# Patient Record
Sex: FEMALE | Race: WHITE | NOT HISPANIC OR LATINO | Employment: OTHER | URBAN - METROPOLITAN AREA
[De-identification: names, ages, dates, MRNs, and addresses within clinical notes are randomized per-mention and may not be internally consistent; named-entity substitution may affect disease eponyms.]

---

## 2017-03-24 RX ORDER — GABAPENTIN 300 MG/1
CAPSULE ORAL
Qty: 90 CAP | Refills: 0 | Status: SHIPPED | OUTPATIENT
Start: 2017-03-24 | End: 2017-07-07 | Stop reason: SDUPTHER

## 2017-03-24 NOTE — TELEPHONE ENCOUNTER
Last seen: 06/13/16 by Dr. Loya  Next appt: None     Was the patient seen in the last year in this department? Yes   Does patient have an active prescription for medications requested? No   Received Request Via: Pharmacy

## 2017-07-07 RX ORDER — AMLODIPINE BESYLATE 5 MG/1
TABLET ORAL
Qty: 30 TAB | Refills: 0 | Status: SHIPPED | OUTPATIENT
Start: 2017-07-07 | End: 2017-08-18 | Stop reason: SDUPTHER

## 2017-08-18 RX ORDER — GABAPENTIN 300 MG/1
CAPSULE ORAL
Qty: 30 CAP | Refills: 0 | Status: SHIPPED | OUTPATIENT
Start: 2017-08-18 | End: 2017-08-21

## 2017-08-18 RX ORDER — AMLODIPINE BESYLATE 5 MG/1
TABLET ORAL
Qty: 30 TAB | Refills: 0 | Status: SHIPPED | OUTPATIENT
Start: 2017-08-18 | End: 2017-08-21

## 2017-08-18 NOTE — TELEPHONE ENCOUNTER
Last seen: 06/13/16 by Dr. Loya  Next appt: 08/21/17 with Dr. Carmona    Was the patient seen in the last year in this department? Yes   Does patient have an active prescription for medications requested? No   Received Request Via: Pharmacy

## 2017-08-21 ENCOUNTER — OFFICE VISIT (OUTPATIENT)
Dept: INTERNAL MEDICINE | Facility: MEDICAL CENTER | Age: 82
End: 2017-08-21
Payer: MEDICARE

## 2017-08-21 VITALS
TEMPERATURE: 98 F | WEIGHT: 129.6 LBS | SYSTOLIC BLOOD PRESSURE: 152 MMHG | RESPIRATION RATE: 18 BRPM | DIASTOLIC BLOOD PRESSURE: 86 MMHG | HEART RATE: 84 BPM | HEIGHT: 59 IN | BODY MASS INDEX: 26.13 KG/M2 | OXYGEN SATURATION: 94 %

## 2017-08-21 DIAGNOSIS — I10 ESSENTIAL HYPERTENSION: ICD-10-CM

## 2017-08-21 DIAGNOSIS — M48.02 SPINAL STENOSIS OF CERVICAL REGION: ICD-10-CM

## 2017-08-21 DIAGNOSIS — Z00.00 EXAMINATION, MEDICAL, GENERAL: ICD-10-CM

## 2017-08-21 DIAGNOSIS — E11.9 TYPE 2 DIABETES MELLITUS WITHOUT COMPLICATION, WITHOUT LONG-TERM CURRENT USE OF INSULIN (HCC): ICD-10-CM

## 2017-08-21 PROCEDURE — 99213 OFFICE O/P EST LOW 20 MIN: CPT | Mod: GE | Performed by: INTERNAL MEDICINE

## 2017-08-21 RX ORDER — GABAPENTIN 100 MG/1
100 CAPSULE ORAL DAILY
Qty: 30 CAP | Refills: 2 | Status: SHIPPED | OUTPATIENT
Start: 2017-08-21 | End: 2017-11-17 | Stop reason: SDUPTHER

## 2017-08-21 RX ORDER — AMLODIPINE BESYLATE 5 MG/1
5 TABLET ORAL DAILY
Qty: 30 TAB | Refills: 2 | Status: SHIPPED | OUTPATIENT
Start: 2017-08-21 | End: 2017-11-17 | Stop reason: SDUPTHER

## 2017-08-21 ASSESSMENT — ENCOUNTER SYMPTOMS
FOCAL WEAKNESS: 0
FEVER: 0
VOMITING: 0
DIARRHEA: 0
NERVOUS/ANXIOUS: 0
NAUSEA: 0
SHORTNESS OF BREATH: 0
WEAKNESS: 0
BLURRED VISION: 0
DEPRESSION: 0
STRIDOR: 0
SENSORY CHANGE: 0
MYALGIAS: 0
EYE PAIN: 0
HEADACHES: 0
ABDOMINAL PAIN: 0
FALLS: 0
CHILLS: 0
TREMORS: 0
CONSTIPATION: 0
BRUISES/BLEEDS EASILY: 0

## 2017-08-21 ASSESSMENT — PAIN SCALES - GENERAL: PAINLEVEL: 4=SLIGHT-MODERATE PAIN

## 2017-08-21 ASSESSMENT — PATIENT HEALTH QUESTIONNAIRE - PHQ9: CLINICAL INTERPRETATION OF PHQ2 SCORE: 0

## 2017-08-21 NOTE — MR AVS SNAPSHOT
"Viki García   2017 10:30 AM   Office Visit   MRN: [id number]    Department:  [facility] - Internal Med   Dept Phone:  [phone]    Description:  Female : 1935   Provider:  Sixto Sheikh M.D.           Reason for Visit     Hypertension refills medication    Labs Only review labs from 2016      Allergies as of 2017     No Known Allergies      You were diagnosed with     Essential hypertension   [7142350]       Spinal stenosis of cervical region   [071245]         Vital Signs     Blood Pressure Pulse Temperature Respirations Height Weight    152/86 mmHg 84 36.7 °C (98 °F) 18 1.486 m (4' 10.5\") 58.786 kg (129 lb 9.6 oz)    Body Mass Index Oxygen Saturation Breastfeeding? Smoking Status          26.62 kg/m2 94% No Never Smoker         Basic Information     Date Of Birth Sex Race Ethnicity Preferred Language    1935 Female White Non- English      Your appointments     Oct 16, 2017  3:00 PM   New Patient with Emerita Obregon M.D.   [facility] - Internal Medicine (--)    [address] [place] [id number] [phone]           Please bring Photo ID, Insurance Cards, All Medication Bottles and copies of any legal documents (such as Living Will, Power of ) If speaking a language besides English please bring an adult . Please arrive 30 minutes prior for check in and registration. You will be receiving a confirmation call a few days before your appointment from our automated call confirmation system.              Problem List              ICD-10-CM Priority Class Noted - Resolved    Spinal stenosis M48.00   2016 - Present    Alcohol abuse F10.10   2016 - Present    Type 2 diabetes mellitus without complication, without long-term current use of insulin (CMS-HCC) E11.9   2016 - Present    Hyperlipidemia, unspecified E78.5   2016 - Present    Osteoarthritis M19.90   2016 - Present    Right shoulder pain " M25.511   6/9/2016 - Present    Essential hypertension I10   6/9/2016 - Present      Health Maintenance        Date Due Completion Dates    DIABETES MONOFILAMENT / LE EXAM 3/24/1936 ---    RETINAL SCREENING 9/24/1953 ---    IMM DTaP/Tdap/Td Vaccine (1 - Tdap) 9/24/1954 ---    PAP SMEAR 9/24/1956 ---    MAMMOGRAM 9/24/1975 ---    COLONOSCOPY 9/24/1985 ---    IMM ZOSTER VACCINE 9/24/1995 ---    BONE DENSITY 9/24/2000 ---    IMM PNEUMOCOCCAL 65+ (ADULT) LOW/MEDIUM RISK SERIES (1 of 2 - PCV13) 9/24/2000 ---    FASTING LIPID PROFILE 2/8/2009 2/8/2008    URINE ACR / MICROALBUMIN 2/8/2009 2/8/2008    A1C SCREENING 12/20/2016 6/20/2016, 2/8/2008    SERUM CREATININE 6/20/2017 6/20/2016, 2/25/2008, 2/24/2008, 2/23/2008, 2/22/2008, 2/22/2008, 2/21/2008, 2/20/2008, 2/19/2008, 2/8/2008, 9/19/2007    IMM INFLUENZA (1) 9/1/2017 ---            Current Immunizations     No immunizations on file.      Below and/or attached are the medications your provider expects you to take. Review all of your home medications and newly ordered medications with your provider and/or pharmacist. Follow medication instructions as directed by your provider and/or pharmacist. Please keep your medication list with you and share with your provider. Update the information when medications are discontinued, doses are changed, or new medications (including over-the-counter products) are added; and carry medication information at all times in the event of emergency situations     Allergies:  No Known Allergies          Medications  Valid as of: August 21, 2017 - 11:31 AM    Generic Name Brand Name Tablet Size Instructions for use    AmLODIPine Besylate (Tab) NORVASC 5 MG Take 1 Tab by mouth every day.        Gabapentin (Cap) NEURONTIN 100 MG Take 1 Cap by mouth every day.        .                 Medicines prescribed today were sent to:     [facility] #[id number] - [place], [place] - [address] [place] [address] AT [address] [address]    Phone:  [phone] Fax: [phone]    Open 24 Hours?: No      Medication refill instructions:       If your prescription bottle indicates you have medication refills left, it is not necessary to call your provider’s office. Please contact your pharmacy and they will refill your medication.    If your prescription bottle indicates you do not have any refills left, you may request refills at any time through one of the following ways: The online Umeng system (except Urgent Care), by calling your provider’s office, or by asking your pharmacy to contact your provider’s office with a refill request. Medication refills are processed only during regular business hours and may not be available until the next business day. Your provider may request additional information or to have a follow-up visit with you prior to refilling your medication.   *Please Note: Medication refills are assigned a new Rx number when refilled electronically. Your pharmacy may indicate that no refills were authorized even though a new prescription for the same medication is available at the pharmacy. Please request the medicine by name with the pharmacy before contacting your provider for a refill.           Umeng Access Code: [email or web address]  Expires: 9/20/2017 10:34 AM    Umeng  A secure, online tool to manage your health information     [employer]’s Umeng® is a secure, online tool that connects you to your personalized health information from the privacy of your home -- day or night - making it very easy for you to manage your healthcare. Once the activation process is completed, you can even access your medical information using the Umeng rashmi, which is available for free in the Apple Rashmi store or Google Play store.     Umeng provides the following levels of access (as shown below):   My Chart Features   [facility] Doctor [facility]  Specialists [facility]  Urgent  Care Non-[facility]  Primary Care  Doctor   Email your healthcare team  securely and privately 24/7 X X X    Manage appointments: schedule your next appointment; view details of past/upcoming appointments X      Request prescription refills. X      View recent personal medical records, including lab and immunizations X X X X   View health record, including health history, allergies, medications X X X X   Read reports about your outpatient visits, procedures, consult and ER notes X X X X   See your discharge summary, which is a recap of your hospital and/or ER visit that includes your diagnosis, lab results, and care plan. X X       How to register for Recommind:  1. Go to  [email or web address].  2. Click on the Sign Up Now box, which takes you to the New Member Sign Up page. You will need to provide the following information:  a. Enter your Recommind Access Code exactly as it appears at the top of this page. (You will not need to use this code after you’ve completed the sign-up process. If you do not sign up before the expiration date, you must request a new code.)   b. Enter your date of birth.   c. Enter your home email address.   d. Click Submit, and follow the next screen’s instructions.  3. Create a Recommind ID. This will be your Recommind login ID and cannot be changed, so think of one that is secure and easy to remember.  4. Create a Recommind password. You can change your password at any time.  5. Enter your Password Reset Question and Answer. This can be used at a later time if you forget your password.   6. Enter your e-mail address. This allows you to receive e-mail notifications when new information is available in Recommind.  7. Click Sign Up. You can now view your health information.    For assistance activating your Recommind account, call [phone]

## 2017-08-22 NOTE — PROGRESS NOTES
Established Patient    CC: medication refill    HPI: Viki García is a 81 y.o. Female, who presents today to refill her medications.  She Has a PMH including DM-2 (not on medications), DLD, improved spinal stenosis (s/p surgery), and HTN.  She states that she is her to refill her amlodipine, and gabapentin.  Those are the only 2 medications she is on (and brought them with her).  She does not know what the gabapentin is for (300 mg, daily), and denies any numbness or tingling, or pain; however, she does have a past surgery for spinal stenosis.  She claims that her home BP measurements are good (but no exact numbers), and that she gets white coat syndrome.  She is accompanied by a friend, who supports those statements (as being accurate).     A review of her living situation notes that she drinks a couple beers a day, and that she has no intention of changing that (at age 81).  A screening review of fall risk notes that she denies recent falls, and has no rugs at home.  There is good lighting and handrails.  Her friend confirms that she is able to get up and around the house without difficulty.  Her friend also notes that the patient dislikes having to go to the doctor's office, and only came, because the pharmacy would not refill her medications.        ROS: As per HPI. Additional pertinent symptoms as noted below.  Review of Systems   Constitutional: Negative for fever and chills.   HENT: Negative for ear discharge.    Eyes: Negative for blurred vision and pain.   Respiratory: Negative for shortness of breath and stridor.    Cardiovascular: Negative for chest pain and leg swelling.   Gastrointestinal: Negative for nausea, vomiting, abdominal pain, diarrhea and constipation.   Genitourinary: Negative for dysuria.   Musculoskeletal: Negative for myalgias and falls.        Notes mild stiffness in neck, where she has a plate placed during surgery for spinal stenosis.    Skin: Negative for rash.   Neurological:  "Negative for tremors, sensory change, focal weakness, weakness and headaches.   Endo/Heme/Allergies: Does not bruise/bleed easily.   Psychiatric/Behavioral: Negative for depression. The patient is not nervous/anxious.         Drinks 2 beers a day, and she considers this to be fine (and not planning to change).      Past Medical History   Diagnosis Date   • Headache 6/9/2016   • Spinal stenosis 6/9/2016   • Alcohol abuse 6/9/2016   • Hyperlipidemia 6/9/2016   • Osteoarthritis 6/9/2016   • Right shoulder pain 6/9/2016   • HTN (hypertension) 6/9/2016   • Diabetes mellitus, type II (CMS-HCC) 6/9/2016     not on medication     Past Surgical History   Procedure Laterality Date   • Other orthopedic surgery       NECK   • Hysterectomy, total abdominal         History reviewed. No pertinent family history.      Social History     Social History   • Marital Status:      Spouse Name: N/A   • Number of Children: N/A   • Years of Education: N/A     Occupational History   • Not on file.     Social History Main Topics   • Smoking status: Never Smoker    • Smokeless tobacco: Never Used   • Alcohol Use: 8.4 oz/week     0 Standard drinks or equivalent, 14 Cans of beer per week   • Drug Use: No   • Sexual Activity: Not on file     Other Topics Concern   • Not on file     Social History Narrative         Medication Sig   • gabapentin (NEURONTIN) 300 MG Cap Take 1 Cap by mouth every day.   • amlodipine (NORVASC) 5 MG Tab Take 1 Tab by mouth every day.     No Known Allergies      Physical Exam  /86 mmHg  Pulse 84  Temp(Src) 36.7 °C (98 °F)  Resp 18  Ht 1.486 m (4' 10.5\")  Wt 58.786 kg (129 lb 9.6 oz)  BMI 26.62 kg/m2  SpO2 94%  Breastfeeding? No    Physical Exam   Constitutional: She is oriented to person, place, and time. She appears well-developed and well-nourished.   HENT:   Head: Normocephalic and atraumatic.   Eyes: Conjunctivae and EOM are normal.   Neck: No tracheal deviation present.   Cardiovascular: Normal " "rate, regular rhythm and normal heart sounds.    Pulmonary/Chest: Effort normal and breath sounds normal. No stridor.   Abdominal: Soft. Bowel sounds are normal. She exhibits no distension. There is no tenderness.   Musculoskeletal: She exhibits no edema or tenderness.   Scar noted on back of neck, from prior surgery.     Neurological: She is alert and oriented to person, place, and time.   Skin: Skin is warm and dry. No rash noted.   Psychiatric: She has a normal mood and affect.       Assessment and Plan    Essential hypertension  Slightly elevated BP at 152/86, in office.    However, her friend verifies she often has \"white coat syndrome,\"  and her measurements at home are (reportedly) normal.   Given her age, and possible white-coat syndrome, will not change medications.   Refilled her amlodipine (5 mg, daily).     Spinal stenosis of cervical region / prior Rx for gabapentin.  Patient does not have any pain, numbness, or tingling.   She denies any current neuropathies.    She does not recall why she was taking gabapentin.   Decreasing gabapentin dose to 100 mg daily (from 300).     DM-2, without complications or long-term current use of insulin    & Examination, medical, general  Last A1C was 6.1, so was not on medication.  Patient should get new annual labs, as last ones are just over a year old.   Will request new cbc, cmp, and a1c.   Given the patient's age, she is to be scheduled with the geriatrician for her next visit (in 1 month).  The patient states she is agreeable to this, but her friend notes she is reluctant to go to a doctor.      Signed by: Sixto Sheikh M.D.      "

## 2017-11-17 DIAGNOSIS — I10 ESSENTIAL HYPERTENSION: ICD-10-CM

## 2017-11-17 RX ORDER — AMLODIPINE BESYLATE 5 MG/1
TABLET ORAL
Qty: 10 TAB | Refills: 0 | Status: SHIPPED | OUTPATIENT
Start: 2017-11-17 | End: 2018-02-26 | Stop reason: SDUPTHER

## 2017-11-17 NOTE — TELEPHONE ENCOUNTER
.Last seen: 08/21/17 by Dr. Loya  Next appt: none    Was the patient seen in the last year in this department? Yes   Does patient have an active prescription for medications requested? No   Received Request Via: Pharmacy

## 2017-12-18 ENCOUNTER — TELEPHONE (OUTPATIENT)
Dept: INTERNAL MEDICINE | Facility: MEDICAL CENTER | Age: 82
End: 2017-12-18

## 2017-12-18 DIAGNOSIS — I10 ESSENTIAL HYPERTENSION: ICD-10-CM

## 2017-12-18 DIAGNOSIS — E11.9 TYPE 2 DIABETES MELLITUS WITHOUT COMPLICATION, WITHOUT LONG-TERM CURRENT USE OF INSULIN (HCC): ICD-10-CM

## 2017-12-18 DIAGNOSIS — M48.02 SPINAL STENOSIS OF CERVICAL REGION: ICD-10-CM

## 2017-12-18 DIAGNOSIS — M19.041 PRIMARY OSTEOARTHRITIS OF BOTH HANDS: ICD-10-CM

## 2017-12-18 DIAGNOSIS — M19.042 PRIMARY OSTEOARTHRITIS OF BOTH HANDS: ICD-10-CM

## 2017-12-18 NOTE — TELEPHONE ENCOUNTER
Spoke with patient to answered all questions appropriately. She states her dilemma is that she has no transportation and can't afford bus services etc. So she is essentially homebound and cannot get herself to care.  We will try putting a home health referral in and see if they can get her some  support.

## 2017-12-18 NOTE — TELEPHONE ENCOUNTER
SOPHIA~ Pharmacy says pt has had labs drawn and doesn't understand why her Amlodipine was denied. We have no results so I contacted pt and she is very confused. Says she doesn't know where labs were drawn, will call the person that drove her and find out. She claims all her friends have , she has no one and she'll die soon too so it doesn't matter if she gets her meds. She doesn't drive and refuses to use [employer] bus or taxi to come to appts so says she won't schedule. I explained that we have to see her, especially now since the last 2 times I've talked to her I'm worried about her living on her own, she seems confused, agitated and down on the phone.  I repeated myself many times during our conversation today,and again encouraged her to see a geriatrician.

## 2017-12-19 ENCOUNTER — TELEPHONE (OUTPATIENT)
Dept: INTERNAL MEDICINE | Facility: MEDICAL CENTER | Age: 82
End: 2017-12-19

## 2017-12-19 NOTE — TELEPHONE ENCOUNTER
Home health since they cannot see this patient until she has a face-to-face in the office. Please ask her to come in for a visit so we can get home health going

## 2017-12-19 NOTE — TELEPHONE ENCOUNTER
Spoke to pt, she doesn't remember our phone conversation yesterday very well, says she can't contact anybody to see where she had labs drawn. I've asked Luz Elena at [facility] to draw fasting labs, she says they can do that next week.

## 2018-01-19 ENCOUNTER — TELEPHONE (OUTPATIENT)
Dept: INTERNAL MEDICINE | Facility: MEDICAL CENTER | Age: 83
End: 2018-01-19

## 2018-01-19 NOTE — TELEPHONE ENCOUNTER
Pt called and wanted to know why her medication was not filled. Notified pt that she has to get her labs done in order to get her medication refilled. Notified her that she hasn't had her labs done since 2016. Pt was not understanding what I meant and kept repeating herself as to why her medication is not refilled. I am concerned that pt should not being living alone as she is not able to care for herself. Let her know that home health has been trying to reach her per their notes and pt did not answer. Pt states that she will just die since she cannot get her medication. Notified Dr. Hoskins medical assistant of the conversation and she is going to contact pt and see what we are going to do moving forward in order to get pt the best care we can.

## 2018-01-23 NOTE — TELEPHONE ENCOUNTER
Please call patient and reinforce that it is important she get laboratory testing done. And then she should follow up here to be seen.    Also let her know that  untreated hypertension increases the risk of heart attack, stroke, kidney disease, as well as other health issues.    Patient also has prediabetes which needs follow-up.     Taking blood pressure medication without regular visits to monitor blood pressure, and without labs to see if we are causing more harm than good is dangerous as well.    If she continues to be unwilling to do lab work and to be seen, she should at a minimum work on a low salt diet, regular exercise, and avoiding alcohol to decrease the risk of hypertension related illness.

## 2018-01-24 NOTE — TELEPHONE ENCOUNTER
I copied this to a letter and mailed to pt with lab order. She has a hard time understanding what we are telling her on the phone. I will f/u with her next week.

## 2018-01-31 NOTE — TELEPHONE ENCOUNTER
"I offered Uber rides and Geriatricians that see pt's at their homes. Pt to think about, says she just wants to give up at this point. We've discussed suicide, she says she has no intentions, just very frustrated that she has nobody and no transportation. Pt tearful most times I talk with her. She'd like to think about my offers for a couple days. [facility] is the only one that I can find that does home visits, but per Miriam, they are \"on hold\" x 1 month. Phone [phone].  "

## 2018-02-14 ENCOUNTER — TELEPHONE (OUTPATIENT)
Dept: INTERNAL MEDICINE | Facility: MEDICAL CENTER | Age: 83
End: 2018-02-14

## 2018-02-14 NOTE — TELEPHONE ENCOUNTER
Pt has been c/o not getting her meds and wanting to just die x months now. I spent the last week convincing her to let an Uber  ([facility] [employer]) bring her here, to the lab and back home so that we can see her. Home health &  won't see her since it's been so long since she's had a face-to-face with us. She has no friends, no family, no $, refuses taxi or bus. I had her scheduled here this am and called her many times to reiterate that the Uber  would be at her house @ 10:15 this am, he came and she refused ride. She called here tearful, wondering what she's going to do now. Says again she just thinks she should kill herself.  I have her on a waiting list for Geriatric in home visits, but they can't see her x months. She is to call me in the next 2 days if she'll agree again to have Uber pick her up.

## 2018-02-14 NOTE — TELEPHONE ENCOUNTER
Left [facility] with [facility] [phone]- gubxij call back to [phone].

## 2018-02-14 NOTE — TELEPHONE ENCOUNTER
"Spoke with intake person at [facility]- told that I have concerns: pt expressed to MA \"would like to just die\"- (although no mention of being actively suicidal), that pt unable to get to office for visit- we sent UBER- and she sent it away today, not taking meds or getting labs, unable to get SW or HH to home since no recent office visit, pt with hx EtOH use disorder.   A report will be sent to supervisor.  "

## 2018-02-16 NOTE — TELEPHONE ENCOUNTER
Sanna with [facility] returned my call @ 2:44pm today, says she can't really give us too much information. She is at pt's home now and we have arranged for pt to once again be p/u by an Uber  on 02/26/18 to see  at 10:30am. Sanna is to be at pt's home to facilitate her ride. She reiterated to pt that she was unable to get her meds refilled until seen and I listened as she gave all the information to Viki re: appt, Uber, her coming back out.     Then pt called at 3:35pm and asked for her meds to be refilled, acted like she knew nothing about her appt, Uber and Sanna coming back out, didn't remember anything from their conversation 1 hour prior! I left Sanna a message in regards to this as this happens with pt often.

## 2018-02-26 ENCOUNTER — HOME HEALTH ADMISSION (OUTPATIENT)
Dept: HOME HEALTH SERVICES | Facility: HOME HEALTHCARE | Age: 83
End: 2018-02-26
Payer: MEDICARE

## 2018-02-26 ENCOUNTER — OFFICE VISIT (OUTPATIENT)
Dept: INTERNAL MEDICINE | Facility: MEDICAL CENTER | Age: 83
End: 2018-02-26
Payer: MEDICARE

## 2018-02-26 VITALS
OXYGEN SATURATION: 93 % | SYSTOLIC BLOOD PRESSURE: 191 MMHG | DIASTOLIC BLOOD PRESSURE: 95 MMHG | BODY MASS INDEX: 26.21 KG/M2 | HEIGHT: 59 IN | TEMPERATURE: 98.5 F | HEART RATE: 95 BPM | WEIGHT: 130 LBS

## 2018-02-26 DIAGNOSIS — I10 ESSENTIAL HYPERTENSION: ICD-10-CM

## 2018-02-26 DIAGNOSIS — Z78.9 ALCOHOL USE: ICD-10-CM

## 2018-02-26 DIAGNOSIS — R73.03 PREDIABETES: ICD-10-CM

## 2018-02-26 DIAGNOSIS — Z00.00 HEALTH CARE MAINTENANCE: ICD-10-CM

## 2018-02-26 DIAGNOSIS — M48.02 SPINAL STENOSIS OF CERVICAL REGION: ICD-10-CM

## 2018-02-26 LAB
HBA1C MFR BLD: 5.6 % (ref ?–5.8)
INT CON NEG: NEGATIVE
INT CON POS: POSITIVE

## 2018-02-26 PROCEDURE — 99214 OFFICE O/P EST MOD 30 MIN: CPT | Mod: GC | Performed by: INTERNAL MEDICINE

## 2018-02-26 PROCEDURE — 83036 HEMOGLOBIN GLYCOSYLATED A1C: CPT | Performed by: INTERNAL MEDICINE

## 2018-02-26 PROCEDURE — 92250 FUNDUS PHOTOGRAPHY W/I&R: CPT | Mod: TC,GC | Performed by: INTERNAL MEDICINE

## 2018-02-26 RX ORDER — GABAPENTIN 100 MG/1
100 CAPSULE ORAL DAILY
Qty: 30 CAP | Refills: 0 | Status: SHIPPED | OUTPATIENT
Start: 2018-02-26 | End: 2018-03-28 | Stop reason: SDUPTHER

## 2018-02-26 RX ORDER — AMLODIPINE BESYLATE 5 MG/1
TABLET ORAL
Qty: 30 TAB | Refills: 0 | Status: SHIPPED | OUTPATIENT
Start: 2018-02-26 | End: 2018-02-26

## 2018-02-26 RX ORDER — AMLODIPINE BESYLATE 10 MG/1
10 TABLET ORAL DAILY
Qty: 30 TAB | Refills: 0 | Status: SHIPPED | OUTPATIENT
Start: 2018-02-26 | End: 2018-04-06

## 2018-02-26 ASSESSMENT — PATIENT HEALTH QUESTIONNAIRE - PHQ9
5. POOR APPETITE OR OVEREATING: 0 - NOT AT ALL
SUM OF ALL RESPONSES TO PHQ QUESTIONS 1-9: 3
CLINICAL INTERPRETATION OF PHQ2 SCORE: 3

## 2018-02-26 ASSESSMENT — ACTIVITIES OF DAILY LIVING (ADL): BATHING_REQUIRES_ASSISTANCE: 0

## 2018-02-26 NOTE — PROGRESS NOTES
Established Patient    Viki presents today with the following:    CC: Medication refill and follow-up of chronic conditions    HPI: 82-year-old female patient with a past medical history of hypertension, osteoarthritis, alcohol abuse, spinal stenosis comes in for medication refill and follow-up of her chronic conditions.    #1 hypertension: Patient states chronic history and currently on amlodipine 5 mg PO once daily. Denies any related symptoms headache, blurry vision, chest pain, palpitations, problems with urination. Patient states she is compliant with medications.Measures BP at home infrequently , does not remember the readings.    #2 Spinal stenosis: Chronic history .Undergone surgeries in the past. Currently stable with gabapentin 100 mg by mouth once daily. The dose was reduced last year from 300 mg to 100 mg. Denies any related symptoms.    #3. Diabetes: Long history in last HbA1c was 6.1 in June 2016. Currently denies any related symptoms of polyuria, polydipsia, tingling, numbness, problems with urination, chest pain, palpitations. Does not recall if she had any eye examination done in the past. Currently not on any medications.    #4 alcohol use: Patient states she has been drinking since the age of 8. 2-3 drinks of beer daily which hasn't changed in the past many years. Not motivated to cut down on alcohol intake yet.    Independent regarding ADLs and IADLs  Patient  lives alone and lives in a [address] house.     Patient Active Problem List    Diagnosis Date Noted   • Spinal stenosis 06/09/2016   • Alcohol abuse 06/09/2016   • Type 2 diabetes mellitus without complication, without long-term current use of insulin (CMS-[facility]) 06/09/2016   • Hyperlipidemia, unspecified 06/09/2016   • Osteoarthritis 06/09/2016   • Right shoulder pain 06/09/2016   • Essential hypertension 06/09/2016       Current Outpatient Prescriptions   Medication Sig Dispense Refill   • gabapentin (NEURONTIN) 100 MG Cap Take 1 Cap by  "mouth every day. 30 Cap 0   • amLODIPine (NORVASC) 10 MG Tab Take 1 Tab by mouth every day. 30 Tab 0     No current facility-administered medications for this visit.        Social History     Social History   • Marital status:      Spouse name: N/A   • Number of children: N/A   • Years of education: N/A     Occupational History   • Not on file.     Social History Main Topics   • Smoking status: Never Smoker   • Smokeless tobacco: Never Used   • Alcohol use 8.4 oz/week     14 Cans of beer per week   • Drug use: No   • Sexual activity: Not on file     Other Topics Concern   • Not on file     Social History Narrative   • No narrative on file       History reviewed. No pertinent family history.    ROS: As per HPI. Additional pertinent symptoms as noted below.    Constitutional: Denies fever/chills/weight changes.   Eyes: Denies changes/pain in vision  ENT: Denies sore throat/congestion/ear ache.   Cardiovascular: Denies chest pain /palpitations/edema.   Respiratory: Denies SOB/cough/PND/orthopnea  Abdomen: Denies difficulty swallowing/ diarrhea/constipation/abdominal pain/nausea/vomiting  Genitourinary: Normal urinary habits.   Musculo-skeletal: normal ambulation.Denies muscle or joint pains.  Skin: Denies rash/lesions.  Neurological: Denies weakness/tingling/numbness/headache  Psychological: good mood and cooperative. Positive for anxiety       BP (!) 191/95   Pulse 95   Temp 36.9 °C (98.5 °F)   Ht 1.492 m (4' 10.74\")   Wt 59 kg (130 lb)   SpO2 93%   BMI 26.49 kg/m²     Physical Exam  General:  Alert and oriented, No apparent distress.    Eyes: Pupils equal and reactive. No scleral icterus.    Throat: Clear no erythema or exudates noted.    Neck: Supple. No lymphadenopathy noted. Thyroid not enlarged.    Lungs: Clear to auscultation and percussion bilaterally.    Cardiovascular: Regular rate and rhythm. No murmurs, rubs or gallops.    Abdomen:  Benign. No rebound or guarding noted.    Extremities: No " clubbing, cyanosis, edema.    Skin: Clear. No rash or suspicious skin lesions noted.  Neurological: Oriented to time, place, and person .Cranial nerves intact. No motor/sensory deficits.Reflexes were normal and symmetrical in both upper and lower extremities     Musculoskeletal : NROM of all extremities. No tenderness or deformity noted.     Note: I have reviewed all pertinent labs and diagnostic tests associated with this visit with specific comments listed under the assessment and plan below    Assessment and Plan    1. Essential hypertension  - today blood pressure is 191/95  -Currently the patient is on amlodipine 5 mg by mouth once daily  -Denies any related symptoms  -Repeat blood pressure is also high 190s/90s  -Increased amlodipine to 10 mg by mouth once daily and ordered CMP, microalbumin creatinine ratio for the next visit  -Will follow up in 4 weeks    2. Prediabetes  -Patient currently is not on any medication  -Last HbA1c 6.1 in June 2016  -Denies any related symptoms  -Ordered POCT HbA1c in the clinic and result is 5.6 which is a nondiabetic range    3. Alcohol use  -Patient currently drinks 3 cans of beer every day  -Denies any related symptoms  -Ordered CBC, CMP, lipid panel  -Counseled the patient regarding hazards of alcohol use and related complications and patient did noted to be aware of.  -Will follow-up in next visit in 4 weeks    4. Spinal stenosis of cervical region  -Chronic history and currently stable  -Patient is currently on gabapentin 100 mg by mouth once daily and refill the medication    5. Health care maintenance  -Patient states she doesn't want to take any vaccinations  -Does not remember when she had mammogram, Pap, colonoscopy, DEXA  -Patient is independent regarding ADLs and IADLs  -Noted to have baseline memory issues  -Does not smoke but drinks alcohol around 3 cans of beer daily and denies illicit drug use  -Patient lives by herself and feels lonely -may have baseline mild  depression with anxiety component. Denies suicidal ideation/attempt.  -Labs were ordered in the past which are still pending patient states this is because she cannot drive-ordered referral to home health to draw the labs, to monitor blood pressure, to help to set up with .  -Might consider referral to geriatrics at [facility] for geriatric population-future    Follow-up: Return in 4 weeks or earlier if any acute issues    Signed by: Chang Soares M.D.

## 2018-02-26 NOTE — LETTER
February 26, 2018         Viki García  [address] [place] [address]        Dear Viki:      Below are the results from your recent visit:    Resulted Orders   POCT A1C   Result Value Ref Range    Glycohemoglobin 5.6 %      Comment:      lot# [id number] exp 09/2019    Internal Control Negative Negative     Internal Control Positive Positive      The test results show normal .    If you have any questions or concerns, please don't hesitate to call.        Sincerely,      Chang Soares M.D.    Electronically Signed

## 2018-02-26 NOTE — PATIENT INSTRUCTIONS
Diabetes and Standards of Medical Care  Diabetes is complicated. You may find that your diabetes team includes a dietitian, nurse, diabetes educator, eye doctor, and more. To help everyone know what is going on and to help you get the care you deserve, the following schedule of care was developed to help keep you on track. Below are the tests, exams, vaccines, medicines, education, and plans you will need.  HbA1c test  This test shows how well you have controlled your glucose over the past 2-3 months. It is used to see if your diabetes management plan needs to be adjusted.   · It is performed at least 2 times a year if you are meeting treatment goals.  · It is performed 4 times a year if therapy has changed or if you are not meeting treatment goals.  Blood pressure test  · This test is performed at every routine medical visit. The goal is less than 140/90 mm Hg for most people, but 130/80 mm Hg in some cases. Ask your health care provider about your goal.  Dental exam  · Follow up with the dentist regularly.  Eye exam  · If you are diagnosed with type 1 diabetes as a child, get an exam upon reaching the age of 10 years or older and having had diabetes for 3-5 years. Yearly eye exams are recommended after that initial eye exam.  · If you are diagnosed with type 1 diabetes as an adult, get an exam within 5 years of diagnosis and then yearly.  · If you are diagnosed with type 2 diabetes, get an exam as soon as possible after the diagnosis and then yearly.  Foot care exam  · Visual foot exams are performed at every routine medical visit. The exams check for cuts, injuries, or other problems with the feet.  · You should have a complete foot exam performed every year. This exam includes an inspection of the structure and skin of your feet, a check of the pulses in your feet, and a check of the sensation in your feet.  ¨ Type 1 diabetes: The first exam is performed 5 years after diagnosis.  ¨ Type 2 diabetes: The first  exam is performed at the time of diagnosis.  · Check your feet nightly for cuts, injuries, or other problems with your feet. Tell your health care provider if anything is not healing.  Kidney function test (urine microalbumin)  · This test is performed once a year.  ¨ Type 1 diabetes: The first test is performed 5 years after diagnosis.  ¨ Type 2 diabetes: The first test is performed at the time of diagnosis.  · A serum creatinine and estimated glomerular filtration rate (eGFR) test is done once a year to assess the level of chronic kidney disease (CKD), if present.  Lipid profile (cholesterol, HDL, LDL, triglycerides)  · Performed every 5 years for most people.  ¨ The goal for LDL is less than 100 mg/dL. If you are at high risk, the goal is less than 70 mg/dL.  ¨ The goal for HDL is 40 mg/dL-50 mg/dL for men and 50 mg/dL-60 mg/dL for women. An HDL cholesterol of 60 mg/dL or higher gives some protection against heart disease.  ¨ The goal for triglycerides is less than 150 mg/dL.  Immunizations  · The flu (influenza) vaccine is recommended yearly for every person 6 months of age or older who has diabetes.  · The pneumonia (pneumococcal) vaccine is recommended for every person 2 years of age or older who has diabetes. Adults 65 years of age or older may receive the pneumonia vaccine as a series of two separate shots.  · The hepatitis B vaccine is recommended for adults shortly after they have been diagnosed with diabetes.  · The Tdap (tetanus, diphtheria, and pertussis) vaccine should be given:  ¨ According to normal childhood vaccination schedules, for children.  ¨ Every 10 years, for adults who have diabetes.  Diabetes self-management education  · Education is recommended at diagnosis and ongoing as needed.  Treatment plan  · Your treatment plan is reviewed at every medical visit.     This information is not intended to replace advice given to you by your health care provider. Make sure you discuss any questions you  have with your health care provider.     Document Released: 10/15/2010 Document Revised: 01/08/2016 Document Reviewed: 05/20/2014  [employer] Interactive Patient Education ©2016 [employer]  Hypertension  Hypertension, commonly called high blood pressure, is when the force of blood pumping through your arteries is too strong. Your arteries are the blood vessels that carry blood from your heart throughout your body. A blood pressure reading consists of a higher number over a lower number, such as 110/72. The higher number (systolic) is the pressure inside your arteries when your heart pumps. The lower number (diastolic) is the pressure inside your arteries when your heart relaxes. Ideally you want your blood pressure below 120/80.  Hypertension forces your heart to work harder to pump blood. Your arteries may become narrow or stiff. Having untreated or uncontrolled hypertension can cause heart attack, stroke, kidney disease, and other problems.  RISK FACTORS  Some risk factors for high blood pressure are controllable. Others are not.   Risk factors you cannot control include:   · Race. You may be at higher risk if you are .  · Age. Risk increases with age.  · Gender. Men are at higher risk than women before age 45 years. After age 65, women are at higher risk than men.  Risk factors you can control include:  · Not getting enough exercise or physical activity.  · Being overweight.  · Getting too much fat, sugar, calories, or salt in your diet.  · Drinking too much alcohol.  SIGNS AND SYMPTOMS  Hypertension does not usually cause signs or symptoms. Extremely high blood pressure (hypertensive crisis) may cause headache, anxiety, shortness of breath, and nosebleed.  DIAGNOSIS  To check if you have hypertension, your health care provider will measure your blood pressure while you are seated, with your arm held at the level of your heart. It should be measured at least twice using the same arm. Certain  conditions can cause a difference in blood pressure between your right and left arms. A blood pressure reading that is higher than normal on one occasion does not mean that you need treatment. If it is not clear whether you have high blood pressure, you may be asked to return on a different day to have your blood pressure checked again. Or, you may be asked to monitor your blood pressure at home for 1 or more weeks.  TREATMENT  Treating high blood pressure includes making lifestyle changes and possibly taking medicine. Living a healthy lifestyle can help lower high blood pressure. You may need to change some of your habits.  Lifestyle changes may include:  · Following the DASH diet. This diet is high in fruits, vegetables, and whole grains. It is low in salt, red meat, and added sugars.  · Keep your sodium intake below 2,300 mg per day.  · Getting at least 30-45 minutes of aerobic exercise at least 4 times per week.  · Losing weight if necessary.  · Not smoking.  · Limiting alcoholic beverages.  · Learning ways to reduce stress.  Your health care provider may prescribe medicine if lifestyle changes are not enough to get your blood pressure under control, and if one of the following is true:  · You are 18-59 years of age and your systolic blood pressure is above 140.  · You are 60 years of age or older, and your systolic blood pressure is above 150.  · Your diastolic blood pressure is above 90.  · You have diabetes, and your systolic blood pressure is over 140 or your diastolic blood pressure is over 90.  · You have kidney disease and your blood pressure is above 140/90.  · You have heart disease and your blood pressure is above 140/90.  Your personal target blood pressure may vary depending on your medical conditions, your age, and other factors.  HOME CARE INSTRUCTIONS  · Have your blood pressure rechecked as directed by your health care provider.    · Take medicines only as directed by your health care provider.  Follow the directions carefully. Blood pressure medicines must be taken as prescribed. The medicine does not work as well when you skip doses. Skipping doses also puts you at risk for problems.  · Do not smoke.    · Monitor your blood pressure at home as directed by your health care provider.   SEEK MEDICAL CARE IF:   · You think you are having a reaction to medicines taken.  · You have recurrent headaches or feel dizzy.  · You have swelling in your ankles.  · You have trouble with your vision.  SEEK IMMEDIATE MEDICAL CARE IF:  · You develop a severe headache or confusion.  · You have unusual weakness, numbness, or feel faint.  · You have severe chest or abdominal pain.  · You vomit repeatedly.  · You have trouble breathing.  MAKE SURE YOU:   · Understand these instructions.  · Will watch your condition.  · Will get help right away if you are not doing well or get worse.     This information is not intended to replace advice given to you by your health care provider. Make sure you discuss any questions you have with your health care provider.     Document Released: 12/18/2006 Document Revised: 05/03/2016 Document Reviewed: 10/10/2014  [employer] Interactive Patient Education ©2016 [employer]

## 2018-02-28 LAB — RETINAL SCREEN: NEGATIVE

## 2018-03-03 ENCOUNTER — HOME CARE VISIT (OUTPATIENT)
Dept: HOME HEALTH SERVICES | Facility: HOME HEALTHCARE | Age: 83
End: 2018-03-03

## 2018-03-05 ENCOUNTER — TELEPHONE (OUTPATIENT)
Dept: INTERNAL MEDICINE | Facility: MEDICAL CENTER | Age: 83
End: 2018-03-05

## 2018-03-05 ENCOUNTER — HOME CARE VISIT (OUTPATIENT)
Dept: HOME HEALTH SERVICES | Facility: HOME HEALTHCARE | Age: 83
End: 2018-03-05

## 2018-03-05 NOTE — TELEPHONE ENCOUNTER
----- Message from Haley Kennedy R.N. sent at 3/3/2018  1:23 PM PST -----  Went to patient's home to do scheduled SOC and she states she is going to get her taxes done she forgot. would like to schedule for next week .

## 2018-03-05 NOTE — TELEPHONE ENCOUNTER
Pt has been very resistant to intervention, after many missed appointments she was recently seen (we provided transportation). EPS has been involved as well. Pt lives in her own home, but says she needs to make a change (? To assisted living)- but does not know how to proceed (no transportation, can't find a place to move to, or how to arrange any of that). She has no family.     It seems  is what she needs most, (as well as checking BPs and if she is taking her meds, etc.)  So please do try again.     Thank you

## 2018-03-19 ENCOUNTER — TELEPHONE (OUTPATIENT)
Dept: INTERNAL MEDICINE | Facility: MEDICAL CENTER | Age: 83
End: 2018-03-19

## 2018-03-19 NOTE — TELEPHONE ENCOUNTER
Reviewed Home care notes and [facility]

## 2018-06-06 DIAGNOSIS — M48.02 SPINAL STENOSIS OF CERVICAL REGION: ICD-10-CM

## 2018-06-06 RX ORDER — GABAPENTIN 100 MG/1
CAPSULE ORAL
Qty: 30 CAP | Refills: 0 | Status: SHIPPED | OUTPATIENT
Start: 2018-06-06 | End: 2018-07-04 | Stop reason: SDUPTHER

## 2018-06-06 NOTE — TELEPHONE ENCOUNTER
Last seen: 02/26/18 by Dr. Soares  Next appt: None    Was the patient seen in the last year in this department? Yes   Does patient have an active prescription for medications requested? No   Received Request Via: Pharmacy

## 2019-04-21 DIAGNOSIS — I10 ESSENTIAL HYPERTENSION: ICD-10-CM

## 2019-04-22 RX ORDER — AMLODIPINE BESYLATE 10 MG/1
TABLET ORAL
Refills: 1 | OUTPATIENT
Start: 2019-04-22

## 2019-04-26 DIAGNOSIS — M48.02 SPINAL STENOSIS OF CERVICAL REGION: ICD-10-CM

## 2019-04-26 DIAGNOSIS — I10 ESSENTIAL HYPERTENSION: ICD-10-CM

## 2019-04-26 RX ORDER — AMLODIPINE BESYLATE 10 MG/1
TABLET ORAL
Refills: 1 | OUTPATIENT
Start: 2019-04-26

## 2019-04-26 RX ORDER — GABAPENTIN 100 MG/1
CAPSULE ORAL
Refills: 1 | OUTPATIENT
Start: 2019-04-26

## 2019-05-03 DIAGNOSIS — M48.02 SPINAL STENOSIS OF CERVICAL REGION: ICD-10-CM

## 2019-05-03 DIAGNOSIS — I10 ESSENTIAL HYPERTENSION: ICD-10-CM

## 2019-05-03 RX ORDER — AMLODIPINE BESYLATE 10 MG/1
TABLET ORAL
Refills: 1 | OUTPATIENT
Start: 2019-05-03

## 2019-05-03 RX ORDER — GABAPENTIN 100 MG/1
CAPSULE ORAL
Refills: 1 | OUTPATIENT
Start: 2019-05-03

## 2021-01-14 DIAGNOSIS — Z23 NEED FOR VACCINATION: ICD-10-CM

## 2021-11-12 ENCOUNTER — APPOINTMENT (OUTPATIENT)
Dept: RADIOLOGY | Facility: MEDICAL CENTER | Age: 86
DRG: 057 | End: 2021-11-12
Attending: EMERGENCY MEDICINE
Payer: MEDICARE

## 2021-11-12 ENCOUNTER — HOSPITAL ENCOUNTER (INPATIENT)
Facility: MEDICAL CENTER | Age: 86
LOS: 17 days | DRG: 057 | End: 2021-11-30
Attending: EMERGENCY MEDICINE | Admitting: HOSPITALIST
Payer: MEDICARE

## 2021-11-12 DIAGNOSIS — E86.0 DEHYDRATION: ICD-10-CM

## 2021-11-12 DIAGNOSIS — R33.9 URINARY RETENTION: ICD-10-CM

## 2021-11-12 DIAGNOSIS — E87.6 HYPOKALEMIA: ICD-10-CM

## 2021-11-12 DIAGNOSIS — F10.931 ALCOHOL WITHDRAWAL SYNDROME, WITH DELIRIUM (HCC): ICD-10-CM

## 2021-11-12 DIAGNOSIS — F03.91 DEMENTIA WITH BEHAVIORAL DISTURBANCE, UNSPECIFIED DEMENTIA TYPE: ICD-10-CM

## 2021-11-12 DIAGNOSIS — R53.1 WEAKNESS: ICD-10-CM

## 2021-11-12 DIAGNOSIS — I48.91 ATRIAL FIBRILLATION WITH RVR (HCC): ICD-10-CM

## 2021-11-12 DIAGNOSIS — E87.0 HYPERNATREMIA: ICD-10-CM

## 2021-11-12 DIAGNOSIS — F01.50 VASCULAR DEMENTIA WITHOUT BEHAVIORAL DISTURBANCE (HCC): ICD-10-CM

## 2021-11-12 LAB
ALBUMIN SERPL BCP-MCNC: 4.2 G/DL (ref 3.2–4.9)
ALBUMIN/GLOB SERPL: 1.2 G/DL
ALP SERPL-CCNC: 116 U/L (ref 30–99)
ALT SERPL-CCNC: 47 U/L (ref 2–50)
ANION GAP SERPL CALC-SCNC: 12 MMOL/L (ref 7–16)
APPEARANCE UR: CLEAR
AST SERPL-CCNC: 54 U/L (ref 12–45)
BASOPHILS # BLD AUTO: 0.1 % (ref 0–1.8)
BASOPHILS # BLD: 0.01 K/UL (ref 0–0.12)
BILIRUB SERPL-MCNC: 0.9 MG/DL (ref 0.1–1.5)
BILIRUB UR QL STRIP.AUTO: NEGATIVE
BLOOD CULTURE HOLD CXBCH: NORMAL
BUN SERPL-MCNC: 71 MG/DL (ref 8–22)
CALCIUM SERPL-MCNC: 9.8 MG/DL (ref 8.5–10.5)
CHLORIDE SERPL-SCNC: 107 MMOL/L (ref 96–112)
CO2 SERPL-SCNC: 23 MMOL/L (ref 20–33)
COLOR UR: YELLOW
CREAT SERPL-MCNC: 0.94 MG/DL (ref 0.5–1.4)
EOSINOPHIL # BLD AUTO: 0 K/UL (ref 0–0.51)
EOSINOPHIL NFR BLD: 0 % (ref 0–6.9)
ERYTHROCYTE [DISTWIDTH] IN BLOOD BY AUTOMATED COUNT: 47.7 FL (ref 35.9–50)
GLOBULIN SER CALC-MCNC: 3.5 G/DL (ref 1.9–3.5)
GLUCOSE SERPL-MCNC: 124 MG/DL (ref 65–99)
GLUCOSE UR STRIP.AUTO-MCNC: NEGATIVE MG/DL
HCT VFR BLD AUTO: 46.5 % (ref 37–47)
HGB BLD-MCNC: 15.6 G/DL (ref 12–16)
IMM GRANULOCYTES # BLD AUTO: 0.1 K/UL (ref 0–0.11)
IMM GRANULOCYTES NFR BLD AUTO: 0.6 % (ref 0–0.9)
KETONES UR STRIP.AUTO-MCNC: ABNORMAL MG/DL
LEUKOCYTE ESTERASE UR QL STRIP.AUTO: NEGATIVE
LYMPHOCYTES # BLD AUTO: 1.6 K/UL (ref 1–4.8)
LYMPHOCYTES NFR BLD: 9.6 % (ref 22–41)
MCH RBC QN AUTO: 32.6 PG (ref 27–33)
MCHC RBC AUTO-ENTMCNC: 33.5 G/DL (ref 33.6–35)
MCV RBC AUTO: 97.1 FL (ref 81.4–97.8)
MICRO URNS: ABNORMAL
MONOCYTES # BLD AUTO: 0.9 K/UL (ref 0–0.85)
MONOCYTES NFR BLD AUTO: 5.4 % (ref 0–13.4)
NEUTROPHILS # BLD AUTO: 14.06 K/UL (ref 2–7.15)
NEUTROPHILS NFR BLD: 84.3 % (ref 44–72)
NITRITE UR QL STRIP.AUTO: NEGATIVE
NRBC # BLD AUTO: 0 K/UL
NRBC BLD-RTO: 0 /100 WBC
PH UR STRIP.AUTO: 5 [PH] (ref 5–8)
PLATELET # BLD AUTO: 317 K/UL (ref 164–446)
PMV BLD AUTO: 9.9 FL (ref 9–12.9)
POTASSIUM SERPL-SCNC: 4 MMOL/L (ref 3.6–5.5)
PROT SERPL-MCNC: 7.7 G/DL (ref 6–8.2)
PROT UR QL STRIP: NEGATIVE MG/DL
RBC # BLD AUTO: 4.79 M/UL (ref 4.2–5.4)
RBC UR QL AUTO: NEGATIVE
SODIUM SERPL-SCNC: 142 MMOL/L (ref 135–145)
SP GR UR STRIP.AUTO: 1.02
UROBILINOGEN UR STRIP.AUTO-MCNC: 0.2 MG/DL
WBC # BLD AUTO: 16.7 K/UL (ref 4.8–10.8)

## 2021-11-12 PROCEDURE — 99285 EMERGENCY DEPT VISIT HI MDM: CPT

## 2021-11-12 PROCEDURE — 85025 COMPLETE CBC W/AUTO DIFF WBC: CPT

## 2021-11-12 PROCEDURE — 81003 URINALYSIS AUTO W/O SCOPE: CPT

## 2021-11-12 PROCEDURE — 70450 CT HEAD/BRAIN W/O DYE: CPT | Mod: ME

## 2021-11-12 PROCEDURE — 700105 HCHG RX REV CODE 258: Performed by: EMERGENCY MEDICINE

## 2021-11-12 PROCEDURE — 80053 COMPREHEN METABOLIC PANEL: CPT

## 2021-11-12 RX ORDER — SODIUM CHLORIDE 9 MG/ML
1000 INJECTION, SOLUTION INTRAVENOUS ONCE
Status: COMPLETED | OUTPATIENT
Start: 2021-11-12 | End: 2021-11-12

## 2021-11-12 RX ADMIN — SODIUM CHLORIDE 1000 ML: 9 INJECTION, SOLUTION INTRAVENOUS at 15:00

## 2021-11-12 NOTE — ED TRIAGE NOTES
"Chief Complaint   Patient presents with   • Altered Mental Status     pts last seen by her neighbors at her baseline at 1100 yesterday. neighbors went to check on pt and she was on the ground unable to get up. per neighbors pt is more confused, per neighbors pt has undiagnosed dementia       Pt BIB EMS from home. Pts neighbors have DPOA over pt. Pt has bruising noted to L knee. Pt denies any pain. Pt aox1 to self.       BP (!) 171/98   Pulse 90   Temp 36 °C (96.8 °F) (Temporal)   Resp 16   Ht 1.6 m (5' 3\")   Wt 54.4 kg (120 lb)   SpO2 95%   BMI 21.26 kg/m²     "

## 2021-11-12 NOTE — ED NOTES
Med rec completed per pt's neighbor   Allergies reviewed  No PO antibiotics in the last 30 days     Neighbor states that pt does not take any daily medications    ADRIA/MASON/Bianca

## 2021-11-12 NOTE — ED PROVIDER NOTES
ED Provider Note    Scribed for Artur Dc M.D. by Sabrina Rosenthal. 11/12/2021  1:18 PM    Primary care provider: Chang Soares M.D.  Means of arrival: EMS  History obtained from: EMS, Patient, neighbor (POA)  History limited by: Altered mental status     CHIEF COMPLAINT  Chief Complaint   Patient presents with   • Altered Mental Status     pts last seen by her neighbors at her baseline at 1100 yesterday. neighbors went to check on pt and she was on the ground unable to get up. per neighbors pt is more confused, per neighbors pt has undiagnosed dementia       HPI  Viki García is a 86 y.o. female who presents to the Emergency Department via EMS for altered mental status with an unknown onset. She was last seen by her neighbors at baseline at 11:00 AM yesterday. Her neighbors went to check on the patient when they found her screen door closed, and she was found on the ground unable to get up. Neighbors reported that she is more confused compared to baseline. She has a history of undiagnosed dementia. Her neighbors are her POA.     Neighbors state the patient has been eating frozen microwave food at home. She is unable to go grocery shopping without assistance. She has a walker, but refuses to use this regularly.     Patient is DNR/DNI.     HPI is limited secondary to patient's altered mental status.     REVIEW OF SYSTEMS  See HPI for further details.     ROS is unobtainable secondary to patient's altered mental status.     PAST MEDICAL HISTORY   has a past medical history of Alcohol abuse (6/9/2016), Diabetes mellitus, type II (HCC) (6/9/2016), Headache (6/9/2016), HTN (hypertension) (6/9/2016), Hyperlipidemia (6/9/2016), Osteoarthritis (6/9/2016), Right shoulder pain (6/9/2016), and Spinal stenosis (6/9/2016).    SURGICAL HISTORY   has a past surgical history that includes other orthopedic surgery and hysterectomy, total abdominal.    SOCIAL HISTORY  Social History     Tobacco Use   • Smoking status: Never  "Smoker   • Smokeless tobacco: Never Used   Substance Use Topics   • Alcohol use: Yes     Alcohol/week: 8.4 oz     Types: 14 Cans of beer per week   • Drug use: No      Social History     Substance and Sexual Activity   Drug Use No       FAMILY HISTORY  Could not be obtained.     CURRENT MEDICATIONS  Home Medications     Reviewed by Andrew Banegas (Pharmacy Tech) on 11/12/21 at 1412  Med List Status: Complete   Medication Last Dose Status        Patient Surinder Taking any Medications                     Current medications can be seen on the nurse's note.      ALLERGIES  No Known Allergies    PHYSICAL EXAM  VITAL SIGNS: BP (!) 171/98   Pulse 90   Temp 36 °C (96.8 °F) (Temporal)   Resp 16   Ht 1.6 m (5' 3\")   Wt 54.4 kg (120 lb)   SpO2 95%   BMI 21.26 kg/m²   Constitutional:  Very thin body habitus, disheveled. No acute distress.   HENT: Normocephalic, Atraumatic, Bilateral external ears normal, Oropharynx is clear mucous membranes are dry. No oral exudates or nasal discharge.   Eyes: Pupils are equal round and reactive, EOMI, Conjunctiva normal, No discharge.   Neck: Normal range of motion, No tenderness, Supple, No stridor. No meningismus.  Lymphatic: No lymphadenopathy noted.   Cardiovascular: Regular rate and rhythm without murmur rub or gallop.  Thorax & Lungs: Clear breath sounds bilaterally without wheezes, rhonchi or rales. There is no chest wall tenderness.   Abdomen: Soft non-tender non-distended. There is no rebound or guarding. No organomegaly is appreciated. Bowel sounds are normal.  Skin: Normal without rash.   Back: No CVA or spinal tenderness.   Extremities: Intact distal pulses, No edema, No tenderness, No cyanosis, No clubbing. Capillary refill is less than 2 seconds.  Musculoskeletal: Good range of motion in all major joints. No tenderness to palpation or major deformities noted.   Neurologic: Alert, Normal motor function, Normal sensory function, No focal deficits noted. Reflexes are " normal. Shivering   Psychiatric: Affect normal, Judgment normal, Mood normal.     DIAGNOSTIC STUDIES / PROCEDURES    LABS  Labs Reviewed   CBC WITH DIFFERENTIAL - Abnormal; Notable for the following components:       Result Value    WBC 16.7 (*)     MCHC 33.5 (*)     Neutrophils-Polys 84.30 (*)     Lymphocytes 9.60 (*)     Neutrophils (Absolute) 14.06 (*)     Monos (Absolute) 0.90 (*)     All other components within normal limits   COMP METABOLIC PANEL - Abnormal; Notable for the following components:    Glucose 124 (*)     Bun 71 (*)     AST(SGOT) 54 (*)     Alkaline Phosphatase 116 (*)     All other components within normal limits   URINALYSIS - Abnormal; Notable for the following components:    Ketones Trace (*)     All other components within normal limits   ESTIMATED GFR - Abnormal; Notable for the following components:    GFR If Non  56 (*)     All other components within normal limits      All labs reviewed by me.    RADIOLOGY  CT-HEAD W/O   Final Result      1.  Extensive atrophy and white matter changes.   2.  No acute intracranial hemorrhage or territorial infarct.   3.  Small chronic LEFT frontal infarct.         The radiologist's interpretations of all radiological studies have been reviewed by me.        COURSE & MEDICAL DECISION MAKING  Nursing notes, VS, PMSFHx reviewed in chart.    1:18 PM Patient seen and examined at bedside. Discussed plan of care with the patient. She will be treated with NS Bolus infusion 1000 ml for her elderly altered level of consciousness. Ordered labs to evaluate.      3:05 PM - Patient was treated with NS infusion bolus 1000 ml for dehydration.  This is secondary to elevated BUN of 71 and dry mucous membranes with chronic debility    3:25 PM - Patient was reevaluated at bedside. Her neighbor (POA) is at her bedside. Discussed lab results with the patient and her neighbor and informed them that she does not meet criteria for hospitalization. POA states the  patient is a DNR/DNI status. Her POA was given information regarding options for outpatient monitoring including group homes, skilled facilities, and in home care giving services. More history of given by neighbor see HPI.     3:35 PM - Ordered CT Head without to rule out subdural hematoma.   4:48 PM - Review of CT head results reveal evidence for vascular dementia.  There is no evidence of subdural hematoma or mass    4:51 PM - Patient was reevaluated at bedside. Discussed CT head results with the patient and her neighbor and informed them that they were reassuring.The patient will return for new or worsening symptoms and is stable at the time of discharge. Patient and neighbor verbalizes understanding and agreement to this plan of care.      HYDRATION: Based on the patient's presentation of Dehydration and Eldery ALOC the patient was given IV fluids. IV Hydration was used because oral hydration was not adequate alone. Upon recheck following hydration, the patient was mildly improved.     6:15 PM it became clear that the patient was not going to be discharged on my watch and I have placed her into ED observation status    DISPOSITION:  Patient will be observed by Dr. Jane Verdin and there is potential that the patient could go home tonight if her neighbors can cobble together a plan of care with or without home health and case management and  is working on this currently    FINAL IMPRESSION  1. Vascular dementia without behavioral disturbance (HCC)    2. Dehydration    3. Weakness    4.  ED observation care     Sabrina FARR (Rupinderibe), am scribing for, and in the presence of, Artur Dc M.D..    Electronically signed by: Sabrina Rosenthal (Clark), 11/12/2021    Artur FARR M.D. personally performed the services described in this documentation, as scribed by Sabrina Rosenthal in my presence, and it is both accurate and complete.    C    The note accurately reflects work and decisions made by me.   Artur Dc M.D.  11/12/2021  6:20 PM

## 2021-11-13 ENCOUNTER — APPOINTMENT (OUTPATIENT)
Dept: RADIOLOGY | Facility: MEDICAL CENTER | Age: 86
DRG: 057 | End: 2021-11-13
Attending: EMERGENCY MEDICINE
Payer: MEDICARE

## 2021-11-13 PROBLEM — E87.0 HYPERNATREMIA: Status: ACTIVE | Noted: 2021-11-13

## 2021-11-13 PROBLEM — R41.82 ALTERED MENTAL STATUS: Status: ACTIVE | Noted: 2021-11-13

## 2021-11-13 PROBLEM — R74.8 ELEVATED CPK: Status: ACTIVE | Noted: 2021-11-13

## 2021-11-13 PROBLEM — R55 SYNCOPE: Status: ACTIVE | Noted: 2021-11-13
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
## 2021-11-13 PROBLEM — F10.939 ALCOHOL WITHDRAWAL (HCC): Status: ACTIVE | Noted: 2021-11-13

## 2021-11-13 LAB
ANION GAP SERPL CALC-SCNC: 13 MMOL/L (ref 7–16)
BASOPHILS # BLD AUTO: 0.1 % (ref 0–1.8)
BASOPHILS # BLD: 0.01 K/UL (ref 0–0.12)
BUN SERPL-MCNC: 58 MG/DL (ref 8–22)
CALCIUM SERPL-MCNC: 9.4 MG/DL (ref 8.5–10.5)
CHLORIDE SERPL-SCNC: 114 MMOL/L (ref 96–112)
CK SERPL-CCNC: 516 U/L (ref 0–154)
CK SERPL-CCNC: 589 U/L (ref 0–154)
CO2 SERPL-SCNC: 22 MMOL/L (ref 20–33)
CREAT SERPL-MCNC: 0.89 MG/DL (ref 0.5–1.4)
EKG IMPRESSION: NORMAL
EOSINOPHIL # BLD AUTO: 0.03 K/UL (ref 0–0.51)
EOSINOPHIL NFR BLD: 0.2 % (ref 0–6.9)
ERYTHROCYTE [DISTWIDTH] IN BLOOD BY AUTOMATED COUNT: 48.3 FL (ref 35.9–50)
GLUCOSE BLD-MCNC: 102 MG/DL (ref 65–99)
GLUCOSE SERPL-MCNC: 112 MG/DL (ref 65–99)
HCT VFR BLD AUTO: 39.2 % (ref 37–47)
HGB BLD-MCNC: 13.3 G/DL (ref 12–16)
IMM GRANULOCYTES # BLD AUTO: 0.07 K/UL (ref 0–0.11)
IMM GRANULOCYTES NFR BLD AUTO: 0.5 % (ref 0–0.9)
LYMPHOCYTES # BLD AUTO: 2.08 K/UL (ref 1–4.8)
LYMPHOCYTES NFR BLD: 16 % (ref 22–41)
MCH RBC QN AUTO: 33 PG (ref 27–33)
MCHC RBC AUTO-ENTMCNC: 33.9 G/DL (ref 33.6–35)
MCV RBC AUTO: 97.3 FL (ref 81.4–97.8)
MONOCYTES # BLD AUTO: 0.91 K/UL (ref 0–0.85)
MONOCYTES NFR BLD AUTO: 7 % (ref 0–13.4)
NEUTROPHILS # BLD AUTO: 9.89 K/UL (ref 2–7.15)
NEUTROPHILS NFR BLD: 76.2 % (ref 44–72)
NRBC # BLD AUTO: 0 K/UL
NRBC BLD-RTO: 0 /100 WBC
PLATELET # BLD AUTO: 253 K/UL (ref 164–446)
PMV BLD AUTO: 9.5 FL (ref 9–12.9)
POTASSIUM SERPL-SCNC: 3.2 MMOL/L (ref 3.6–5.5)
RBC # BLD AUTO: 4.03 M/UL (ref 4.2–5.4)
SODIUM SERPL-SCNC: 149 MMOL/L (ref 135–145)
T3FREE SERPL-MCNC: 1.31 PG/ML (ref 2–4.4)
T4 FREE SERPL-MCNC: 1.73 NG/DL (ref 0.93–1.7)
THYROPEROXIDASE AB SERPL-ACNC: 9 IU/ML (ref 0–9)
TROPONIN T SERPL-MCNC: 37 NG/L (ref 6–19)
TSH SERPL DL<=0.005 MIU/L-ACNC: 0.48 UIU/ML (ref 0.38–5.33)
VIT B12 SERPL-MCNC: 1670 PG/ML (ref 211–911)
WBC # BLD AUTO: 13 K/UL (ref 4.8–10.8)

## 2021-11-13 PROCEDURE — 770020 HCHG ROOM/CARE - TELE (206)

## 2021-11-13 PROCEDURE — 99223 1ST HOSP IP/OBS HIGH 75: CPT | Mod: AI,GC | Performed by: HOSPITALIST

## 2021-11-13 PROCEDURE — 82550 ASSAY OF CK (CPK): CPT | Mod: 91

## 2021-11-13 PROCEDURE — 700111 HCHG RX REV CODE 636 W/ 250 OVERRIDE (IP): Performed by: STUDENT IN AN ORGANIZED HEALTH CARE EDUCATION/TRAINING PROGRAM

## 2021-11-13 PROCEDURE — 82962 GLUCOSE BLOOD TEST: CPT

## 2021-11-13 PROCEDURE — 700111 HCHG RX REV CODE 636 W/ 250 OVERRIDE (IP)

## 2021-11-13 PROCEDURE — 80048 BASIC METABOLIC PNL TOTAL CA: CPT

## 2021-11-13 PROCEDURE — 71045 X-RAY EXAM CHEST 1 VIEW: CPT

## 2021-11-13 PROCEDURE — 36415 COLL VENOUS BLD VENIPUNCTURE: CPT

## 2021-11-13 PROCEDURE — 84481 FREE ASSAY (FT-3): CPT

## 2021-11-13 PROCEDURE — A9270 NON-COVERED ITEM OR SERVICE: HCPCS | Performed by: STUDENT IN AN ORGANIZED HEALTH CARE EDUCATION/TRAINING PROGRAM

## 2021-11-13 PROCEDURE — 86376 MICROSOMAL ANTIBODY EACH: CPT

## 2021-11-13 PROCEDURE — 82607 VITAMIN B-12: CPT

## 2021-11-13 PROCEDURE — 96374 THER/PROPH/DIAG INJ IV PUSH: CPT

## 2021-11-13 PROCEDURE — 84484 ASSAY OF TROPONIN QUANT: CPT

## 2021-11-13 PROCEDURE — 700105 HCHG RX REV CODE 258: Performed by: HOSPITALIST

## 2021-11-13 PROCEDURE — HZ2ZZZZ DETOXIFICATION SERVICES FOR SUBSTANCE ABUSE TREATMENT: ICD-10-PCS | Performed by: HOSPITALIST

## 2021-11-13 PROCEDURE — 84443 ASSAY THYROID STIM HORMONE: CPT

## 2021-11-13 PROCEDURE — 84439 ASSAY OF FREE THYROXINE: CPT

## 2021-11-13 PROCEDURE — 93005 ELECTROCARDIOGRAM TRACING: CPT | Performed by: STUDENT IN AN ORGANIZED HEALTH CARE EDUCATION/TRAINING PROGRAM

## 2021-11-13 PROCEDURE — 85025 COMPLETE CBC W/AUTO DIFF WBC: CPT

## 2021-11-13 PROCEDURE — 700102 HCHG RX REV CODE 250 W/ 637 OVERRIDE(OP): Performed by: STUDENT IN AN ORGANIZED HEALTH CARE EDUCATION/TRAINING PROGRAM

## 2021-11-13 RX ORDER — LORAZEPAM 2 MG/1
2 TABLET ORAL
Status: DISCONTINUED | OUTPATIENT
Start: 2021-11-13 | End: 2021-11-14

## 2021-11-13 RX ORDER — LORAZEPAM 2 MG/ML
1 INJECTION INTRAMUSCULAR
Status: DISCONTINUED | OUTPATIENT
Start: 2021-11-13 | End: 2021-11-14

## 2021-11-13 RX ORDER — LORAZEPAM 0.5 MG/1
0.5 TABLET ORAL EVERY 4 HOURS PRN
Status: DISCONTINUED | OUTPATIENT
Start: 2021-11-13 | End: 2021-11-14

## 2021-11-13 RX ORDER — LORAZEPAM 2 MG/ML
0.5 INJECTION INTRAMUSCULAR EVERY 4 HOURS PRN
Status: DISCONTINUED | OUTPATIENT
Start: 2021-11-13 | End: 2021-11-14

## 2021-11-13 RX ORDER — LORAZEPAM 1 MG/1
1 TABLET ORAL EVERY 4 HOURS PRN
Status: DISCONTINUED | OUTPATIENT
Start: 2021-11-13 | End: 2021-11-14

## 2021-11-13 RX ORDER — POTASSIUM CHLORIDE 20 MEQ/1
20 TABLET, EXTENDED RELEASE ORAL 2 TIMES DAILY
Status: DISPENSED | OUTPATIENT
Start: 2021-11-13 | End: 2021-11-14

## 2021-11-13 RX ORDER — POLYETHYLENE GLYCOL 3350 17 G/17G
1 POWDER, FOR SOLUTION ORAL
Status: DISCONTINUED | OUTPATIENT
Start: 2021-11-13 | End: 2021-11-30 | Stop reason: HOSPADM

## 2021-11-13 RX ORDER — LORAZEPAM 2 MG/ML
2 INJECTION INTRAMUSCULAR
Status: DISCONTINUED | OUTPATIENT
Start: 2021-11-13 | End: 2021-11-14

## 2021-11-13 RX ORDER — SODIUM CHLORIDE 9 MG/ML
INJECTION, SOLUTION INTRAVENOUS CONTINUOUS
Status: DISCONTINUED | OUTPATIENT
Start: 2021-11-13 | End: 2021-11-14

## 2021-11-13 RX ORDER — HALOPERIDOL 5 MG/ML
1 INJECTION INTRAMUSCULAR ONCE
Status: COMPLETED | OUTPATIENT
Start: 2021-11-13 | End: 2021-11-13

## 2021-11-13 RX ORDER — LORAZEPAM 2 MG/ML
1.5 INJECTION INTRAMUSCULAR
Status: DISCONTINUED | OUTPATIENT
Start: 2021-11-13 | End: 2021-11-14

## 2021-11-13 RX ORDER — AMOXICILLIN 250 MG
2 CAPSULE ORAL 2 TIMES DAILY
Status: DISCONTINUED | OUTPATIENT
Start: 2021-11-13 | End: 2021-11-30 | Stop reason: HOSPADM

## 2021-11-13 RX ORDER — GAUZE BANDAGE 2" X 2"
100 BANDAGE TOPICAL DAILY
Status: DISCONTINUED | OUTPATIENT
Start: 2021-11-13 | End: 2021-11-14 | Stop reason: ALTCHOICE

## 2021-11-13 RX ORDER — BISACODYL 10 MG
10 SUPPOSITORY, RECTAL RECTAL
Status: DISCONTINUED | OUTPATIENT
Start: 2021-11-13 | End: 2021-11-30 | Stop reason: HOSPADM

## 2021-11-13 RX ORDER — M-VIT,TX,IRON,MINS/CALC/FOLIC 27MG-0.4MG
1 TABLET ORAL DAILY
Status: DISCONTINUED | OUTPATIENT
Start: 2021-11-13 | End: 2021-11-30 | Stop reason: HOSPADM

## 2021-11-13 RX ORDER — LORAZEPAM 2 MG/1
4 TABLET ORAL
Status: DISCONTINUED | OUTPATIENT
Start: 2021-11-13 | End: 2021-11-14

## 2021-11-13 RX ADMIN — MULTIPLE VITAMINS W/ MINERALS TAB 1 TABLET: TAB at 17:27

## 2021-11-13 RX ADMIN — POTASSIUM CHLORIDE 20 MEQ: 1500 TABLET, EXTENDED RELEASE ORAL at 17:27

## 2021-11-13 RX ADMIN — SODIUM CHLORIDE: 9 INJECTION, SOLUTION INTRAVENOUS at 12:30

## 2021-11-13 RX ADMIN — LORAZEPAM 1.5 MG: 2 INJECTION INTRAMUSCULAR; INTRAVENOUS at 20:49

## 2021-11-13 RX ADMIN — HALOPERIDOL LACTATE 1 MG: 5 INJECTION, SOLUTION INTRAMUSCULAR at 15:03

## 2021-11-13 RX ADMIN — Medication 100 MG: at 17:27

## 2021-11-13 ASSESSMENT — LIFESTYLE VARIABLES
NAUSEA AND VOMITING: NO NAUSEA AND NO VOMITING
ORIENTATION AND CLOUDING OF SENSORIUM: DISORIENTED FOR PLACE AND / OR PERSON
TREMOR: NO TREMOR
HEADACHE, FULLNESS IN HEAD: NOT PRESENT
PAROXYSMAL SWEATS: NO SWEAT VISIBLE
TOTAL SCORE: 4
TOTAL SCORE: 16
AGITATION: NORMAL ACTIVITY
AUDITORY DISTURBANCES: NOT PRESENT
NAUSEA AND VOMITING: NO NAUSEA AND NO VOMITING
AGITATION: MODERATELY FIDGETY AND RESTLESS
VISUAL DISTURBANCES: NOT PRESENT
HEADACHE, FULLNESS IN HEAD: NOT PRESENT
VISUAL DISTURBANCES: NOT PRESENT
PAROXYSMAL SWEATS: NO SWEAT VISIBLE
ANXIETY: *
ORIENTATION AND CLOUDING OF SENSORIUM: DISORIENTED FOR PLACE AND / OR PERSON
AUDITORY DISTURBANCES: NOT PRESENT
TREMOR: *
ANXIETY: NO ANXIETY (AT EASE)

## 2021-11-13 ASSESSMENT — FIBROSIS 4 INDEX: FIB4 SCORE: 2.68

## 2021-11-13 ASSESSMENT — PAIN DESCRIPTION - PAIN TYPE: TYPE: ACUTE PAIN

## 2021-11-13 NOTE — PROGRESS NOTES
"ED Provider Progress Note  ED Observation Progress Note    Date of Service: 11/13/21    Interval History  Patient's care was transferred to me at change of shift.  She was seen yesterday by Dr. Hammonds.  She reportedly was brought to ER after being found on the ground unable to get up.  Her neighbors look after her.  They are her power of 's.  They manage her finances, her medical decisions, and do her grocery shopping for her.  She reportedly lives independently.  Neighbors felt that she was more confused than normal.  Is reported that she has undiagnosed dementia and seems to be more confused compared to baseline.  She has a walker but often does not use it/refuses to use it.  Yesterday patient's labs were fairly unremarkable with exception of a BUN of 71.  She was given fluid hydration under the presumption that she was probably dehydrated.  There is no medical reason for admission so social workers have been involved in patient's case and have been trying to find placement.  Neighbors are willing to have the patient go to some sort of memory care center.    After I reviewed the patient's lab and took over her care, I felt it would be reasonable to do a rectal exam given her BUN of 71 is that is quite high for dehydration.  Rectal exam reveals brown stool which is guaiac negative.  Thankfully she does not have GI bleed.  I repeated some lab work just to see what her BUN was doing over the course last 24 hours.  BUN is coming down, but her sodium is now at 149.  This may be certainly accounting for her confusion.  We have attempted to walk her several times during her ED stay but she has been unable.  With a sodium of 149, I think she now qualifies for admission to the hospital.  I spoke with the [facility] internal medicine residents on call and they will kindly evaluate the patient hospitalization.    Physical Exam  /59   Pulse (!) 58   Temp 36.1 °C (97 °F) (Temporal)   Resp 15   Ht 1.6 m (5' 3\")   Wt " 54.4 kg (120 lb)   SpO2 95%   BMI 21.26 kg/m² .    Constitutional: Awake and alert. Nontoxic.  Appears frail  HENT: Dry lips.  Very dry mucous membranes.  Eyes: Grossly normal  Neck: Normal range of motion  Cardiovascular: Normal heart rate   Thorax & Lungs: No respiratory distress  Abdomen: Nontender  Skin:  No pathologic rash.   Extremities: Well perfused.  No shortening or rotation of extremities.  Psychiatric: Affect normal    Labs  Results for orders placed or performed during the hospital encounter of 11/12/21   CBC WITH DIFFERENTIAL   Result Value Ref Range    WBC 16.7 (H) 4.8 - 10.8 K/uL    RBC 4.79 4.20 - 5.40 M/uL    Hemoglobin 15.6 12.0 - 16.0 g/dL    Hematocrit 46.5 37.0 - 47.0 %    MCV 97.1 81.4 - 97.8 fL    MCH 32.6 27.0 - 33.0 pg    MCHC 33.5 (L) 33.6 - 35.0 g/dL    RDW 47.7 35.9 - 50.0 fL    Platelet Count 317 164 - 446 K/uL    MPV 9.9 9.0 - 12.9 fL    Neutrophils-Polys 84.30 (H) 44.00 - 72.00 %    Lymphocytes 9.60 (L) 22.00 - 41.00 %    Monocytes 5.40 0.00 - 13.40 %    Eosinophils 0.00 0.00 - 6.90 %    Basophils 0.10 0.00 - 1.80 %    Immature Granulocytes 0.60 0.00 - 0.90 %    Nucleated RBC 0.00 /100 WBC    Neutrophils (Absolute) 14.06 (H) 2.00 - 7.15 K/uL    Lymphs (Absolute) 1.60 1.00 - 4.80 K/uL    Monos (Absolute) 0.90 (H) 0.00 - 0.85 K/uL    Eos (Absolute) 0.00 0.00 - 0.51 K/uL    Baso (Absolute) 0.01 0.00 - 0.12 K/uL    Immature Granulocytes (abs) 0.10 0.00 - 0.11 K/uL    NRBC (Absolute) 0.00 K/uL   COMP METABOLIC PANEL   Result Value Ref Range    Sodium 142 135 - 145 mmol/L    Potassium 4.0 3.6 - 5.5 mmol/L    Chloride 107 96 - 112 mmol/L    Co2 23 20 - 33 mmol/L    Anion Gap 12.0 7.0 - 16.0    Glucose 124 (H) 65 - 99 mg/dL    Bun 71 (HH) 8 - 22 mg/dL    Creatinine 0.94 0.50 - 1.40 mg/dL    Calcium 9.8 8.5 - 10.5 mg/dL    AST(SGOT) 54 (H) 12 - 45 U/L    ALT(SGPT) 47 2 - 50 U/L    Alkaline Phosphatase 116 (H) 30 - 99 U/L    Total Bilirubin 0.9 0.1 - 1.5 mg/dL    Albumin 4.2 3.2 - 4.9 g/dL     Total Protein 7.7 6.0 - 8.2 g/dL    Globulin 3.5 1.9 - 3.5 g/dL    A-G Ratio 1.2 g/dL   URINALYSIS (UA)    Specimen: Urine   Result Value Ref Range    Color Yellow     Character Clear     Specific Gravity 1.022 <1.035    Ph 5.0 5.0 - 8.0    Glucose Negative Negative mg/dL    Ketones Trace (A) Negative mg/dL    Protein Negative Negative mg/dL    Bilirubin Negative Negative    Urobilinogen, Urine 0.2 Negative    Nitrite Negative Negative    Leukocyte Esterase Negative Negative    Occult Blood Negative Negative    Micro Urine Req see below    ESTIMATED GFR   Result Value Ref Range    GFR If African American >60 >60 mL/min/1.73 m 2    GFR If Non  56 (A) >60 mL/min/1.73 m 2   Blood Culture,Hold   Result Value Ref Range    Blood Culture Hold Collected    CBC WITH DIFFERENTIAL   Result Value Ref Range    WBC 13.0 (H) 4.8 - 10.8 K/uL    RBC 4.03 (L) 4.20 - 5.40 M/uL    Hemoglobin 13.3 12.0 - 16.0 g/dL    Hematocrit 39.2 37.0 - 47.0 %    MCV 97.3 81.4 - 97.8 fL    MCH 33.0 27.0 - 33.0 pg    MCHC 33.9 33.6 - 35.0 g/dL    RDW 48.3 35.9 - 50.0 fL    Platelet Count 253 164 - 446 K/uL    MPV 9.5 9.0 - 12.9 fL    Neutrophils-Polys 76.20 (H) 44.00 - 72.00 %    Lymphocytes 16.00 (L) 22.00 - 41.00 %    Monocytes 7.00 0.00 - 13.40 %    Eosinophils 0.20 0.00 - 6.90 %    Basophils 0.10 0.00 - 1.80 %    Immature Granulocytes 0.50 0.00 - 0.90 %    Nucleated RBC 0.00 /100 WBC    Neutrophils (Absolute) 9.89 (H) 2.00 - 7.15 K/uL    Lymphs (Absolute) 2.08 1.00 - 4.80 K/uL    Monos (Absolute) 0.91 (H) 0.00 - 0.85 K/uL    Eos (Absolute) 0.03 0.00 - 0.51 K/uL    Baso (Absolute) 0.01 0.00 - 0.12 K/uL    Immature Granulocytes (abs) 0.07 0.00 - 0.11 K/uL    NRBC (Absolute) 0.00 K/uL   BASIC METABOLIC PANEL   Result Value Ref Range    Sodium 149 (H) 135 - 145 mmol/L    Potassium 3.2 (L) 3.6 - 5.5 mmol/L    Chloride 114 (H) 96 - 112 mmol/L    Co2 22 20 - 33 mmol/L    Glucose 112 (H) 65 - 99 mg/dL    Bun 58 (H) 8 - 22 mg/dL     Creatinine 0.89 0.50 - 1.40 mg/dL    Calcium 9.4 8.5 - 10.5 mg/dL    Anion Gap 13.0 7.0 - 16.0   TSH   Result Value Ref Range    TSH 0.480 0.380 - 5.330 uIU/mL   FREE THYROXINE   Result Value Ref Range    Free T-4 1.73 (H) 0.93 - 1.70 ng/dL   ESTIMATED GFR   Result Value Ref Range    GFR If African American >60 >60 mL/min/1.73 m 2    GFR If Non African American >60 >60 mL/min/1.73 m 2   CREATINE KINASE   Result Value Ref Range    CPK Total 589 (H) 0 - 154 U/L       Radiology  DX-CHEST-PORTABLE (1 VIEW)   Final Result      Limited exam showing no acute cardiopulmonary disease.      CT-HEAD W/O   Final Result      1.  Extensive atrophy and white matter changes.   2.  No acute intracranial hemorrhage or territorial infarct.   3.  Small chronic LEFT frontal infarct.            Problem List  1.   1. Vascular dementia without behavioral disturbance (HCC)     2. Dehydration     3. Weakness     4. Hypernatremia     5. Hypokalemia       Patient will be hospitalized under the care of the [facility] internal medicine team.  Please see my ED discharge summary as patient was in ED observation patient.    This dictation has been created using voice recognition software. The accuracy of the dictation is limited by the abilities of the software. I expect there may be some errors of grammar and possibly content. I made every attempt to manually correct the errors within my dictation. However, errors related to voice recognition software may still exist and should be interpreted within the appropriate context.    Electronically signed by: Anni Reyes M.D., 11/13/2021 8:05 AM

## 2021-11-13 NOTE — ED NOTES
Pt with frequent yelling at those passing by room, requiring frequent reorientation. Admitting made ernie re and orders received.

## 2021-11-13 NOTE — ED NOTES
Pt intermittently yelling out. Pt with purewick in place, says she has to pee but unable to stand to transfer or go to bathroom. Appears to not understand functionality of pure wick despite repeat instructions. Straight cath performed with EDT, 600cc of urine noted as output. Pt cleaned ab provided with fresh linen. Pt continues to intermittently yell out.

## 2021-11-13 NOTE — H&P
"History & Physical Note    Date of Admission: 11/13/2021  Admission Status: Inpatient  [facility] Team: SUSAN  Attending: Sean Boucher M.D.   Senior Resident: Dr. Martin  Contact Number: [phone]    Chief Complaint: Altered Mental Status    History of Present Illness (HPI): Viki is a 86 y.o. female w/ phmx of DMII, HTN, HLD, OA, and alcohol use found down by her neighbors, BIBA on 11/11/21 for evaluation. On presentation, workup was unremarkable with exeception of BUN of 71, she was given 1L N/S and pending placement. Repeat labs this morning with improved BUN of 59 although Na now 149. Our team initially contacted for admission for hypernatremia.   Patient evaluated, was severely dehydrated, AO x 1 and unable to answer questions. She does not follow commands, keeps repeating \"please help me.\"   History is obtained from Maegan, her neighbor and POA. Per Maegan, patient with progressive cognitive decline, more rapidly in the last year. Per Maegan, she is AOx1 at baseline, does not recognize time or where she is (recently went to get COVID booster, thought she was at home despite her friend's re-iteration). Maegan reports that she has been more confused in the last 3 months, has had more frequent falls in the last month, has had poor oral intake. Maegan and her  checks on her daily, brings her groceries and take her out to appointment. Maegan reports that her  found the patient on the floor of her bedroom on Friday morning, unconscious and thus called EMS. Maegan reports that the patient drinks at least 6 cans of Bud light daily, last drink was Thursday night.   We discussed goal of care,  Advance directives reviewed and Maegan confirmed DNR/DNI status, also express that the patient would not want ICU transfer. We also discussed evaluation of her syncope and she would like to proceed with the workup. Maegan reports that she and the patient has been discussing memory care communities over the last year and " "would like to ultimately transfer her there after discharge. Maegan is concerned with the patient's frequent fall and would like her to receive physical therapy.     Review of Systems: Unable to complete due to mental status    Past Medical History:   Past Medical History was reviewed with patient.   has a past medical history of Alcohol abuse (6/9/2016), Diabetes mellitus, type II (HCC) (6/9/2016), Headache (6/9/2016), HTN (hypertension) (6/9/2016), Hyperlipidemia (6/9/2016), Osteoarthritis (6/9/2016), Right shoulder pain (6/9/2016), and Spinal stenosis (6/9/2016).    Past Surgical History: Past Surgical History was reviewed with patient.   has a past surgical history that includes other orthopedic surgery and hysterectomy, total abdominal.    Medications: Medications have been reviewed with patient.  None        Allergies: Allergies have been reviewed with patient.  No Known Allergies    Family History:  family history is not on file.     Social History: Per POA   Tobacco: denies  Alcohol: daily   Recreational drugs (illegal and prescription):  Unknown   Employment: retired  Activity Level: independent with ADLs, requires help with IADL  Living situation:  Home, alone with neighbor care   Recent travel:  denies  Primary Care Provider: reviewed Chang Soares M.D.  Other (stressors, spirituality, exposures):  None      Physical Exam:  Weight/BMI: Body mass index is 21.26 kg/m².  /59   Pulse (!) 58   Temp 36.1 °C (97 °F) (Temporal)   Resp 15   Ht 1.6 m (5' 3\")   Wt 54.4 kg (120 lb)   SpO2 95%   Vitals:    11/13/21 0600 11/13/21 0615 11/13/21 0701 11/13/21 0702   BP: 111/52  129/59    Pulse: (!) 53 74  (!) 58   Resp: 19 16  15   Temp:       TempSrc:       SpO2: 97% 93%  95%   Weight:       Height:         Oxygen Therapy:  Pulse Oximetry: 95 %, O2 (LPM): 0, O2 Delivery Device: None - Room Air    Physical exam:  Constitutional: Thin, disheveled, in mild distress. A&O x3  HENMT:  Normocephalic, Atraumatic, " Dry mucosa  Eyes:  PERRLA, EOMI, Conjunctiva normal  Neck:  Supple, Full range of motion, No stridor  Cardiovascular:  Regular rate and rhythm, No murmurs, No rubs, No gallops.   Lungs: Respiratory effort is normal, no crackles, no wheezing.  Extremities: Good pedal pulses b/l, no edema, 5/5 strength.  Abdomen: Bowel sounds x4, Soft, Non-tender, Non-distended, No guarding, No rebound, No masses.  Neurologic: Good sensations, Cranial nerves II through XII grossly intact. No focal deficits noted.      Labs:   Recent Labs     11/12/21  1312 11/13/21  0858   WBC 16.7* 13.0*   RBC 4.79 4.03*   HEMOGLOBIN 15.6 13.3   HEMATOCRIT 46.5 39.2   MCV 97.1 97.3   MCH 32.6 33.0   RDW 47.7 48.3   PLATELETCT 317 253   MPV 9.9 9.5   NEUTSPOLYS 84.30* 76.20*   LYMPHOCYTES 9.60* 16.00*   MONOCYTES 5.40 7.00   EOSINOPHILS 0.00 0.20   BASOPHILS 0.10 0.10       Recent Labs     11/12/21  1312 11/13/21  0858   SODIUM 142 149*   POTASSIUM 4.0 3.2*   CHLORIDE 107 114*   CO2 23 22   GLUCOSE 124* 112*   BUN 71* 58*   CPKTOTAL  --  589*       EKG: Per my read, NSR without ST changes concerning for ischemia     Imaging:   DX-CHEST-PORTABLE (1 VIEW)   Final Result      Limited exam showing no acute cardiopulmonary disease.      CT-HEAD W/O   Final Result      1.  Extensive atrophy and white matter changes.   2.  No acute intracranial hemorrhage or territorial infarct.   3.  Small chronic LEFT frontal infarct.          Previous Data Review: reviewed    Problem Representation: Viki is a 86 y.o. female w/ phmx of DMII, HTN, HLD, OA, and alcohol use found down by her neighbors, admitted for syncope, with concerns for alcohol withdrawal     * Syncope  Assessment & Plan  - found down by friend   - EKG without ST changes concerning for ischemia   - TTE pending   - FT4 mildly elevated, ?Hyperthyroidism, will get TRAb assay and T3 for further evaluation      Alcohol withdrawal (HCC)  Assessment & Plan  - per nursing, patient progressively more  agitated, delirium vs early alcohol withdrawal   - drinks 6x 12oz Bud light daily, last drink Thursday night  - will place on CIWA for monitoring, holding benzo for now due concerns for worsening of delirium   - night team to reassess for ativan need  - IVF with N/S at 200mL/hr   - start thiamine and multivitamins       Altered mental status  Assessment & Plan  - leukocytosis without left shift, improved with IVF hydration, likely reactive than infectious etiology   - progressive decline in cognition over the last year, likely secondary to alcohol use. Now concerning for delirium vs alcohol withdrawal   - see A&P for Alcohol Withdrawal     Fall  Assessment & Plan  - syncope vs intoxication vs hyperthyroidisim  - see syncope A&P   - Pt/OT eval     Elevated CPK  Assessment & Plan  - likely secondary to dehydration. Aggressive fluid resuscitation   - repeat CPK at 6pm, can consider lowering fluid rate if improved    Hypernatremia  Assessment & Plan  - likely secondary to dehydration from poor oral intake  - 1L N/S given, aggressive fluid resuscitation w/ NS @200ml/hr

## 2021-11-13 NOTE — DISCHARGE PLANNING
MSW received call from [facility] ([phone]). [facility] stated that they would like to consider pt but as she has stopped walking they would like her to go to SNF.     MSW spoke to pt's neighbor/POA Ayla. MSW updated them that pt is now getting admitted. Ayla stated they are wanting pt to go to [facility]. Ayla stated she spoke with [facility] and agree that pt's needs SNF before admission there. MSW explained SNF process. Pt needs 3 midnight inpatient with her Medicare and will need to be accepted by the facility. Ayla stated she wants pt to go to either [facility] or [facility] then go to [facility]. MSW submitted faxed the DPA the choice form. PT/OT essence requested as well.    Plan is for SNF after medically cleared then to [facility] care.     OVIDIO Carroll started [facility]. Currently in Manual Review.

## 2021-11-13 NOTE — DISCHARGE SUMMARY
"  ED Observation Discharge Summary    Patient:Viki García  Patient : 1935  Patient MRN: [id number]  Patient PCP: Chang Soares M.D.    Admit Date: 2021  Discharge Date and Time: 21 11:22 AM  Discharge Diagnosis: Altered mental status, hypernatremia, hypokalemia regional  Discharge Attending: Anni Reyes M.D.  Discharge Service: ED Observation    ED Course  Viki is a 86 y.o. female who was evaluated at [facility] [place] for worsening confusion from baseline/altered mental status.  Neighbors found the patient on the ground yesterday at her home, unable to get herself up and unable to walk.  She lives independently and normally performs all of her ADLs.  However, she often refuses to use her walker.  She was seen yesterday by the emergency physician on duty.  She was evaluated.  It was determined that she was not a candidate for hospitalization as she did not have any medical reason to be admitted.   has been communicating with neighbors, who are her POA, to try to find her some placement.  It was thought that patient might be able to go to memory care.  Over the night nursing staff try to get the patient up on a walker.  She was unable due to being too weak.  I evaluated the patient.  She has very dry mucous membranes.  Labs were reviewed.  She had a BUN yesterday of 71.  She was given some IV fluids.  I elected to do a rectal exam and repeat the labs.  Rectal exam is negative for melena.  She is guaiac negative.  Repeat labs reveal a downward trending BUN, but her sodium is now 149.  This could be accounting for her confusion and weakness.  Plan was to hospitalize her now that she has electrolyte disturbance and I spoke with the [facility] internal medicine residents and they will kindly evaluate the patient hospitalization.    Discharge Exam:  /59   Pulse (!) 58   Temp 36.1 °C (97 °F) (Temporal)   Resp 15   Ht 1.6 m (5' 3\")   Wt 54.4 kg (120 lb)  "  SpO2 95%   BMI 21.26 kg/m² .    Constitutional: Awake and alert. Nontoxic.  Appears weak and frail  HENT: Dry lips.  Dry mucous membranes.  Eyes: Grossly normal  Neck: Normal range of motion  Cardiovascular: Normal heart rate   Thorax & Lungs: No respiratory distress  Abdomen: Nontender  Skin:  No pathologic rash.   Extremities: Well perfused.  No shortening or rotation of extremities.  Psychiatric: Affect normal    Labs  Results for orders placed or performed during the hospital encounter of 11/12/21   CBC WITH DIFFERENTIAL   Result Value Ref Range    WBC 16.7 (H) 4.8 - 10.8 K/uL    RBC 4.79 4.20 - 5.40 M/uL    Hemoglobin 15.6 12.0 - 16.0 g/dL    Hematocrit 46.5 37.0 - 47.0 %    MCV 97.1 81.4 - 97.8 fL    MCH 32.6 27.0 - 33.0 pg    MCHC 33.5 (L) 33.6 - 35.0 g/dL    RDW 47.7 35.9 - 50.0 fL    Platelet Count 317 164 - 446 K/uL    MPV 9.9 9.0 - 12.9 fL    Neutrophils-Polys 84.30 (H) 44.00 - 72.00 %    Lymphocytes 9.60 (L) 22.00 - 41.00 %    Monocytes 5.40 0.00 - 13.40 %    Eosinophils 0.00 0.00 - 6.90 %    Basophils 0.10 0.00 - 1.80 %    Immature Granulocytes 0.60 0.00 - 0.90 %    Nucleated RBC 0.00 /100 WBC    Neutrophils (Absolute) 14.06 (H) 2.00 - 7.15 K/uL    Lymphs (Absolute) 1.60 1.00 - 4.80 K/uL    Monos (Absolute) 0.90 (H) 0.00 - 0.85 K/uL    Eos (Absolute) 0.00 0.00 - 0.51 K/uL    Baso (Absolute) 0.01 0.00 - 0.12 K/uL    Immature Granulocytes (abs) 0.10 0.00 - 0.11 K/uL    NRBC (Absolute) 0.00 K/uL   COMP METABOLIC PANEL   Result Value Ref Range    Sodium 142 135 - 145 mmol/L    Potassium 4.0 3.6 - 5.5 mmol/L    Chloride 107 96 - 112 mmol/L    Co2 23 20 - 33 mmol/L    Anion Gap 12.0 7.0 - 16.0    Glucose 124 (H) 65 - 99 mg/dL    Bun 71 (HH) 8 - 22 mg/dL    Creatinine 0.94 0.50 - 1.40 mg/dL    Calcium 9.8 8.5 - 10.5 mg/dL    AST(SGOT) 54 (H) 12 - 45 U/L    ALT(SGPT) 47 2 - 50 U/L    Alkaline Phosphatase 116 (H) 30 - 99 U/L    Total Bilirubin 0.9 0.1 - 1.5 mg/dL    Albumin 4.2 3.2 - 4.9 g/dL    Total Protein  7.7 6.0 - 8.2 g/dL    Globulin 3.5 1.9 - 3.5 g/dL    A-G Ratio 1.2 g/dL   URINALYSIS (UA)    Specimen: Urine   Result Value Ref Range    Color Yellow     Character Clear     Specific Gravity 1.022 <1.035    Ph 5.0 5.0 - 8.0    Glucose Negative Negative mg/dL    Ketones Trace (A) Negative mg/dL    Protein Negative Negative mg/dL    Bilirubin Negative Negative    Urobilinogen, Urine 0.2 Negative    Nitrite Negative Negative    Leukocyte Esterase Negative Negative    Occult Blood Negative Negative    Micro Urine Req see below    ESTIMATED GFR   Result Value Ref Range    GFR If African American >60 >60 mL/min/1.73 m 2    GFR If Non  56 (A) >60 mL/min/1.73 m 2   Blood Culture,Hold   Result Value Ref Range    Blood Culture Hold Collected    CBC WITH DIFFERENTIAL   Result Value Ref Range    WBC 13.0 (H) 4.8 - 10.8 K/uL    RBC 4.03 (L) 4.20 - 5.40 M/uL    Hemoglobin 13.3 12.0 - 16.0 g/dL    Hematocrit 39.2 37.0 - 47.0 %    MCV 97.3 81.4 - 97.8 fL    MCH 33.0 27.0 - 33.0 pg    MCHC 33.9 33.6 - 35.0 g/dL    RDW 48.3 35.9 - 50.0 fL    Platelet Count 253 164 - 446 K/uL    MPV 9.5 9.0 - 12.9 fL    Neutrophils-Polys 76.20 (H) 44.00 - 72.00 %    Lymphocytes 16.00 (L) 22.00 - 41.00 %    Monocytes 7.00 0.00 - 13.40 %    Eosinophils 0.20 0.00 - 6.90 %    Basophils 0.10 0.00 - 1.80 %    Immature Granulocytes 0.50 0.00 - 0.90 %    Nucleated RBC 0.00 /100 WBC    Neutrophils (Absolute) 9.89 (H) 2.00 - 7.15 K/uL    Lymphs (Absolute) 2.08 1.00 - 4.80 K/uL    Monos (Absolute) 0.91 (H) 0.00 - 0.85 K/uL    Eos (Absolute) 0.03 0.00 - 0.51 K/uL    Baso (Absolute) 0.01 0.00 - 0.12 K/uL    Immature Granulocytes (abs) 0.07 0.00 - 0.11 K/uL    NRBC (Absolute) 0.00 K/uL   BASIC METABOLIC PANEL   Result Value Ref Range    Sodium 149 (H) 135 - 145 mmol/L    Potassium 3.2 (L) 3.6 - 5.5 mmol/L    Chloride 114 (H) 96 - 112 mmol/L    Co2 22 20 - 33 mmol/L    Glucose 112 (H) 65 - 99 mg/dL    Bun 58 (H) 8 - 22 mg/dL    Creatinine 0.89 0.50 -  1.40 mg/dL    Calcium 9.4 8.5 - 10.5 mg/dL    Anion Gap 13.0 7.0 - 16.0   TSH   Result Value Ref Range    TSH 0.480 0.380 - 5.330 uIU/mL   FREE THYROXINE   Result Value Ref Range    Free T-4 1.73 (H) 0.93 - 1.70 ng/dL   ESTIMATED GFR   Result Value Ref Range    GFR If African American >60 >60 mL/min/1.73 m 2    GFR If Non African American >60 >60 mL/min/1.73 m 2   CREATINE KINASE   Result Value Ref Range    CPK Total 589 (H) 0 - 154 U/L       Radiology  DX-CHEST-PORTABLE (1 VIEW)   Final Result      Limited exam showing no acute cardiopulmonary disease.      CT-HEAD W/O   Final Result      1.  Extensive atrophy and white matter changes.   2.  No acute intracranial hemorrhage or territorial infarct.   3.  Small chronic LEFT frontal infarct.          Disposition: Patient will be hospitalized under the care of the [facility] internal medicine team.      Discharge Condition: Guarded    This dictation has been created using voice recognition software. The accuracy of the dictation is limited by the abilities of the software. I expect there may be some errors of grammar and possibly content. I made every attempt to manually correct the errors within my dictation. However, errors related to voice recognition software may still exist and should be interpreted within the appropriate context.    Electronically signed by: Anni Reyes M.D., 11/13/2021 11:22 AM

## 2021-11-13 NOTE — PROGRESS NOTES
Pt with h/o of dementia? p/w dehydration s/p 1 L NS developed worsening hypernatremia to 149.    We need to establish baseline status and goals of care, currently AO x 1 to better dictate change in mentation. She purported history of dementia but unclear what this means.    Her TSH normal, Ct scan NAICP.  -needs Vit b12 to complete dementia workup and MOCA once fluid resucicated  - she p/w  only received 1 L NS on 11/12-- insensuate losses ~ 2L so she lost volume and being confused does not eat actively so had elevation of NA to 149 without kidney dyfunction. She is about 1 L down.  - fluid bolus 1 L @ 250cc/hr x 4 hours recheck sodium levels and mentation  - 1:1 feed to ensure drinks 2-3L/day  - trend CPK q12 as first 500 until downtrends     - We have to speak to HCPOA to get baseline and establish goals, I.e. comfort, etc which dictate subsequent w/u

## 2021-11-13 NOTE — DISCHARGE PLANNING
MSW received call from bedside RN. Pt has been living independently and her neighbors are her POA. Pt has hx of dementia. Pt has been falling recently and the neighbors are worried they are unable to care for pt any longer.     MSW met with pt's neighbor Nena ([phone]). Nena stated she is pt's DPOA for medical and financial. Pt's   10 years ago and she has no children. Pt lives alone in her own home at this time. Before admission to the ER pt ambulates independently. She does have a walker but does not want to use it. Pt's neighbors check on her frequently. They grocery shop for her but she performs all other ADLs. Pt also cleans her own home.Per Nena, pt makes around $2,500 to $3,000 a month.     MSW explained different options for care. Caregivers, Group homes and memory care. Pt's POAs were going to go home and talk to some people about staying with pt while they figured out options. Nena stated they are hesitant about taking pt home. MSW explained that they will have to come up with a plan in the meantime until they can get pt into a facility/group home or get caregivers.     MSW called pt's POA Nena and informed them that pt is medically clear and has no medically need for admission into the hospital. Pt's POA wanted to know what pt's status is now and if she can walk. MSW stated that bedside RN is working on walking pt right now. Will call POA back after they are able to get pt up and walking.

## 2021-11-13 NOTE — DISCHARGE PLANNING
MSW spoke to pt's neighbor/POA Ayla. As pt needs more assistance they cannot take her back home at this time. They are currently calling memory care facilities and are trying to work on getting pt in one of them. Ayla stated she would call MSW back as soon as they have a facility they want. MSW updated ERP and bedside RN. MSW requested ERP get a COVID test and quantiferon-TB gold test completed.

## 2021-11-14 ENCOUNTER — APPOINTMENT (OUTPATIENT)
Dept: CARDIOLOGY | Facility: MEDICAL CENTER | Age: 86
DRG: 057 | End: 2021-11-14
Attending: STUDENT IN AN ORGANIZED HEALTH CARE EDUCATION/TRAINING PROGRAM
Payer: MEDICARE

## 2021-11-14 PROBLEM — Z79.899 DVT PROPHYLAXIS: Status: ACTIVE | Noted: 2021-11-14

## 2021-11-14 PROBLEM — E86.1 HYPOVOLEMIA DEHYDRATION: Status: ACTIVE | Noted: 2021-11-14

## 2021-11-14 PROBLEM — R53.81 DEBILITY: Status: ACTIVE | Noted: 2021-11-14

## 2021-11-14 PROBLEM — G93.40 ENCEPHALOPATHY: Status: ACTIVE | Noted: 2021-11-14

## 2021-11-14 LAB
ALBUMIN SERPL BCP-MCNC: 2.7 G/DL (ref 3.2–4.9)
ALBUMIN/GLOB SERPL: 0.9 G/DL
ALP SERPL-CCNC: 88 U/L (ref 30–99)
ALT SERPL-CCNC: 32 U/L (ref 2–50)
ANION GAP SERPL CALC-SCNC: 13 MMOL/L (ref 7–16)
ANION GAP SERPL CALC-SCNC: 7 MMOL/L (ref 7–16)
ANION GAP SERPL CALC-SCNC: 9 MMOL/L (ref 7–16)
AST SERPL-CCNC: 29 U/L (ref 12–45)
BASOPHILS # BLD AUTO: 0.1 % (ref 0–1.8)
BASOPHILS # BLD: 0.01 K/UL (ref 0–0.12)
BILIRUB SERPL-MCNC: 1 MG/DL (ref 0.1–1.5)
BUN SERPL-MCNC: 23 MG/DL (ref 8–22)
BUN SERPL-MCNC: 31 MG/DL (ref 8–22)
BUN SERPL-MCNC: 33 MG/DL (ref 8–22)
CALCIUM SERPL-MCNC: 8.9 MG/DL (ref 8.5–10.5)
CALCIUM SERPL-MCNC: 9 MG/DL (ref 8.5–10.5)
CALCIUM SERPL-MCNC: 9.2 MG/DL (ref 8.5–10.5)
CHLORIDE SERPL-SCNC: 109 MMOL/L (ref 96–112)
CHLORIDE SERPL-SCNC: 119 MMOL/L (ref 96–112)
CHLORIDE SERPL-SCNC: 122 MMOL/L (ref 96–112)
CO2 SERPL-SCNC: 19 MMOL/L (ref 20–33)
CO2 SERPL-SCNC: 20 MMOL/L (ref 20–33)
CO2 SERPL-SCNC: 22 MMOL/L (ref 20–33)
CREAT SERPL-MCNC: 0.55 MG/DL (ref 0.5–1.4)
CREAT SERPL-MCNC: 0.57 MG/DL (ref 0.5–1.4)
CREAT SERPL-MCNC: 0.61 MG/DL (ref 0.5–1.4)
EKG IMPRESSION: NORMAL
EOSINOPHIL # BLD AUTO: 0.12 K/UL (ref 0–0.51)
EOSINOPHIL NFR BLD: 1.2 % (ref 0–6.9)
ERYTHROCYTE [DISTWIDTH] IN BLOOD BY AUTOMATED COUNT: 52.2 FL (ref 35.9–50)
ETHANOL BLD-MCNC: <10.1 MG/DL (ref 0–10)
FOLATE SERPL-MCNC: 8.2 NG/ML
GLOBULIN SER CALC-MCNC: 3.1 G/DL (ref 1.9–3.5)
GLUCOSE BLD-MCNC: 126 MG/DL (ref 65–99)
GLUCOSE SERPL-MCNC: 93 MG/DL (ref 65–99)
GLUCOSE SERPL-MCNC: 96 MG/DL (ref 65–99)
GLUCOSE SERPL-MCNC: 97 MG/DL (ref 65–99)
HCT VFR BLD AUTO: 40 % (ref 37–47)
HGB BLD-MCNC: 12.8 G/DL (ref 12–16)
IMM GRANULOCYTES # BLD AUTO: 0.03 K/UL (ref 0–0.11)
IMM GRANULOCYTES NFR BLD AUTO: 0.3 % (ref 0–0.9)
LACTATE BLD-SCNC: 1.1 MMOL/L (ref 0.5–2)
LACTATE BLD-SCNC: 1.7 MMOL/L (ref 0.5–2)
LACTATE BLD-SCNC: 1.8 MMOL/L (ref 0.5–2)
LACTATE BLD-SCNC: 1.8 MMOL/L (ref 0.5–2)
LV EJECT FRACT  99904: 60
LV EJECT FRACT MOD 4C 99902: 53.23
LYMPHOCYTES # BLD AUTO: 2.22 K/UL (ref 1–4.8)
LYMPHOCYTES NFR BLD: 21.7 % (ref 22–41)
MAGNESIUM SERPL-MCNC: 2.1 MG/DL (ref 1.5–2.5)
MCH RBC QN AUTO: 32.6 PG (ref 27–33)
MCHC RBC AUTO-ENTMCNC: 32 G/DL (ref 33.6–35)
MCV RBC AUTO: 101.8 FL (ref 81.4–97.8)
MONOCYTES # BLD AUTO: 0.84 K/UL (ref 0–0.85)
MONOCYTES NFR BLD AUTO: 8.2 % (ref 0–13.4)
NEUTROPHILS # BLD AUTO: 6.99 K/UL (ref 2–7.15)
NEUTROPHILS NFR BLD: 68.5 % (ref 44–72)
NRBC # BLD AUTO: 0 K/UL
NRBC BLD-RTO: 0 /100 WBC
PLATELET # BLD AUTO: 203 K/UL (ref 164–446)
PMV BLD AUTO: 9.9 FL (ref 9–12.9)
POTASSIUM SERPL-SCNC: 3.2 MMOL/L (ref 3.6–5.5)
POTASSIUM SERPL-SCNC: 3.4 MMOL/L (ref 3.6–5.5)
POTASSIUM SERPL-SCNC: 3.7 MMOL/L (ref 3.6–5.5)
PROT SERPL-MCNC: 5.8 G/DL (ref 6–8.2)
RBC # BLD AUTO: 3.93 M/UL (ref 4.2–5.4)
SODIUM SERPL-SCNC: 142 MMOL/L (ref 135–145)
SODIUM SERPL-SCNC: 148 MMOL/L (ref 135–145)
SODIUM SERPL-SCNC: 150 MMOL/L (ref 135–145)
TROPONIN T SERPL-MCNC: 34 NG/L (ref 6–19)
WBC # BLD AUTO: 10.2 K/UL (ref 4.8–10.8)

## 2021-11-14 PROCEDURE — 700111 HCHG RX REV CODE 636 W/ 250 OVERRIDE (IP)

## 2021-11-14 PROCEDURE — 93005 ELECTROCARDIOGRAM TRACING: CPT | Performed by: STUDENT IN AN ORGANIZED HEALTH CARE EDUCATION/TRAINING PROGRAM

## 2021-11-14 PROCEDURE — 700111 HCHG RX REV CODE 636 W/ 250 OVERRIDE (IP): Performed by: STUDENT IN AN ORGANIZED HEALTH CARE EDUCATION/TRAINING PROGRAM

## 2021-11-14 PROCEDURE — 83735 ASSAY OF MAGNESIUM: CPT

## 2021-11-14 PROCEDURE — 770020 HCHG ROOM/CARE - TELE (206)

## 2021-11-14 PROCEDURE — 93306 TTE W/DOPPLER COMPLETE: CPT | Mod: 26 | Performed by: INTERNAL MEDICINE

## 2021-11-14 PROCEDURE — 85025 COMPLETE CBC W/AUTO DIFF WBC: CPT

## 2021-11-14 PROCEDURE — 93010 ELECTROCARDIOGRAM REPORT: CPT | Performed by: INTERNAL MEDICINE

## 2021-11-14 PROCEDURE — 82077 ASSAY SPEC XCP UR&BREATH IA: CPT

## 2021-11-14 PROCEDURE — 80053 COMPREHEN METABOLIC PANEL: CPT

## 2021-11-14 PROCEDURE — 80048 BASIC METABOLIC PNL TOTAL CA: CPT

## 2021-11-14 PROCEDURE — 93306 TTE W/DOPPLER COMPLETE: CPT

## 2021-11-14 PROCEDURE — 700105 HCHG RX REV CODE 258

## 2021-11-14 PROCEDURE — 84484 ASSAY OF TROPONIN QUANT: CPT

## 2021-11-14 PROCEDURE — 82962 GLUCOSE BLOOD TEST: CPT

## 2021-11-14 PROCEDURE — 36415 COLL VENOUS BLD VENIPUNCTURE: CPT

## 2021-11-14 PROCEDURE — 51798 US URINE CAPACITY MEASURE: CPT

## 2021-11-14 PROCEDURE — 83605 ASSAY OF LACTIC ACID: CPT | Mod: 91

## 2021-11-14 PROCEDURE — 99233 SBSQ HOSP IP/OBS HIGH 50: CPT | Mod: GC | Performed by: HOSPITALIST

## 2021-11-14 PROCEDURE — 82746 ASSAY OF FOLIC ACID SERUM: CPT

## 2021-11-14 PROCEDURE — 700101 HCHG RX REV CODE 250: Performed by: STUDENT IN AN ORGANIZED HEALTH CARE EDUCATION/TRAINING PROGRAM

## 2021-11-14 PROCEDURE — 700102 HCHG RX REV CODE 250 W/ 637 OVERRIDE(OP): Performed by: STUDENT IN AN ORGANIZED HEALTH CARE EDUCATION/TRAINING PROGRAM

## 2021-11-14 RX ORDER — SODIUM CHLORIDE 450 MG/100ML
INJECTION, SOLUTION INTRAVENOUS CONTINUOUS
Status: DISCONTINUED | OUTPATIENT
Start: 2021-11-14 | End: 2021-11-14 | Stop reason: ALTCHOICE

## 2021-11-14 RX ORDER — LABETALOL HYDROCHLORIDE 5 MG/ML
10 INJECTION, SOLUTION INTRAVENOUS EVERY 4 HOURS PRN
Status: DISCONTINUED | OUTPATIENT
Start: 2021-11-14 | End: 2021-11-16

## 2021-11-14 RX ORDER — DEXTROSE MONOHYDRATE 25 G/50ML
50 INJECTION, SOLUTION INTRAVENOUS
Status: DISCONTINUED | OUTPATIENT
Start: 2021-11-14 | End: 2021-11-30 | Stop reason: HOSPADM

## 2021-11-14 RX ORDER — DEXTROSE MONOHYDRATE 50 MG/ML
INJECTION, SOLUTION INTRAVENOUS CONTINUOUS
Status: DISCONTINUED | OUTPATIENT
Start: 2021-11-14 | End: 2021-11-15

## 2021-11-14 RX ORDER — LORAZEPAM 2 MG/ML
0.25 INJECTION INTRAMUSCULAR ONCE
Status: COMPLETED | OUTPATIENT
Start: 2021-11-14 | End: 2021-11-14

## 2021-11-14 RX ORDER — QUETIAPINE FUMARATE 25 MG/1
25 TABLET, FILM COATED ORAL NIGHTLY PRN
Status: DISCONTINUED | OUTPATIENT
Start: 2021-11-14 | End: 2021-11-20

## 2021-11-14 RX ORDER — INSULIN LISPRO 100 [IU]/ML
1-6 INJECTION, SOLUTION INTRAVENOUS; SUBCUTANEOUS EVERY 6 HOURS
Status: DISCONTINUED | OUTPATIENT
Start: 2021-11-14 | End: 2021-11-20

## 2021-11-14 RX ADMIN — LABETALOL HYDROCHLORIDE 10 MG: 5 INJECTION, SOLUTION INTRAVENOUS at 20:27

## 2021-11-14 RX ADMIN — SODIUM CHLORIDE: 4.5 INJECTION, SOLUTION INTRAVENOUS at 04:53

## 2021-11-14 RX ADMIN — LORAZEPAM 0.5 MG: 2 INJECTION INTRAMUSCULAR; INTRAVENOUS at 05:53

## 2021-11-14 RX ADMIN — THIAMINE HYDROCHLORIDE 100 MG: 100 INJECTION, SOLUTION INTRAMUSCULAR; INTRAVENOUS at 17:34

## 2021-11-14 RX ADMIN — THIAMINE HYDROCHLORIDE 100 MG: 100 INJECTION, SOLUTION INTRAMUSCULAR; INTRAVENOUS at 22:57

## 2021-11-14 RX ADMIN — DEXTROSE MONOHYDRATE: 50 INJECTION, SOLUTION INTRAVENOUS at 14:51

## 2021-11-14 RX ADMIN — LORAZEPAM 0.26 MG: 2 INJECTION INTRAMUSCULAR; INTRAVENOUS at 19:49

## 2021-11-14 ASSESSMENT — LIFESTYLE VARIABLES
VISUAL DISTURBANCES: NOT PRESENT
PAROXYSMAL SWEATS: NO SWEAT VISIBLE
ORIENTATION AND CLOUDING OF SENSORIUM: DISORIENTED FOR PLACE AND / OR PERSON
AGITATION: NORMAL ACTIVITY
HEADACHE, FULLNESS IN HEAD: NOT PRESENT
PAROXYSMAL SWEATS: NO SWEAT VISIBLE
AGITATION: NORMAL ACTIVITY
NAUSEA AND VOMITING: NO NAUSEA AND NO VOMITING
TREMOR: TREMOR NOT VISIBLE BUT CAN BE FELT, FINGERTIP TO FINGERTIP
ORIENTATION AND CLOUDING OF SENSORIUM: ORIENTED AND CAN DO SERIAL ADDITIONS
AUDITORY DISTURBANCES: NOT PRESENT
TOTAL SCORE: 5
HEADACHE, FULLNESS IN HEAD: NOT PRESENT
AUDITORY DISTURBANCES: NOT PRESENT
AUDITORY DISTURBANCES: NOT PRESENT
TOTAL SCORE: 0
HEADACHE, FULLNESS IN HEAD: NOT PRESENT
ORIENTATION AND CLOUDING OF SENSORIUM: DISORIENTED FOR PLACE AND / OR PERSON
NAUSEA AND VOMITING: NO NAUSEA AND NO VOMITING
VISUAL DISTURBANCES: NOT PRESENT
NAUSEA AND VOMITING: NO NAUSEA AND NO VOMITING
TOTAL SCORE: 9
NAUSEA AND VOMITING: NO NAUSEA AND NO VOMITING
PAROXYSMAL SWEATS: NO SWEAT VISIBLE
AUDITORY DISTURBANCES: NOT PRESENT
AGITATION: NORMAL ACTIVITY
HEADACHE, FULLNESS IN HEAD: NOT PRESENT
ANXIETY: NO ANXIETY (AT EASE)
TREMOR: NO TREMOR
TREMOR: NO TREMOR
AGITATION: *
ORIENTATION AND CLOUDING OF SENSORIUM: ORIENTED AND CAN DO SERIAL ADDITIONS
VISUAL DISTURBANCES: NOT PRESENT
ANXIETY: NO ANXIETY (AT EASE)
TOTAL SCORE: 0
TREMOR: *
ANXIETY: NO ANXIETY (AT EASE)
VISUAL DISTURBANCES: NOT PRESENT
ANXIETY: MILDLY ANXIOUS
PAROXYSMAL SWEATS: NO SWEAT VISIBLE

## 2021-11-14 ASSESSMENT — PAIN DESCRIPTION - PAIN TYPE: TYPE: ACUTE PAIN

## 2021-11-14 NOTE — CARE PLAN
The patient is Watcher - Medium risk of patient condition declining or worsening    Shift Goals  Clinical Goals: Safety  Patient Goals: Safety    Progress made toward(s) clinical / shift goals:    Problem: Optimal Care for Alcohol Withdrawal  Goal: Optimal Care for the alcohol withdrawal patient  Outcome: Progressing  Agitation has decreased     Problem: Fall Risk  Goal: Patient will remain free from falls  Outcome: Progressing   Pt not attempting to jump out of bed. Alarm on    Patient is not progressing towards the following goals:      Problem: Knowledge Deficit - Standard  Goal: Patient and family/care givers will demonstrate understanding of plan of care, disease process/condition, diagnostic tests and medications  Outcome: Not Progressing

## 2021-11-14 NOTE — ASSESSMENT & PLAN NOTE
Held due to bleeding risk, CT negative for ICH, will further workup w/ MRI head.   - If negative will consider adding Lovenox as normal renal function

## 2021-11-14 NOTE — PROGRESS NOTES
Daily Progress Note:     Date of Service: 11/14/2021  Primary Team: [facility] IM Blue Team   Attending: M.D.;Kaila Darby  Senior Resident: Dr. Ventura  Intern: Dr. Arreaga  Contact:  [phone]    Chief Complaint:   AMS, Hypernatremia     Interval Hx:  Pt on CIWA protocol 16 on admission, 9 today, was given .5mg Ativan overnight was unresponsive to sternal rub, mild groaning. Hour later saw patient was awake, making inaudible voices. Pt was able to passively move all joints, not noting any pain illicited.   Pt post void scan 404, straight cathed. Pt getting agitated, pulled out IV, started Seroquel 25 PRN.       Consultants/Specialty:    Review of Systems:    Review of Systems   Unable to perform ROS: Mental acuity       Objective Data:   Physical Exam:   Vitals:   Temp:  [36.7 °C (98.1 °F)-37.2 °C (99 °F)] 37.2 °C (99 °F)  Pulse:  [] 75  Resp:  [16-20] 20  BP: (103-172)/(41-91) 153/75  SpO2:  [94 %-100 %] 96 %     Physical Exam  Constitutional:       General: She is sleeping.      Interventions: She is sedated.      Comments: No response to sternal rub on first evaluation.   Following up, pt was awake, unable to make coherent sentence. Unable to follow commands.    HENT:      Mouth/Throat:      Mouth: Mucous membranes are dry.   Eyes:      Extraocular Movements: Extraocular movements intact.   Cardiovascular:      Rate and Rhythm: Normal rate and regular rhythm.      Pulses: Normal pulses.      Heart sounds: Normal heart sounds. No murmur heard.  No friction rub.   Pulmonary:      Effort: Pulmonary effort is normal. No respiratory distress.      Breath sounds: Normal breath sounds. No stridor.   Abdominal:      General: Abdomen is flat. There is no distension.      Palpations: Abdomen is soft.      Tenderness: There is no abdominal tenderness.   Musculoskeletal:         General: No swelling, tenderness, deformity or signs of injury. Normal range of motion.           Labs:   Lab Results   Component Value Date/Time     SODIUM 150 (H) 11/14/2021 02:30 AM    POTASSIUM 3.7 11/14/2021 02:30 AM    CHLORIDE 122 (H) 11/14/2021 02:30 AM    CO2 19 (L) 11/14/2021 02:30 AM    GLUCOSE 93 11/14/2021 02:30 AM    BUN 33 (H) 11/14/2021 02:30 AM    CREATININE 0.61 11/14/2021 02:30 AM    CREATININE 0.7 02/25/2008 06:05 AM    BUNCREATRAT 19 06/20/2016 10:28 AM      Lab Results   Component Value Date/Time    PROTHROMBTM 11.2 02/19/2008 05:30 AM    INR 0.97 02/19/2008 05:30 AM      Lab Results   Component Value Date/Time    WBC 10.2 11/14/2021 02:30 AM    RBC 3.93 (L) 11/14/2021 02:30 AM    HEMOGLOBIN 12.8 11/14/2021 02:30 AM    HEMATOCRIT 40.0 11/14/2021 02:30 AM    .8 (H) 11/14/2021 02:30 AM    MCH 32.6 11/14/2021 02:30 AM    MCHC 32.0 (L) 11/14/2021 02:30 AM    MPV 9.9 11/14/2021 02:30 AM    NEUTSPOLYS 68.50 11/14/2021 02:30 AM    LYMPHOCYTES 21.70 (L) 11/14/2021 02:30 AM    MONOCYTES 8.20 11/14/2021 02:30 AM    EOSINOPHILS 1.20 11/14/2021 02:30 AM    BASOPHILS 0.10 11/14/2021 02:30 AM        Imaging:   DX-CHEST-PORTABLE (1 VIEW)   Final Result      Limited exam showing no acute cardiopulmonary disease.      CT-HEAD W/O   Final Result      1.  Extensive atrophy and white matter changes.   2.  No acute intracranial hemorrhage or territorial infarct.   3.  Small chronic LEFT frontal infarct.      EC-ECHOCARDIOGRAM COMPLETE W/ CONT    (Results Pending)       Problem Representation:     * Encephalopathy- (present on admission)  Assessment & Plan  Hx of Dementia and per PCP note, longterm alcohol use starting at age 8, possible wernicke worsening dementia. Pt found on ground, No signs of fall/trauma. Na, BUN, CPK elevated, normal lactic acid. Dry on PE, possible worsened by Dehydrated state.   CT head- no hematoma, signs of vascular dementia. Will test further w/ MRI head. Will need full body Xray to see if pt has metals.   Will discontinue CIWA due to encephalopathic state.   B12, TSH normal not likely metabolic, ordered Folate.   - CXR and  U/A unremarkable, will obtain Urine Culture.   - Continue high dose Thiamine, Folate, Multivitamin Supplementation.   - 1:1 feed to ensure drinks 2-3L/day  - trend CPK q12 as first 500 until downtrends   - Daily standing weight   I/O daily   - Will speak to POA to gain more information on Baseline.        DVT prophylaxis  Assessment & Plan  Held due to bleeding risk, CT negative for ICH, will further workup w/ MRI head.   - If negative will consider adding Lovenox as normal renal function     Hypovolemia dehydration- (present on admission)  Assessment & Plan  In ED, pt AMS, elevated BUN, Hypernatremc(149-150), Dry  given 1L ns IVF resuscitation in ED , not eating, volume down.   No renal dysfunction,  Pt on continuous 1/2 NS IVF overnight, repeat Na levels decreased 150 to 148, will change to D5W continuous fluids.    - On whick catheter, minimal output, Bladder scan shows 403ml fluid, will repeat scan.     Alcohol withdrawal (HCC)  Assessment & Plan    Longstanding hx of alcohol use. Per pcp note pt states drinking since age of 8.   Pt was placed on CIWA protocol, score of 16 on admission. This morning given    Ativan, became unresponsive.   Discontinued Ativan, will monitor sxs.   Ordered alcohol level to be added to admission blood draw.   - Continue multivitamin, folate, and high dose IV thiamine supplementation.       Debility- (present on admission)  Assessment & Plan  Hx of dementia, long term alcohol use.     Per neighbor worsened mental function in last year.   Will assess after care placement. Currently unlikely pt able to care for self.

## 2021-11-14 NOTE — PROGRESS NOTES
4 Eyes Skin Assessment Completed by GABRIELA Sorto and GABRIELA Hernandez.    Head WDL  Ears WDL  Nose WDL  Mouth WDL, dry  Neck WDL  Breast/Chest WDL  Shoulder Blades Redness  Spine Redness  (R) Arm/Elbow/Hand Redness, Blanching and Bruising  (L) Arm/Elbow/Hand Redness, Blanching and Bruising  Abdomen WDL  Groin Redness  Scrotum/Coccyx/Buttocks Redness and Blanching  (R) Leg Redness, Blanching and Bruising  (L) Leg Redness, Blanching and Bruising  (R) Heel/Foot/Toe Redness and Boggy  (L) Heel/Foot/Toe Redness, Blanching and Boggy, old dti on heel          Devices In Places Tele Box      Interventions In Place Heel Mepilex, Sacral Mepilex, Waffle Overlay, Pillows, Q2 Turns, Barrier Cream and Dri-Carlos Pads, orange top cream on sacrum    Possible Skin Injury Yes    Pictures Uploaded Into Epic Yes  Wound Consult Placed Yes  RN Wound Prevention Protocol Ordered Yes

## 2021-11-14 NOTE — PROGRESS NOTES
MD Quintanilla updated on lethargy, uptrending Na level, and bladder scan results. MD came to bedside to evaluate patient. New orders received

## 2021-11-14 NOTE — ASSESSMENT & PLAN NOTE
Hx of dementia, long term alcohol use.     Per neighbor worsened mental function in last year.   Pending memory care placement

## 2021-11-14 NOTE — ASSESSMENT & PLAN NOTE
Dementia vs vascular dementia, vs delirium (d/t UTI) less likely as not present initially. Hx of baseline dementia, progressively worsening for the past 6 months. Hx of alcohol use disorder. Hyponatremia on admission has been resolved. Elevated BUN and CPK possible d/t dehydration has resolved.   - B12/Folate, TSH WNL  - CT head- no hematoma, signs of vascular dementia. Repeat CT head ordered for stroke work-up was unchanged from prior.   - CXR unremarkable, BC NGTD.  - UTI treated w/ Ceftriaxone - stopped after 3 days.  - Monitor labs  - PT, OT, SLP evaluation  - Patient has been somnolent possible to Seroquel, decreased dose 6.25 mg. Less somnolent after the change to perform mini mental exam.  - Patient more at her baseline. Mini-mental exam result was 6/30, severe dementia.  - Patient became agitated and screaming per nurse and observed in afternoon during mini mental exam.  - Seroquel increased to 12.5 BID.  - Will monitor patient.  - Plan to go to [facility] on 11/30/21

## 2021-11-14 NOTE — THERAPY
Missed Therapy     Patient Name: Viki García  Age:  86 y.o., Sex:  female  Medical Record #: [id number]  Today's Date: 11/14/2021    Attempted PT essence both in AM and PM. Pt too lethargic to participate. Will follow up tomorrow for PT essence Henderson PT, DPT

## 2021-11-14 NOTE — ASSESSMENT & PLAN NOTE
- likely secondary to dehydration. Aggressive fluid resuscitation   - repeat CPK at 6pm, can consider lowering fluid rate if improved

## 2021-11-14 NOTE — ASSESSMENT & PLAN NOTE
- found down by friend   - EKG without ST changes concerning for ischemia   - TTE pending   - FT4 mildly elevated, ?Hyperthyroidism, will get TRAb assay and T3 for further evaluation

## 2021-11-14 NOTE — DISCHARGE PLANNING
Received Choice form at [phone]  Agency/Facility Name: 1 [facility]. [facility]  Referral sent per Choice form @ [phone]

## 2021-11-14 NOTE — ASSESSMENT & PLAN NOTE
Hx of Dementia and per PCP note, longterm alcohol use starting at age 8, possible wernicke worsening dementia. Pt found on ground, No signs of fall/trauma.  Dehydrated on arrival  CT head- no hematoma, signs of vascular dementia  CIWA discontinued  B12, TSH normal   Negative for infection  Daily standing weight   I/O daily     Resolved, at baseline of oriented to self

## 2021-11-14 NOTE — PROGRESS NOTES
Pt transported to new room in 821 via pete with RN Noreen COOK. Pt in room, monitor room notified and can visualize pt. All fall precaution in place, call light with in reach.

## 2021-11-14 NOTE — ASSESSMENT & PLAN NOTE
Longstanding hx of alcohol use. Per pcp note pt states drinking since age of 8.   CIWA discontinued  alcohol level negative.  - Continue multivitamin, folate, and high dose IV thiamine supplementation.

## 2021-11-14 NOTE — ASSESSMENT & PLAN NOTE
- per nursing, patient progressively more agitated, delirium vs early alcohol withdrawal   - drinks 6x 12oz Bud light daily, last drink Thursday night  - will place on CIWA for monitoring, holding benzo for now due concerns for worsening of delirium   - night team to reassess for ativan need  - IVF with N/S at 200mL/hr   - start thiamine and multivitamins

## 2021-11-14 NOTE — PROGRESS NOTES
Monitor Summary  Rhythm: SR/SB  Rate: 57-88  Ectopy: R PAC, R&O PVC  0.16 / 0.08 / 0.4    12 hour cc  COVID-19 surge in effect.

## 2021-11-14 NOTE — ASSESSMENT & PLAN NOTE
In ED, pt AMS, elevated BUN, Hypernatremc(149-150), Dry  given 1L ns IVF resuscitation in ED , not eating, volume down.   No renal dysfunction,  Status post IV fluids  Resolved

## 2021-11-15 LAB
ALBUMIN SERPL BCP-MCNC: 3.4 G/DL (ref 3.2–4.9)
ALBUMIN/GLOB SERPL: 1 G/DL
ALP SERPL-CCNC: 109 U/L (ref 30–99)
ALT SERPL-CCNC: 39 U/L (ref 2–50)
ANION GAP SERPL CALC-SCNC: 11 MMOL/L (ref 7–16)
ANION GAP SERPL CALC-SCNC: 12 MMOL/L (ref 7–16)
AST SERPL-CCNC: 34 U/L (ref 12–45)
BASOPHILS # BLD AUTO: 0.2 % (ref 0–1.8)
BASOPHILS # BLD: 0.02 K/UL (ref 0–0.12)
BILIRUB SERPL-MCNC: 1.2 MG/DL (ref 0.1–1.5)
BUN SERPL-MCNC: 12 MG/DL (ref 8–22)
BUN SERPL-MCNC: 14 MG/DL (ref 8–22)
CALCIUM SERPL-MCNC: 8.9 MG/DL (ref 8.5–10.5)
CALCIUM SERPL-MCNC: 9.2 MG/DL (ref 8.5–10.5)
CHLORIDE SERPL-SCNC: 100 MMOL/L (ref 96–112)
CHLORIDE SERPL-SCNC: 104 MMOL/L (ref 96–112)
CO2 SERPL-SCNC: 22 MMOL/L (ref 20–33)
CO2 SERPL-SCNC: 23 MMOL/L (ref 20–33)
CREAT SERPL-MCNC: 0.42 MG/DL (ref 0.5–1.4)
CREAT SERPL-MCNC: 0.56 MG/DL (ref 0.5–1.4)
EOSINOPHIL # BLD AUTO: 0.32 K/UL (ref 0–0.51)
EOSINOPHIL NFR BLD: 2.7 % (ref 0–6.9)
ERYTHROCYTE [DISTWIDTH] IN BLOOD BY AUTOMATED COUNT: 48 FL (ref 35.9–50)
EST. AVERAGE GLUCOSE BLD GHB EST-MCNC: 128 MG/DL
GLOBULIN SER CALC-MCNC: 3.3 G/DL (ref 1.9–3.5)
GLUCOSE BLD-MCNC: 105 MG/DL (ref 65–99)
GLUCOSE BLD-MCNC: 187 MG/DL (ref 65–99)
GLUCOSE BLD-MCNC: 85 MG/DL (ref 65–99)
GLUCOSE SERPL-MCNC: 122 MG/DL (ref 65–99)
GLUCOSE SERPL-MCNC: 175 MG/DL (ref 65–99)
HBA1C MFR BLD: 6.1 % (ref 4–5.6)
HCT VFR BLD AUTO: 43.6 % (ref 37–47)
HGB BLD-MCNC: 14.3 G/DL (ref 12–16)
IMM GRANULOCYTES # BLD AUTO: 0.05 K/UL (ref 0–0.11)
IMM GRANULOCYTES NFR BLD AUTO: 0.4 % (ref 0–0.9)
LACTATE BLD-SCNC: 2.2 MMOL/L (ref 0.5–2)
LACTATE BLD-SCNC: 2.4 MMOL/L (ref 0.5–2)
LACTATE BLD-SCNC: 2.6 MMOL/L (ref 0.5–2)
LACTATE BLD-SCNC: 2.8 MMOL/L (ref 0.5–2)
LACTATE BLD-SCNC: 3.8 MMOL/L (ref 0.5–2)
LYMPHOCYTES # BLD AUTO: 2.69 K/UL (ref 1–4.8)
LYMPHOCYTES NFR BLD: 22.7 % (ref 22–41)
MAGNESIUM SERPL-MCNC: 1.7 MG/DL (ref 1.5–2.5)
MAGNESIUM SERPL-MCNC: 1.9 MG/DL (ref 1.5–2.5)
MAGNESIUM SERPL-MCNC: 2.7 MG/DL (ref 1.5–2.5)
MCH RBC QN AUTO: 31.8 PG (ref 27–33)
MCHC RBC AUTO-ENTMCNC: 32.8 G/DL (ref 33.6–35)
MCV RBC AUTO: 97.1 FL (ref 81.4–97.8)
MONOCYTES # BLD AUTO: 0.71 K/UL (ref 0–0.85)
MONOCYTES NFR BLD AUTO: 6 % (ref 0–13.4)
NEUTROPHILS # BLD AUTO: 8.06 K/UL (ref 2–7.15)
NEUTROPHILS NFR BLD: 68 % (ref 44–72)
NRBC # BLD AUTO: 0 K/UL
NRBC BLD-RTO: 0 /100 WBC
PLATELET # BLD AUTO: 238 K/UL (ref 164–446)
PMV BLD AUTO: 9.8 FL (ref 9–12.9)
POTASSIUM SERPL-SCNC: 2.6 MMOL/L (ref 3.6–5.5)
POTASSIUM SERPL-SCNC: 3.7 MMOL/L (ref 3.6–5.5)
PROT SERPL-MCNC: 6.7 G/DL (ref 6–8.2)
RBC # BLD AUTO: 4.49 M/UL (ref 4.2–5.4)
SODIUM SERPL-SCNC: 135 MMOL/L (ref 135–145)
SODIUM SERPL-SCNC: 137 MMOL/L (ref 135–145)
WBC # BLD AUTO: 11.9 K/UL (ref 4.8–10.8)

## 2021-11-15 PROCEDURE — 36415 COLL VENOUS BLD VENIPUNCTURE: CPT

## 2021-11-15 PROCEDURE — 85025 COMPLETE CBC W/AUTO DIFF WBC: CPT

## 2021-11-15 PROCEDURE — 87040 BLOOD CULTURE FOR BACTERIA: CPT | Mod: 91

## 2021-11-15 PROCEDURE — 80053 COMPREHEN METABOLIC PANEL: CPT

## 2021-11-15 PROCEDURE — 770020 HCHG ROOM/CARE - TELE (206)

## 2021-11-15 PROCEDURE — 700105 HCHG RX REV CODE 258: Performed by: STUDENT IN AN ORGANIZED HEALTH CARE EDUCATION/TRAINING PROGRAM

## 2021-11-15 PROCEDURE — A9270 NON-COVERED ITEM OR SERVICE: HCPCS

## 2021-11-15 PROCEDURE — 700105 HCHG RX REV CODE 258

## 2021-11-15 PROCEDURE — 82962 GLUCOSE BLOOD TEST: CPT | Mod: 91

## 2021-11-15 PROCEDURE — 700111 HCHG RX REV CODE 636 W/ 250 OVERRIDE (IP)

## 2021-11-15 PROCEDURE — 99232 SBSQ HOSP IP/OBS MODERATE 35: CPT | Mod: GC | Performed by: INTERNAL MEDICINE

## 2021-11-15 PROCEDURE — 97166 OT EVAL MOD COMPLEX 45 MIN: CPT

## 2021-11-15 PROCEDURE — 700111 HCHG RX REV CODE 636 W/ 250 OVERRIDE (IP): Performed by: STUDENT IN AN ORGANIZED HEALTH CARE EDUCATION/TRAINING PROGRAM

## 2021-11-15 PROCEDURE — 92610 EVALUATE SWALLOWING FUNCTION: CPT

## 2021-11-15 PROCEDURE — 80048 BASIC METABOLIC PNL TOTAL CA: CPT

## 2021-11-15 PROCEDURE — 83605 ASSAY OF LACTIC ACID: CPT | Mod: 91

## 2021-11-15 PROCEDURE — 83036 HEMOGLOBIN GLYCOSYLATED A1C: CPT

## 2021-11-15 PROCEDURE — 700102 HCHG RX REV CODE 250 W/ 637 OVERRIDE(OP)

## 2021-11-15 PROCEDURE — 97162 PT EVAL MOD COMPLEX 30 MIN: CPT

## 2021-11-15 PROCEDURE — 83735 ASSAY OF MAGNESIUM: CPT

## 2021-11-15 RX ORDER — SODIUM CHLORIDE, SODIUM LACTATE, POTASSIUM CHLORIDE, CALCIUM CHLORIDE 600; 310; 30; 20 MG/100ML; MG/100ML; MG/100ML; MG/100ML
INJECTION, SOLUTION INTRAVENOUS CONTINUOUS
Status: DISCONTINUED | OUTPATIENT
Start: 2021-11-15 | End: 2021-11-18

## 2021-11-15 RX ORDER — SODIUM CHLORIDE 9 MG/ML
INJECTION, SOLUTION INTRAVENOUS CONTINUOUS
Status: DISCONTINUED | OUTPATIENT
Start: 2021-11-15 | End: 2021-11-15

## 2021-11-15 RX ORDER — POTASSIUM CHLORIDE 7.45 MG/ML
10 INJECTION INTRAVENOUS
Status: COMPLETED | OUTPATIENT
Start: 2021-11-15 | End: 2021-11-15

## 2021-11-15 RX ORDER — SODIUM CHLORIDE 9 MG/ML
INJECTION, SOLUTION INTRAVENOUS CONTINUOUS
Status: DISCONTINUED | OUTPATIENT
Start: 2021-11-15 | End: 2021-11-15 | Stop reason: CLARIF

## 2021-11-15 RX ORDER — SODIUM CHLORIDE AND POTASSIUM CHLORIDE 150; 900 MG/100ML; MG/100ML
INJECTION, SOLUTION INTRAVENOUS CONTINUOUS
Status: DISCONTINUED | OUTPATIENT
Start: 2021-11-15 | End: 2021-11-15

## 2021-11-15 RX ORDER — POTASSIUM CHLORIDE 20 MEQ/1
40 TABLET, EXTENDED RELEASE ORAL ONCE
Status: DISCONTINUED | OUTPATIENT
Start: 2021-11-15 | End: 2021-11-15 | Stop reason: DRUGHIGH

## 2021-11-15 RX ADMIN — INSULIN LISPRO 1 UNITS: 100 INJECTION, SOLUTION INTRAVENOUS; SUBCUTANEOUS at 00:33

## 2021-11-15 RX ADMIN — THIAMINE HYDROCHLORIDE 100 MG: 100 INJECTION, SOLUTION INTRAMUSCULAR; INTRAVENOUS at 11:19

## 2021-11-15 RX ADMIN — SODIUM CHLORIDE, POTASSIUM CHLORIDE, SODIUM LACTATE AND CALCIUM CHLORIDE: 600; 310; 30; 20 INJECTION, SOLUTION INTRAVENOUS at 14:29

## 2021-11-15 RX ADMIN — POTASSIUM CHLORIDE 10 MEQ: 10 INJECTION, SOLUTION INTRAVENOUS at 04:52

## 2021-11-15 RX ADMIN — SODIUM CHLORIDE: 9 INJECTION, SOLUTION INTRAVENOUS at 02:52

## 2021-11-15 RX ADMIN — THIAMINE HYDROCHLORIDE 100 MG: 100 INJECTION, SOLUTION INTRAMUSCULAR; INTRAVENOUS at 19:33

## 2021-11-15 RX ADMIN — POTASSIUM CHLORIDE 10 MEQ: 10 INJECTION, SOLUTION INTRAVENOUS at 05:55

## 2021-11-15 RX ADMIN — POTASSIUM CHLORIDE 10 MEQ: 10 INJECTION, SOLUTION INTRAVENOUS at 03:49

## 2021-11-15 RX ADMIN — MAGNESIUM SULFATE HEPTAHYDRATE 3 G: 500 INJECTION, SOLUTION INTRAMUSCULAR; INTRAVENOUS at 04:06

## 2021-11-15 RX ADMIN — QUETIAPINE FUMARATE 25 MG: 25 TABLET ORAL at 16:56

## 2021-11-15 RX ADMIN — SODIUM CHLORIDE: 9 INJECTION, SOLUTION INTRAVENOUS at 11:19

## 2021-11-15 RX ADMIN — THIAMINE HYDROCHLORIDE 100 MG: 100 INJECTION, SOLUTION INTRAMUSCULAR; INTRAVENOUS at 10:16

## 2021-11-15 RX ADMIN — POTASSIUM CHLORIDE 10 MEQ: 10 INJECTION, SOLUTION INTRAVENOUS at 02:42

## 2021-11-15 RX ADMIN — THIAMINE HYDROCHLORIDE 100 MG: 100 INJECTION, SOLUTION INTRAMUSCULAR; INTRAVENOUS at 14:29

## 2021-11-15 ASSESSMENT — COGNITIVE AND FUNCTIONAL STATUS - GENERAL
HELP NEEDED FOR BATHING: A LOT
MOVING TO AND FROM BED TO CHAIR: UNABLE
SUGGESTED CMS G CODE MODIFIER DAILY ACTIVITY: CL
STANDING UP FROM CHAIR USING ARMS: TOTAL
DRESSING REGULAR UPPER BODY CLOTHING: TOTAL
EATING MEALS: A LOT
MOVING FROM LYING ON BACK TO SITTING ON SIDE OF FLAT BED: UNABLE
TURNING FROM BACK TO SIDE WHILE IN FLAT BAD: UNABLE
DRESSING REGULAR LOWER BODY CLOTHING: TOTAL
CLIMB 3 TO 5 STEPS WITH RAILING: TOTAL
MOBILITY SCORE: 6
TOILETING: TOTAL
DAILY ACTIVITIY SCORE: 9
SUGGESTED CMS G CODE MODIFIER MOBILITY: CN
WALKING IN HOSPITAL ROOM: TOTAL
PERSONAL GROOMING: A LOT

## 2021-11-15 ASSESSMENT — ACTIVITIES OF DAILY LIVING (ADL): TOILETING: UNABLE TO DETERMINE AT THIS TIME

## 2021-11-15 ASSESSMENT — PAIN DESCRIPTION - PAIN TYPE: TYPE: ACUTE PAIN

## 2021-11-15 ASSESSMENT — FIBROSIS 4 INDEX: FIB4 SCORE: 1.97

## 2021-11-15 ASSESSMENT — GAIT ASSESSMENTS: GAIT LEVEL OF ASSIST: UNABLE TO PARTICIPATE

## 2021-11-15 NOTE — DISCHARGE PLANNING
Received Choice form at [id number]  Agency/Facility Name: [facility]   Referral sent per Choice form @ [phone]

## 2021-11-15 NOTE — DIETARY
"Nutrition services: Day 2 of admit.  Viki García is a 86 y.o. female with admitting DX of Dehydration.  Consult received for nutrition risk via RD judgment during screening.      Assessment:  Height: 160 cm (5' 3\")  Weight: 49.1 kg (108 lb 3.9 oz) via bed scale  Body mass index is 19.17 kg/m²., BMI classification: Normal Weight  Diet/Intake: NPO Restrict to: Sips with Medications    Evaluation:   1. Per MD Power of  (neighbor) reported that she has had poor PO intake. Tried contacting POA to inquire about PO intake and weight history but unable to reach.  2. Pt diagnosed with encephalopathy, per MD note POA has reported she has become more confused over the past 3 months. Pt lives alone, POA and her  bring her groceries. Per MD note Pt has undiagnosed dementia. Pt currently in alcohol withdrawal and has dehydration.  3. Pt temples showed severe muscle loss with scooping. Unable to do full nutrition focused physical exam due to Pt sleeping and attempts to waken unsuccessful.   4. Per SLP note Pt \"is not safe for oral diet\", \"recommend NPO with consideration for non-oral nutrition if within Pt/family wishes\". Unable to access TF wishes via advanced directive.  5. Meds: Pericolace, Lactated Ringers, NS infusion, therapeutic multivitamin-minerals, thiamine, SSI.  6. Wounds: Wound noted on 11/13 Wound team not following.  7. BM: Last BM PTA, Pericolace on MAR    Malnutrition Risk: Unable to access but Pt is at risk due to reported Poor PO intake and signs of severe muscle wasting.    Recommendations/Plan:  1. Advance diet when medically feasible or recommend TF if within Pt/family wishes.  2. Monitor weight.    RD following POC/GOC.        "

## 2021-11-15 NOTE — PROGRESS NOTES
Began care at 1845, Report received from Alonzo OCHOA. Patient in SR on the monitor. Pt agitated, restless, attempting to exit the bed. Unable to follow commands and speech incomprehensible. Initial assessment completed, orders reviewed, call light within reach, and fall precautions are in place.

## 2021-11-15 NOTE — PROGRESS NOTES
Paged [facility] team concerning patients presentation, agitation, confusion, slurred speech and inability to form words, face is symmetrical, patient is unable to follow commands and participate in NIH screening, pt able to move all extremities but without purposeful movements. Discussed plan of care to get MRI; unable to complete screening for as patient is nonverbal, will require diagnostic X-ray. Per discussion, patients baseline mental acuity is unknown, will proceed with planned X-ray and MRI tomorrow. Pt was somnolent to obtunded went given .50mg of Ativan, .26mg Ativan ordered to help patient with agitation and fearfulness.

## 2021-11-15 NOTE — CARE PLAN
Problem: Nutritional:  Goal: Achieve adequate nutritional intake  Description: Advance diet when medically feasible. Patient will consume >50% of meals  Outcome: Not Met    See RD note.

## 2021-11-15 NOTE — THERAPY
Speech Language Pathology   Clinical Swallow Evaluation     Patient Name: Viki García  AGE:  86 y.o., SEX:  female  Medical Record #: [id number]  Today's Date: 11/15/2021     Precautions  Precautions: (P) Fall Risk,Swallow Precautions ( See Comments)    Assessment    Patient is 86 y.o. female with a diagnosis of ETOH withdrawal on CIWA protocol.     Patient participated in clinical swallow evaluation this date.     Pertinent Information    Respiratory status: Normal- room air   PO trials: ice chips, thin liquids, mildly thick liquids and liquidized  Behavioral observations: Disinterested and Confused   Drooling/excess secretions: Coated in mucous, easily cleared with oral care    Oral Mechanism Exam:    Patient with symmetrical facial features. Patient unable to follow directives to complete a formal oral mechanism exam, however informal observations appreciated adequate strength and ROM of articulators. Vocal quality slurred and of decreased intensity. Oral cavity and dentition noted to be coated in mucous. Student nurse completed thorough oral care.     Oral/Pharyngeal phase:    Patient with adequate oral acceptance and containment. Presumed timely AP transit and timely swallow trigger. Patient with persistent throat clearing following 100% of trials presented to the patient. The patient admitted to [facility] and only consumed 1 oz mildly thick liquids and 3 bites of liquidized textures before declining additional textures.     Clinical Impressions:    Clinical signs of pharyngeal dysphagia, likely acute related to ETOH withdrawal. Swallow prognosis is excellent. Instrumental swallow study is not indicated. Given high risk for aspiration PNA r/t fluctuating mentation and s/sx of aspiration at bedside patient is not safe for oral diet.    Recommendations:    Recommend NPO with consideration for non-oral nutrition if within patient/family wishes. 10 single ice chips per hour OK to reduce xerostomia and to prevent disuse  "atrophy.     Plan    Recommend Speech Therapy 3 times per week until therapy goals are met for the following treatments:  Dysphagia Training.    Discharge Recommendations: Recommend post-acute placement for additional speech therapy services prior to discharge home    Objective       11/15/21 1106   Oral Motor Eval    Is Patient Able to Complete Oral Motor Eval Yes, Within Normal Limits   Laryngeal Function   Voice Quality Minimal   Excursion Upon Swallow Complete   Oral Food Presentation   Ice Chips Within Functional Limits   Single Swallow Mildly Thick (2) - (Nectar Thick)  Moderate   Single Swallow Thin (0) Moderate   Liquidised (3) Moderate   Tracheostomy   Tracheostomy  No   Dysphagia Strategies / Recommendations   Strategies / Interventions Recommended (Yes / No) Yes   Compensatory Strategies To Be Assessed   Diet / Liquid Recommendation NPO;Pre-Feeding Trials with SLP Only   Medication Administration  Crush all Medications in Puree;Float Whole with Puree   Therapy Interventions Dysphagia Therapy By Speech Language Pathologist   Dysphagia Rating   Nutritional Liquid Intake Rating Scale Nothing by mouth   Nutritional Food Intake Rating Scale Nothing by mouth   Patient / Family Goals   Patient / Family Goal #1 \"Thats all for now thank you\"   Short Term Goals   Short Term Goal # 1 The patient will consume prefeeding trials with no overt s/sx of aspiration          "

## 2021-11-15 NOTE — THERAPY
Physical Therapy   Initial Evaluation     Patient Name: Viki García  Age:  86 y.o., Sex:  female  Medical Record #: [id number]  Today's Date: 11/15/2021     Precautions  Precautions: Fall Risk;Swallow Precautions ( See Comments)  Comments: confused    Assessment  Patient is 86 y.o. female admitted after being found down with AMS and hypernatremia. Pt on CIWA protocol. Attempted evaluation yesterday but pt was obtunded. Increased level of arousal today but only oriented to self, limited initiation, poor motor control, and poor command following. Pt required max assist for bed mobility and STS. Unclear prior level of function but assume independent as she was living alone per chart. PT will cont while in acute care to address strength, balance, sequencing, safety, and activity tolerance.     Plan    Recommend Physical Therapy 3 times per week until therapy goals are met for the following treatments:  Bed Mobility, Gait Training, Neuro Re-Education / Balance, Self Care/Home Evaluation, Therapeutic Activities and Therapeutic Exercises    DC Equipment Recommendations: Unable to determine at this time  Discharge Recommendations: Recommend post-acute placement for additional physical therapy services prior to discharge home        11/15/21 0906   Prior Living Situation   Prior Services Home-Independent   Housing / Facility [facility] [place]   Lives with - Patient's Self Care Capacity Alone and Unable to Care For Self   Comments information from chart, neighbor found pt down in home   Prior Level of Functional Mobility   Bed Mobility Unable To Determine At This Time   Comments suspect independent as she lived alone   History of Falls   History of Falls Yes   Cognition    Cognition / Consciousness X   Speech/ Communication Delayed Responses   Orientation Level Not Oriented to Reason;Not Oriented to Place;Not Oriented to Year   Level of Consciousness Responds to voice   Ability To Follow Commands 1 Step   Safety Awareness  Impaired;Impulsive   New Learning Impaired   Attention Impaired   Sequencing Impaired   Initiation Impaired   Comments in restraints, mumbling throughout   Active ROM Lower Body    Active ROM Lower Body  X   Comments limited by weakness   Strength Lower Body   Lower Body Strength  X   Gross Strength Generalized Weakness, Equal Bilaterally   Sensation Lower Body   Lower Extremity Sensation   Not Tested   Comments unable to assess due to cog   Neurological Concerns   Neurological Concerns Yes   Sitting Posture During ADL's Posterior Lean   Standing Posture During ADL's Posterior Lean   Coordination Lower Body    Coordination Lower Body  Not Tested   Balance Assessment   Sitting Balance (Static) Poor -   Sitting Balance (Dynamic) Trace +   Standing Balance (Static) Trace   Standing Balance (Dynamic) Trace   Weight Shift Sitting Poor   Weight Shift Standing Absent   Comments 2 assist with standing   Gait Analysis   Gait Level Of Assist Unable to Participate   Bed Mobility    Supine to Sit Maximal Assist   Sit to Supine Maximal Assist   Scooting Maximal Assist   Functional Mobility   Sit to Stand Maximal Assist   Bed, Chair, Wheelchair Transfer Unable to Participate   Toilet Transfers Unable to Participate   Mobility EOB, STS   Short Term Goals    Short Term Goal # 1 pt will be able to complete supine<>sitting with SPV in 6tx in order to return home   Short Term Goal # 2 pt will be able to complete functional transfers with min assist in 6tx in order to progress back to prior level   Short Term Goal # 3 pt will be able to ambulate 25ft with FWW and min assist in 6tx in order to progress home   Anticipated Discharge Equipment and Recommendations   DC Equipment Recommendations Unable to determine at this time   Discharge Recommendations Recommend post-acute placement for additional physical therapy services prior to discharge home

## 2021-11-15 NOTE — PROGRESS NOTES
Daily Progress Note:     Date of Service: 11/15/2021  Primary Team: [facility] Team   Attending: Dr. Dooley.   Senior Resident: Dr. Nolen   Intern: Dr. Arreaga   Contact:  [phone]    Chief Complaint:   AMS, Hypernatremia     ID: Patient is an 86-year-old female with a significant history of baseline dementia(A&O x1), who was admitted on 11/12 for altered mental status.    Overnight: Blood pressures were elevated at 200/90 and received a dose of IV labetalol.  No other acute events    Subjective:  -Poor historian.  Mostly somnolent.  Oriented to self.  Not following commands.  Denies any pain  -Per nursing staff, she was alert and oriented times 1 in the morning and was able to take crushed medications with applesauce with no issue    Interval history:  11/16: Midline placed, attempted to call DPOA x2, went to . Will try again later today    Around 4:30 PM, nurse paged about HR in 140s. Patient ia alert, verbal but unable to understand. Ordered STAT EKG, showed Afib with RVR at . Gave her IV metoprolol 5 mg x1, HR went down to 115, SBP at 120s.     Called and spoke with DPOA, she confirmed DNR/DNI, no ICU transfers. Prior to this admission, she was living alone, taking care of her self, A&Ox 1-2, can not engage in conversation, poorly eating. Not on any home medications. The neighbor helps with shopping, groceries, lawn care. DPOA is agreeable to come and meet the team on Thursday at 1 PM.    Consultants/Specialty:  SLP  PT  OT    Review of Systems:    Review of Systems   Unable to perform ROS: Mental acuity       Objective Data:   Physical Exam:   Vitals:   Temp:  [36.1 °C (97 °F)-36.9 °C (98.4 °F)] 36.9 °C (98.4 °F)  Pulse:  [49-93] 76  Resp:  [16-20] 20  BP: (119-193)/() 153/52  SpO2:  [95 %-99 %] 98 %     PHYSICAL EXAM: Limited because of AMS  Physical Exam  Constitutional:       General: She is sleeping. She is not in acute distress.     Appearance: She is ill-appearing.       Interventions: She is sedated.      Comments: Mostly somnolent, oriented to self  Not able to follow commands   HENT:      Head: Normocephalic and atraumatic.      Nose: Nose normal.      Mouth/Throat:      Mouth: Mucous membranes are moist.   Eyes:      Conjunctiva/sclera: Conjunctivae normal.      Pupils: Pupils are equal, round, and reactive to light.   Cardiovascular:      Rate and Rhythm: Normal rate and regular rhythm.      Pulses: Normal pulses.      Heart sounds: Normal heart sounds.   Pulmonary:      Effort: Pulmonary effort is normal. No respiratory distress.      Breath sounds: Normal breath sounds.   Abdominal:      General: Bowel sounds are normal.      Palpations: Abdomen is soft. There is no mass.      Tenderness: There is no abdominal tenderness.   Musculoskeletal:         General: No swelling or tenderness.   Skin:     General: Skin is warm.      Capillary Refill: Capillary refill takes less than 2 seconds.   Neurological:      Comments: Unable to perform complete neurological exam because of AMS   Psychiatric:      Comments: Unable to assess       Labs:   Lab Results   Component Value Date/Time    SODIUM 135 11/15/2021 12:57 AM    POTASSIUM 2.6 (LL) 11/15/2021 12:57 AM    CHLORIDE 100 11/15/2021 12:57 AM    CO2 23 11/15/2021 12:57 AM    GLUCOSE 175 (H) 11/15/2021 12:57 AM    BUN 14 11/15/2021 12:57 AM    CREATININE 0.56 11/15/2021 12:57 AM    CREATININE 0.7 02/25/2008 06:05 AM    BUNCREATRAT 19 06/20/2016 10:28 AM      Lab Results   Component Value Date/Time    PROTHROMBTM 11.2 02/19/2008 05:30 AM    INR 0.97 02/19/2008 05:30 AM      Lab Results   Component Value Date/Time    WBC 11.9 (H) 11/15/2021 12:57 AM    RBC 4.49 11/15/2021 12:57 AM    HEMOGLOBIN 14.3 11/15/2021 12:57 AM    HEMATOCRIT 43.6 11/15/2021 12:57 AM    MCV 97.1 11/15/2021 12:57 AM    MCH 31.8 11/15/2021 12:57 AM    MCHC 32.8 (L) 11/15/2021 12:57 AM    MPV 9.8 11/15/2021 12:57 AM    NEUTSPOLYS 68.00 11/15/2021 12:57 AM     LYMPHOCYTES 22.70 11/15/2021 12:57 AM    MONOCYTES 6.00 11/15/2021 12:57 AM    EOSINOPHILS 2.70 11/15/2021 12:57 AM    BASOPHILS 0.20 11/15/2021 12:57 AM        Imaging:   EC-ECHOCARDIOGRAM COMPLETE W/O CONT   Final Result      DX-CHEST-PORTABLE (1 VIEW)   Final Result      Limited exam showing no acute cardiopulmonary disease.      CT-HEAD W/O   Final Result      1.  Extensive atrophy and white matter changes.   2.  No acute intracranial hemorrhage or territorial infarct.   3.  Small chronic LEFT frontal infarct.          Problem Representation:   # Acute Encephalopathy  -H/o baseline Dementia worsening for the past 6 months  -Longterm alcohol use with possible wernicke's encephalopathy   -Hypernatemia on admission  -BUN, CPK elevated on exam, possible dehydration and uremia worsening the dementia  -CT head- no hematoma, signs of vascular dementia  -B12/Folate, TSH WNL  -CXR and U/A unremarkable, BC - pending  -Monitor labs  -PT, OT, SLP evaluation  -Continue high dose Thiamine, Folate, Multivitamin Supplementation  -Continue IVF for now  -On soft restraints      # New onset Atrial fibrillation with RVR  -IV metoprolol PRN  -Started on Metoprolol 25 mg BID, will titrate as needed  -Will discuss about anti-coagulation with DPOA    #Hypertensive urgency with h/o Hypertension  -Held amlodipine 10mg OD as she was started on metoprolol  -PRN labetolol and enalapril with parameters    # Hypernatremia  -Resolved    # Hypokalemia  -Monitor and replete PRN    # Pre-diabetes  -HbA1C 6.1 in 11/2021  -On ISS, accucheks for hyperglycemia with hypoglycemia protocol    # Leukocytosis  -11/16: WBC at 14.8, on admission at 16.7  -Likely reactive    #Lactic acidosis  -Resolved  -Possible dehydration    # Debility  # Severe protein calorie malnutrition  -Dietary consult  -Continue thiamine and MVT  -Discuss with DPOA on tube feeds    # Chronic alcohol use  -No signs of withdrawal      CODE: DNR/DNI  DVT Prophylaxis: Lovenox  DISPO:  AMS, not medically stable, poor prognosis      Sridevi Nolen M.D.

## 2021-11-15 NOTE — PROGRESS NOTES
Salina from lab called with critical lab of Potassium 2.6. Notified MD Blount of K 2.6 and lactic acid increase, new orders received.

## 2021-11-15 NOTE — PROGRESS NOTES
Pt bladder scan of 326mL, third episode of retention. Order to straight cath, if retention persists and bladder scan is >400, place lamb.

## 2021-11-15 NOTE — DISCHARGE PLANNING
Anticipated Discharge Disposition: SNF     Action: RNCM placed call to DPOA Maegan [phone] the patient's neighbor to discuss DC POC. Per Maegan patient lives alone and has been declining due to recent falls. Maegan shared that she has continued to consume alcohol, not eating and unable to care for herself. Per MILA the patient is set to go to [place], [place] after she completes a SNF stay. Per Maegan [facility] will go to the SNF to evaluate for admission and will transport the patient from SNF to [facility] at discharge. Patient has no family. CM discussed with Maegan that patient was declined at the two facilities that she chose and is in agreement with sending referral to all facilities in [facility].            Barriers to Discharge: SNF acceptance and bed availability, behaviors      Plan: HCM to follow for bed availability

## 2021-11-15 NOTE — THERAPY
Occupational Therapy   Initial Evaluation     Patient Name: Viki García  Age:  86 y.o., Sex:  female  Medical Record #: [id number]  Today's Date: 11/15/2021     Precautions  Precautions: Fall Risk,Swallow Precautions ( See Comments)  Comments: confused    Assessment  Patient is 86 y.o. female admitted after found down by neighbor found to have encephalopathy, dehydration, and ETOH withdrawal. PMHx of dementia, HTN, and ETOH abuse. Poor command following this session; req max A for sitting balance d/t heavy posterior lean. Unclear if has anyone who can assist at d/c; neighbor is a friend, but unclear if can assist. Currently limited by decreased functional mobility, activity tolerance, cognition/command following, confusion, strength, AROM, coordination, balance, and pain which are currently affecting pt's ability to complete ADLs/IADLs at baseline. Will continue to follow.     Plan    Recommend Occupational Therapy 3 times per week until therapy goals are met for the following treatments:  Adaptive Equipment, Neuro Re-Education / Balance, Self Care/Activities of Daily Living, Therapeutic Activities and Therapeutic Exercises.    DC Equipment Recommendations: Unable to determine at this time  Discharge Recommendations: Recommend post-acute placement for additional occupational therapy services prior to discharge home      Objective     11/15/21 0857   Prior Living Situation   Prior Services Unable To Determine At This Time   Housing / Facility Unable To Determine At This Time   Equipment Owned Unable to Determine At This Time   Lives with - Patient's Self Care Capacity Alone and Unable to Care For Self   Comments pt is poor historian and unable to provide PLOF and home setup; found down by neighbor. Recommend clarification from family/friends   Prior Level of ADL Function   Self Feeding Unable To Determine At This Time   Grooming / Hygiene Unable To Determine At This Time   Bathing Unable To Determine At This Time    Dressing Unable To Determine At This Time   Toileting Unable To Determine At This Time   Comments pt lives alone, so likely able.   Prior Level of IADL Function   Medication Management Unable To Determine At This Time   Laundry Unable To Determine At This Time   Kitchen Mobility Unable To Determine At This Time   Finances Unable To Determine At This Time   Home Management Unable To Determine At This Time   Shopping Unable To Determine At This Time   Prior Level Of Mobility Unable to Determine At This Time   History of Falls   History of Falls Yes   Date of Last Fall   (found down)   Precautions   Precautions Fall Risk;Swallow Precautions ( See Comments)   Comments ETOH abuse; confused   Cognition    Cognition / Consciousness X   Speech/ Communication Delayed Responses  (minimal verablization and mumbling; difficult ot understand)   Orientation Level Not Oriented to Place;Not Oriented to Reason   Level of Consciousness Alert   Safety Awareness Impaired   New Learning Impaired   Attention Impaired   Sequencing Impaired   Initiation Impaired   Passive ROM Upper Body   Passive ROM Upper Body WDL   Active ROM Upper Body   Comments unable to fully test d/t poor command following   Strength Upper Body   Comments unable to fully test d/t poor command following   Sensation Upper Body   Comments unable to fully test d/t poor command following   Neurological Concerns   Sitting Posture During ADL's Posterior Lean   Standing Posture During ADL's Posterior Lean   Balance Assessment   Sitting Balance (Static) Poor -   Sitting Balance (Dynamic) Trace +   Standing Balance (Static) Trace   Standing Balance (Dynamic) Trace   Weight Shift Sitting Poor   Weight Shift Standing Absent   Comments max x2ppl    Bed Mobility    Supine to Sit Maximal Assist   Sit to Supine Maximal Assist   Scooting Maximal Assist   Comments HOB elevated   ADL Assessment   Lower Body Dressing Total Assist   Toileting Total Assist   Comments unable to fully  test d/t poor command following   How much help from another person does the patient currently need...   Putting on and taking off regular lower body clothing? 1   Bathing (including washing, rinsing, and drying)? 2   Toileting, which includes using a toilet, bedpan, or urinal? 1   Putting on and taking off regular upper body clothing? 1   Taking care of personal grooming such as brushing teeth? 2   Eating meals? 2   6 Clicks Daily Activity Score 9   Functional Mobility   Sit to Stand Maximal Assist  (x2)   Bed, Chair, Wheelchair Transfer Unable to Participate   Toilet Transfers Unable to Participate   Mobility EOB and STSx1   Edema / Skin Assessment   Edema / Skin  Not Assessed   Activity Tolerance   Comments limited by fatigue and cognition   Patient / Family Goals   Patient / Family Goal #1 unable to state   Short Term Goals   Short Term Goal # 1 seated g/h with min A   Short Term Goal # 2 sit unsupported at EOB for >3 min in prep for LB dressing   Short Term Goal # 3 BSC txf with mod A   Education Group   Education Provided Role of Occupational Therapist   Role of Occupational Therapist Patient Response Patient;Nonacceptance;Explanation;No Learning Evidence;Reinforcement Needed

## 2021-11-15 NOTE — CARE PLAN
The patient is Watcher - Medium risk of patient condition declining or worsening    Shift Goals  Clinical Goals: Monitor Mental Status  Patient Goals: NA  Family Goals: NA    Progress made toward(s) clinical / shift goals:  none    Patient is not progressing towards the following goals:  Problem: Knowledge Deficit - Standard  Goal: Patient and family/care givers will demonstrate understanding of plan of care, disease process/condition, diagnostic tests and medications  Outcome: Not Progressing     Problem: Optimal Care for Alcohol Withdrawal  Goal: Optimal Care for the alcohol withdrawal patient  Outcome: Not Progressing     Problem: Seizure Precautions  Goal: Implementation of seizure precautions  Outcome: Not Progressing     Problem: Lifestyle Changes  Goal: Patient's ability to identify lifestyle changes and available resources to help reduce recurrence of condition will improve  Outcome: Not Progressing     Problem: Psychosocial  Goal: Patient's level of anxiety will decrease  Outcome: Not Progressing  Goal: Spiritual and cultural needs incorporated into hospitalization  Outcome: Not Progressing     Problem: Risk for Aspiration  Goal: Patient's risk for aspiration will be absent or decrease  Outcome: Not Progressing     Problem: Skin Integrity  Goal: Skin integrity is maintained or improved  Outcome: Not Progressing     Problem: Fall Risk  Goal: Patient will remain free from falls  Outcome: Not Progressing     Problem: Safety - Medical Restraint  Goal: Remains free of injury from restraints (Restraint for Interference with Medical Device)  Outcome: Not Progressing  Goal: Free from restraint(s) (Restraint for Interference with Medical Device)  Outcome: Not Progressing     Problem: Pain - Standard  Goal: Alleviation of pain or a reduction in pain to the patient’s comfort goal  Outcome: Not Progressing

## 2021-11-15 NOTE — CARE PLAN
The patient is Watcher - Medium risk of patient condition declining or worsening    Shift Goals  Clinical Goals: Safety  Patient Goals: Safety    Progress made toward(s) clinical / shift goals:    Problem: Knowledge Deficit - Standard  Goal: Patient and family/care givers will demonstrate understanding of plan of care, disease process/condition, diagnostic tests and medications  Outcome: Progressing     Problem: Seizure Precautions  Goal: Implementation of seizure precautions  Outcome: Progressing       Patient is not progressing towards the following goals:      Problem: Optimal Care for Alcohol Withdrawal  Goal: Optimal Care for the alcohol withdrawal patient  Outcome: Not Progressing   Pt unable to tolerate CIWA

## 2021-11-15 NOTE — ASSESSMENT & PLAN NOTE
Hx of HTN on Amlodipine 10mg   BP has been within range, held medications for now.   Overnight 11/14 pt hypertensive, added PRN labetalol.

## 2021-11-15 NOTE — PROGRESS NOTES
Monitor Summary  Rhythm: SB/SR/ST  Rate:   Ectopy: R PVC  0.17 / 0.08 / 0.41    12 hour cc  COVID-19 surge in effect.

## 2021-11-15 NOTE — PROGRESS NOTES
Bladder scan showed 403 on post void residual. MD updated; no lamb to be placed at this time, straight cath and recheck bladder scan.

## 2021-11-16 ENCOUNTER — APPOINTMENT (OUTPATIENT)
Dept: RADIOLOGY | Facility: MEDICAL CENTER | Age: 86
DRG: 057 | End: 2021-11-16
Attending: STUDENT IN AN ORGANIZED HEALTH CARE EDUCATION/TRAINING PROGRAM
Payer: MEDICARE

## 2021-11-16 LAB
ALBUMIN SERPL BCP-MCNC: 3.3 G/DL (ref 3.2–4.9)
ALBUMIN/GLOB SERPL: 1 G/DL
ALP SERPL-CCNC: 107 U/L (ref 30–99)
ALT SERPL-CCNC: 32 U/L (ref 2–50)
AMMONIA PLAS-SCNC: 10 UMOL/L (ref 11–45)
ANION GAP SERPL CALC-SCNC: 12 MMOL/L (ref 7–16)
AST SERPL-CCNC: 25 U/L (ref 12–45)
BASOPHILS # BLD AUTO: 0.1 % (ref 0–1.8)
BASOPHILS # BLD: 0.02 K/UL (ref 0–0.12)
BILIRUB SERPL-MCNC: 1.6 MG/DL (ref 0.1–1.5)
BUN SERPL-MCNC: 9 MG/DL (ref 8–22)
CALCIUM SERPL-MCNC: 9.2 MG/DL (ref 8.5–10.5)
CHLORIDE SERPL-SCNC: 102 MMOL/L (ref 96–112)
CO2 SERPL-SCNC: 25 MMOL/L (ref 20–33)
CREAT SERPL-MCNC: 0.48 MG/DL (ref 0.5–1.4)
EOSINOPHIL # BLD AUTO: 0.21 K/UL (ref 0–0.51)
EOSINOPHIL NFR BLD: 1.4 % (ref 0–6.9)
ERYTHROCYTE [DISTWIDTH] IN BLOOD BY AUTOMATED COUNT: 45.8 FL (ref 35.9–50)
GLOBULIN SER CALC-MCNC: 3.2 G/DL (ref 1.9–3.5)
GLUCOSE BLD-MCNC: 104 MG/DL (ref 65–99)
GLUCOSE BLD-MCNC: 107 MG/DL (ref 65–99)
GLUCOSE BLD-MCNC: 154 MG/DL (ref 65–99)
GLUCOSE BLD-MCNC: 98 MG/DL (ref 65–99)
GLUCOSE SERPL-MCNC: 128 MG/DL (ref 65–99)
HCT VFR BLD AUTO: 44.1 % (ref 37–47)
HGB BLD-MCNC: 15.1 G/DL (ref 12–16)
IMM GRANULOCYTES # BLD AUTO: 0.04 K/UL (ref 0–0.11)
IMM GRANULOCYTES NFR BLD AUTO: 0.3 % (ref 0–0.9)
LACTATE BLD-SCNC: 1.2 MMOL/L (ref 0.5–2)
LACTATE BLD-SCNC: 2.1 MMOL/L (ref 0.5–2)
LYMPHOCYTES # BLD AUTO: 1.69 K/UL (ref 1–4.8)
LYMPHOCYTES NFR BLD: 11.4 % (ref 22–41)
MCH RBC QN AUTO: 32.5 PG (ref 27–33)
MCHC RBC AUTO-ENTMCNC: 34.2 G/DL (ref 33.6–35)
MCV RBC AUTO: 94.8 FL (ref 81.4–97.8)
MONOCYTES # BLD AUTO: 0.69 K/UL (ref 0–0.85)
MONOCYTES NFR BLD AUTO: 4.7 % (ref 0–13.4)
NEUTROPHILS # BLD AUTO: 12.13 K/UL (ref 2–7.15)
NEUTROPHILS NFR BLD: 82.1 % (ref 44–72)
NRBC # BLD AUTO: 0 K/UL
NRBC BLD-RTO: 0 /100 WBC
OSMOLALITY SERPL: 282 MOSM/KG H2O (ref 278–298)
PLATELET # BLD AUTO: 282 K/UL (ref 164–446)
PMV BLD AUTO: 9.6 FL (ref 9–12.9)
POTASSIUM SERPL-SCNC: 3.4 MMOL/L (ref 3.6–5.5)
PROT SERPL-MCNC: 6.5 G/DL (ref 6–8.2)
RBC # BLD AUTO: 4.65 M/UL (ref 4.2–5.4)
SODIUM SERPL-SCNC: 139 MMOL/L (ref 135–145)
WBC # BLD AUTO: 14.8 K/UL (ref 4.8–10.8)

## 2021-11-16 PROCEDURE — 82962 GLUCOSE BLOOD TEST: CPT | Mod: 91

## 2021-11-16 PROCEDURE — 36415 COLL VENOUS BLD VENIPUNCTURE: CPT

## 2021-11-16 PROCEDURE — 700111 HCHG RX REV CODE 636 W/ 250 OVERRIDE (IP): Performed by: STUDENT IN AN ORGANIZED HEALTH CARE EDUCATION/TRAINING PROGRAM

## 2021-11-16 PROCEDURE — 83930 ASSAY OF BLOOD OSMOLALITY: CPT

## 2021-11-16 PROCEDURE — 93005 ELECTROCARDIOGRAM TRACING: CPT | Performed by: STUDENT IN AN ORGANIZED HEALTH CARE EDUCATION/TRAINING PROGRAM

## 2021-11-16 PROCEDURE — A9270 NON-COVERED ITEM OR SERVICE: HCPCS | Performed by: STUDENT IN AN ORGANIZED HEALTH CARE EDUCATION/TRAINING PROGRAM

## 2021-11-16 PROCEDURE — 700101 HCHG RX REV CODE 250: Performed by: STUDENT IN AN ORGANIZED HEALTH CARE EDUCATION/TRAINING PROGRAM

## 2021-11-16 PROCEDURE — 82306 VITAMIN D 25 HYDROXY: CPT

## 2021-11-16 PROCEDURE — 85025 COMPLETE CBC W/AUTO DIFF WBC: CPT

## 2021-11-16 PROCEDURE — 80053 COMPREHEN METABOLIC PANEL: CPT

## 2021-11-16 PROCEDURE — 770020 HCHG ROOM/CARE - TELE (206)

## 2021-11-16 PROCEDURE — 700105 HCHG RX REV CODE 258

## 2021-11-16 PROCEDURE — 700102 HCHG RX REV CODE 250 W/ 637 OVERRIDE(OP): Performed by: STUDENT IN AN ORGANIZED HEALTH CARE EDUCATION/TRAINING PROGRAM

## 2021-11-16 PROCEDURE — 92526 ORAL FUNCTION THERAPY: CPT

## 2021-11-16 PROCEDURE — 83605 ASSAY OF LACTIC ACID: CPT

## 2021-11-16 PROCEDURE — 82140 ASSAY OF AMMONIA: CPT

## 2021-11-16 RX ORDER — AMLODIPINE BESYLATE 10 MG/1
10 TABLET ORAL
Status: DISCONTINUED | OUTPATIENT
Start: 2021-11-16 | End: 2021-11-16

## 2021-11-16 RX ORDER — GAUZE BANDAGE 2" X 2"
100 BANDAGE TOPICAL DAILY
Status: DISCONTINUED | OUTPATIENT
Start: 2021-11-16 | End: 2021-11-30 | Stop reason: HOSPADM

## 2021-11-16 RX ORDER — ENALAPRILAT 1.25 MG/ML
1.25 INJECTION INTRAVENOUS EVERY 6 HOURS PRN
Status: DISCONTINUED | OUTPATIENT
Start: 2021-11-16 | End: 2021-11-22 | Stop reason: CLARIF

## 2021-11-16 RX ORDER — METOPROLOL TARTRATE 1 MG/ML
5 INJECTION, SOLUTION INTRAVENOUS
Status: COMPLETED | OUTPATIENT
Start: 2021-11-16 | End: 2021-11-17

## 2021-11-16 RX ORDER — LABETALOL HYDROCHLORIDE 5 MG/ML
10 INJECTION, SOLUTION INTRAVENOUS EVERY 4 HOURS PRN
Status: DISCONTINUED | OUTPATIENT
Start: 2021-11-16 | End: 2021-11-22 | Stop reason: CLARIF

## 2021-11-16 RX ADMIN — DOCUSATE SODIUM 50 MG AND SENNOSIDES 8.6 MG 2 TABLET: 8.6; 5 TABLET, FILM COATED ORAL at 16:35

## 2021-11-16 RX ADMIN — Medication 100 MG: at 07:17

## 2021-11-16 RX ADMIN — SODIUM CHLORIDE, POTASSIUM CHLORIDE, SODIUM LACTATE AND CALCIUM CHLORIDE: 600; 310; 30; 20 INJECTION, SOLUTION INTRAVENOUS at 17:09

## 2021-11-16 RX ADMIN — METOPROLOL TARTRATE 25 MG: 25 TABLET, FILM COATED ORAL at 20:22

## 2021-11-16 RX ADMIN — MULTIPLE VITAMINS W/ MINERALS TAB 1 TABLET: TAB at 06:23

## 2021-11-16 RX ADMIN — INSULIN LISPRO 1 UNITS: 100 INJECTION, SOLUTION INTRAVENOUS; SUBCUTANEOUS at 11:29

## 2021-11-16 RX ADMIN — AMLODIPINE BESYLATE 10 MG: 10 TABLET ORAL at 07:17

## 2021-11-16 RX ADMIN — METOPROLOL TARTRATE 5 MG: 5 INJECTION INTRAVENOUS at 16:48

## 2021-11-16 RX ADMIN — LABETALOL HYDROCHLORIDE 10 MG: 5 INJECTION, SOLUTION INTRAVENOUS at 04:24

## 2021-11-16 RX ADMIN — ENOXAPARIN SODIUM 40 MG: 40 INJECTION SUBCUTANEOUS at 07:18

## 2021-11-16 RX ADMIN — METOPROLOL TARTRATE 5 MG: 5 INJECTION INTRAVENOUS at 17:09

## 2021-11-16 ASSESSMENT — PAIN DESCRIPTION - PAIN TYPE
TYPE: ACUTE PAIN
TYPE: ACUTE PAIN

## 2021-11-16 ASSESSMENT — FIBROSIS 4 INDEX: FIB4 SCORE: 1.35

## 2021-11-16 NOTE — DISCHARGE PLANNING
Agency/Facility Name: [facility] of the [facility]  Spoke To: Admission  Outcome: Declined due to max assist and could need long term care needs and they do not provide those.    Agency/Facility Name: [facility]  Spoke To: Munir  Outcome: Does patient have discharge plan?    Agency/Facility Name: [facility]  Spoke To: Jairo  Outcome: Declined due to full on spouses of veterans.    Agency/Facility Name: [facility]  Spoke To: Joaquina  Outcome: Pending review.

## 2021-11-16 NOTE — FLOWSHEET NOTE
Assumed care of pt, received bedside report from Sapphire OCHOA. Pt sitting up in bed, no complaints of pain, room air, A/Ox1. Fall and safety precautions in place, strip alarm in place. Discussed POC with pt, pt verbalizes understanding. No further needs at this time.

## 2021-11-16 NOTE — THERAPY
Speech Language Pathology  Daily Treatment     Patient Name: Viki García  Age:  86 y.o., Sex:  female  Medical Record #: [id number]  Today's Date: 11/16/2021     Precautions: Fall Risk,Swallow Precautions ( See Comments)  Comments: cofused     Assessment    Patient was seen on this date for dysphagia reassessment. Patient awake, with significant expressive language impairment, she was AAOx0. Unable to follow directives for oral motor examination.     PO trials were administered and consisted of ice chips, MTL, applesauce, pudding, and thin liquids. Onset of swallow trigger was minimally delayed with liquids and more prolonged (5-10 seconds) with applesauce and pudding. NO overt s/sx of aspiration with MTL, applesauce, and pudding. Congested coughing x1 following trials of thin liquids concerning for aspiration. Poor PO intake and required encouragement to consume PO trials, eventually declining further PO trials.    Oropharyngeal dysphagia suspect acute r/t encephalopathy. A modified diet is indicated at this time. Patient may benefit from a diagnostic swallow study in the following days with ongoing concerns for dysphagia.     Recommendations  1. Initiation of a pureed and mildly thick liquid    -1:1 feeding  2. Meds crushed in puree  3. Will consider a diagnostic swallow study with any ongoing concerns for aspiration    Plan    Continue current treatment plan.    Discharge Recommendations: Recommend post-acute placement for additional speech therapy services prior to discharge home     Objective       11/16/21 0925   Vitals   O2 Delivery Device None - Room Air   Dysphagia    Positioning / Behavior Modification Modulate Rate or Bite Size   Other Treatments Pre-feeding trials    Diet / Liquid Recommendation Puree (4);Mildly Thick (2) - (Nectar Thick)   Skilled Intervention Compensatory Strategies;Verbal Cueing   Recommended Route of Medication Administration   Medication Administration  Crush all Medications in  Puree;Float Whole with Puree   Short Term Goals   Short Term Goal # 1 The patient will consume prefeeding trials with no overt s/sx of aspiration    Goal Outcome # 1 Goal met, new goal added   Short Term Goal # 1 B  Patient will consume a PU4/MT2 diet with no overt s/sx of aspiratio given 1:1 feeding.

## 2021-11-16 NOTE — PROGRESS NOTES
Monitor Summary  Rhythm: SR/ST/SB  Rate:   Ectopy: R PVC, trigem  0.15 / 0.10 / 0.42    12 hour cc  COVID-19 surge in effect.

## 2021-11-16 NOTE — FLOWSHEET NOTE
Patient bladder scanned at this time for 204mL.  Purewick in place, clear/yellow urine noted in suction cannister.

## 2021-11-16 NOTE — PROGRESS NOTES
Began care at 1845, Report received from Kim OCHOA. Patient in SR/SB/ST on the monitor. Plan of care updated with the patient, no questions at this time. Initial assessment completed, orders reviewed, call light within reach, and fall precautions are in place. Bilat wrist restraints.

## 2021-11-16 NOTE — WOUND TEAM
[facility]  Inpatient Services  Initial Wound and Skin Care Evaluation    Admission Date: 11/12/2021     Last order of IP CONSULT TO WOUND CARE was found on 11/15/2021 from Hospital Encounter on 11/12/2021     HPI, PMH, SH: Reviewed    Past Surgical History:   Procedure Laterality Date   • HYSTERECTOMY, TOTAL ABDOMINAL     • OTHER ORTHOPEDIC SURGERY      NECK     Social History     Tobacco Use   • Smoking status: Never Smoker   • Smokeless tobacco: Never Used   Substance Use Topics   • Alcohol use: Yes     Alcohol/week: 8.4 oz     Types: 14 Cans of beer per week     Chief Complaint   Patient presents with   • Altered Mental Status     pts last seen by her neighbors at her baseline at 1100 yesterday. neighbors went to check on pt and she was on the ground unable to get up. per neighbors pt is more confused, per neighbors pt has undiagnosed dementia     Diagnosis: Dehydration [E86.0]    Unit where seen by Wound Team: T821/00     WOUND CONSULT/FOLLOW UP RELATED TO:  Buttocks & Heels     WOUND HISTORY:  Pt is an older woman admitted with altered mental status and frequent falls at home. Pt has history of diabetes and ETOH use. Pt was found to have pressure injuries to heel and redness to sacrum for which wound team was asked to come evaluate.     WOUND ASSESSMENT/LDA  Moisture Associated Skin Damage 11/16/21 Buttock;Sacrum (Active)   Wound Image   11/16/21 1000   NEXT Weekly Photo (Inpatient Only) 11/23/21    Drainage Amount None    Periwound Assessment Red    IAD Cleansing Foam Cleanser/Washcloth    Periwound Protectant Viscopaste;Barrier Paste    Number of days: 0       Wound 11/13/21 Pressure Injury Heel Left (POA) (Active)   Wound Image    11/16/21 1000   Site Assessment Red;Purple    Periwound Assessment Clean;Dry;Intact    Margins Attached edges;Defined edges    Closure Adhesive bandage    Drainage Amount None    Treatments Cleansed;Site care;Offloading    Wound Cleansing Approved Wound Cleanser     Periwound Protectant Skin Protectant Wipes to Periwound    Dressing Cleansing/Solutions Not Applicable    Dressing Options Mepilex Heel    Dressing Changed New    Dressing Status Clean;Dry;Intact    Dressing Change/Treatment Frequency Every 72 hrs, and As Needed    NEXT Dressing Change/Treatment Date 11/19/21    NEXT Weekly Photo (Inpatient Only) 11/23/21    Pressure Injury Stage DTPI    Non-staged Wound Description Not applicable    Wound Length (cm) 1.5 cm    Wound Width (cm) 1.8 cm    Wound Surface Area (cm^2) 2.7 cm^2    Shape Irregular    Wound Odor None    Exposed Structures YAMILETH    WOUND NURSE ONLY - Time Spent with Patient (mins) 60    Number of days: 3      Vascular:    CINDY:   No results found.    Lab Values:    Lab Results   Component Value Date/Time    WBC 14.8 (H) 11/16/2021 09:33 AM    RBC 4.65 11/16/2021 09:33 AM    HEMOGLOBIN 15.1 11/16/2021 09:33 AM    HEMATOCRIT 44.1 11/16/2021 09:33 AM    HBA1C 6.1 (H) 11/15/2021 12:57 AM      Culture Results show:  No results found for this or any previous visit (from the past 720 hour(s)).    Pain Level/Medicated:  Denies pain       INTERVENTIONS BY WOUND TEAM:  Chart and images reviewed. Discussed with bedside RN. All areas of concern (based on picture review, LDA review and discussion with bedside RN) have been thoroughly assessed. Documentation of areas based on significant findings. This RN in to assess patient. Performed standard wound care which includes appropriate positioning, dressing removal and non-selective debridement. Pictures and measurements obtained weekly if/when required.      BILATERAL HEELS:  Preparation for Dressing removal: Dressing removed without difficult  Non-selectively Debrided with:  NS and gauze.  Sharp debridement: NA  Faye wound: Cleansed with NS and gauze, Prepped with no sting  Primary Dressing: heel mepilex  Secondary (Outer) Dressing: pillow offloading         SACRUM:  Preparation for Dressing removal: NA open to  air  Cleansed with:  NS and gauze.  Sharp debridement: NA  Faye wound: Cleansed with NS and gauze, Prepped with barrier  Primary Dressing: Barrier paste  Secondary (Outer) Dressing: Offloading measures      Interdisciplinary consultation: Patient, Bedside RN,    EVALUATION / RATIONALE FOR TREATMENT:  Most Recent Date:  11/16/21: Redness to sacrum and right heel noted appears to be blanching but POA evolving DTI can't be excluded. Left heel with purple POA DTI. Offloading measures in place.      Goals: Steady decrease in wound area and depth weekly.    WOUND TEAM PLAN OF CARE ([X] for frequency of wound follow up,):   Nursing to follow orders written for wound care. Contact wound team if area fails to progress, deteriorates or with any questions/concerns  Dressing changes by wound team:                   Follow up 3 times weekly:                NPWT change 3 times weekly:     Follow up 1-2 times weekly:      Follow up Bi-Monthly:                   Follow up as needed:   X  Other (explain):     NURSING PLAN OF CARE ORDERS (X):  Dressing changes: See Dressing Care orders: X  Skin care: See Skin Care orders:   RN Prevention Protocol:   Rectal tube care: See Rectal Tube Care orders:   Other orders:    RSKIN:   CURRENTLY IN PLACE (X), APPLIED THIS VISIT (A), ORDERED (O):   Q shift Thomas:  X  Q shift pressure point assessments:  X    Surface/Positioning   Pressure redistribution mattress    X      Low Airloss          Bariatric foam      Bariatric MIO     Waffle cushion        Waffle Overlay   X      Reposition q 2 hours   X   TAPs Turning system     Z Carlos Pillow     Offloading/Redistribution   Sacral Mepilex (Silicone dressing)     Heel Mepilex (Silicone dressing)   X      Heel float boots (Prevalon boot)             Float Heels off Bed with Pillows     X      Respiratory RA  Silicone O2 tubing         Gray Foam Ear protectors     Cannula fixation Device (Tender )          High flow offloading Clip    Elastic head  band offloading device      Anchorfast                                                         Trach with Optifoam split foam             Containment/Moisture Prevention     Rectal tube or BMS    Purwick/Condom Cath  X      Barnes Catheter    Barrier wipes   X        Barrier paste   X    Antifungal tx      Interdry        Mobilization       Up to chair  X      Ambulate      PT/OT      Nutrition       Dietician        Diabetes Education      PO  X   TF     TPN     NPO   # days     Other        Anticipated discharge plans:   LTACH:        SNF/Rehab:   X               Home Health Care:           Outpatient Wound Center:            Self/Family Care:        Other:                  Vac Discharge Needs:   Not Applicable Pt not on a wound vac:     X  Regular Vac while inpatient, alternative dressing at DC:        Regular Vac in use and continued at DC:            Reg. Vac w/ Skin Sub/Biologic in use. Will need to be changed 2x wkly:      Veraflo Vac while inpatient, ok to transition to Regular Vac on Discharge:           Veraflo Vac while inpatient, will need to remain on Veraflo Vac upon discharge:

## 2021-11-16 NOTE — CARE PLAN
The patient is Watcher - Medium risk of patient condition declining or worsening    Shift Goals  Clinical Goals: Monitor Mental Status  Patient Goals: NA  Family Goals: NA    Progress made toward(s) clinical / shift goals:    Problem: Seizure Precautions  Goal: Implementation of seizure precautions  Outcome: Progressing  Suction bedside     Problem: Psychosocial  Goal: Patient's level of anxiety will decrease  Outcome: Progressing  Pt less agitated and smiles occasionally     Problem: Risk for Aspiration  Goal: Patient's risk for aspiration will be absent or decrease  Outcome: Progressing   Speech has seen this pt and recommended sips with meds with thickened liquids    Patient is not progressing towards the following goals:

## 2021-11-16 NOTE — CARE PLAN
The patient is Unstable - High likelihood or risk of patient condition declining or worsening    Shift Goals  Clinical Goals: Restraints, safety, swallow studies  Patient Goals: Unable to assess  Family Goals: NA    Progress made toward(s) clinical / shift goals:     Problem: Optimal Care for Alcohol Withdrawal  Goal: Optimal Care for the alcohol withdrawal patient  Outcome: Progressing     Problem: Seizure Precautions  Goal: Implementation of seizure precautions  Outcome: Progressing     Problem: Risk for Aspiration  Goal: Patient's risk for aspiration will be absent or decrease  Outcome: Progressing     Problem: Skin Integrity  Goal: Skin integrity is maintained or improved  Outcome: Progressing

## 2021-11-16 NOTE — PROGRESS NOTES
4 Eyes Skin Assessment Completed by GABRIELA Sorto and GABRIELA He.    Head WDL  Ears WDL  Nose WDL  Mouth WDL, dry  Neck WDL  Breast/Chest WDL  Shoulder Blades Redness and Blanching  Spine Redness  (R) Arm/Elbow/Hand Redness and Blanching, slow to carroll elbows  (L) Arm/Elbow/Hand Redness and Blanching, slow to carroll elbws  Abdomen WDL  Groin Redness  Scrotum/Coccyx/Buttocks Redness, Blanching and Excoriation (moisture related from urine)  (R) Leg Redness, Blanching and Bruising  (L) Leg Redness, Blanching and Bruising  (R) Heel/Foot/Toe Redness and Boggy  (L) Heel/Foot/Toe Redness and Boggy      Devices In Places Tele Box      Interventions In Place InterDry, Heel Mepilex, Sacral Mepilex, Chair Waffle, Pillows, Elbow Mepilex, Barrier Cream, Dri-Carlos Pads and Heels Loaded W/Pillows    Possible Skin Injury Yes    Pictures Uploaded Into Epic Yes  Wound Consult Placed Yes  RN Wound Prevention Protocol Ordered Yes

## 2021-11-17 ENCOUNTER — APPOINTMENT (OUTPATIENT)
Dept: RADIOLOGY | Facility: MEDICAL CENTER | Age: 86
DRG: 057 | End: 2021-11-17
Payer: MEDICARE

## 2021-11-17 PROBLEM — I48.91 ATRIAL FIBRILLATION WITH RVR (HCC): Status: ACTIVE | Noted: 2021-11-17

## 2021-11-17 LAB
ALBUMIN SERPL BCP-MCNC: 2.9 G/DL (ref 3.2–4.9)
ALBUMIN/GLOB SERPL: 1.1 G/DL
ALP SERPL-CCNC: 94 U/L (ref 30–99)
ALT SERPL-CCNC: 24 U/L (ref 2–50)
ANION GAP SERPL CALC-SCNC: 10 MMOL/L (ref 7–16)
AST SERPL-CCNC: 15 U/L (ref 12–45)
BASOPHILS # BLD AUTO: 0.1 % (ref 0–1.8)
BASOPHILS # BLD: 0.02 K/UL (ref 0–0.12)
BILIRUB SERPL-MCNC: 1.3 MG/DL (ref 0.1–1.5)
BUN SERPL-MCNC: 10 MG/DL (ref 8–22)
CALCIUM SERPL-MCNC: 8.6 MG/DL (ref 8.5–10.5)
CHLORIDE SERPL-SCNC: 101 MMOL/L (ref 96–112)
CO2 SERPL-SCNC: 25 MMOL/L (ref 20–33)
CREAT SERPL-MCNC: 0.53 MG/DL (ref 0.5–1.4)
EKG IMPRESSION: NORMAL
EOSINOPHIL # BLD AUTO: 0.32 K/UL (ref 0–0.51)
EOSINOPHIL NFR BLD: 2 % (ref 0–6.9)
ERYTHROCYTE [DISTWIDTH] IN BLOOD BY AUTOMATED COUNT: 45.4 FL (ref 35.9–50)
GLOBULIN SER CALC-MCNC: 2.7 G/DL (ref 1.9–3.5)
GLUCOSE BLD-MCNC: 102 MG/DL (ref 65–99)
GLUCOSE BLD-MCNC: 107 MG/DL (ref 65–99)
GLUCOSE BLD-MCNC: 110 MG/DL (ref 65–99)
GLUCOSE BLD-MCNC: 113 MG/DL (ref 65–99)
GLUCOSE SERPL-MCNC: 118 MG/DL (ref 65–99)
HCT VFR BLD AUTO: 41.8 % (ref 37–47)
HGB BLD-MCNC: 14.4 G/DL (ref 12–16)
IMM GRANULOCYTES # BLD AUTO: 0.09 K/UL (ref 0–0.11)
IMM GRANULOCYTES NFR BLD AUTO: 0.6 % (ref 0–0.9)
LYMPHOCYTES # BLD AUTO: 2.31 K/UL (ref 1–4.8)
LYMPHOCYTES NFR BLD: 14.4 % (ref 22–41)
MAGNESIUM SERPL-MCNC: 1.6 MG/DL (ref 1.5–2.5)
MCH RBC QN AUTO: 32.3 PG (ref 27–33)
MCHC RBC AUTO-ENTMCNC: 34.4 G/DL (ref 33.6–35)
MCV RBC AUTO: 93.7 FL (ref 81.4–97.8)
MONOCYTES # BLD AUTO: 0.97 K/UL (ref 0–0.85)
MONOCYTES NFR BLD AUTO: 6.1 % (ref 0–13.4)
NEUTROPHILS # BLD AUTO: 12.32 K/UL (ref 2–7.15)
NEUTROPHILS NFR BLD: 76.8 % (ref 44–72)
NRBC # BLD AUTO: 0 K/UL
NRBC BLD-RTO: 0 /100 WBC
PLATELET # BLD AUTO: 290 K/UL (ref 164–446)
PMV BLD AUTO: 9.9 FL (ref 9–12.9)
POTASSIUM SERPL-SCNC: 3 MMOL/L (ref 3.6–5.5)
PROT SERPL-MCNC: 5.6 G/DL (ref 6–8.2)
RBC # BLD AUTO: 4.46 M/UL (ref 4.2–5.4)
SODIUM SERPL-SCNC: 136 MMOL/L (ref 135–145)
WBC # BLD AUTO: 16 K/UL (ref 4.8–10.8)

## 2021-11-17 PROCEDURE — 70250 X-RAY EXAM OF SKULL: CPT

## 2021-11-17 PROCEDURE — 71045 X-RAY EXAM CHEST 1 VIEW: CPT

## 2021-11-17 PROCEDURE — 700101 HCHG RX REV CODE 250: Performed by: STUDENT IN AN ORGANIZED HEALTH CARE EDUCATION/TRAINING PROGRAM

## 2021-11-17 PROCEDURE — 85025 COMPLETE CBC W/AUTO DIFF WBC: CPT

## 2021-11-17 PROCEDURE — 72170 X-RAY EXAM OF PELVIS: CPT

## 2021-11-17 PROCEDURE — 74018 RADEX ABDOMEN 1 VIEW: CPT

## 2021-11-17 PROCEDURE — 700105 HCHG RX REV CODE 258

## 2021-11-17 PROCEDURE — 770020 HCHG ROOM/CARE - TELE (206)

## 2021-11-17 PROCEDURE — 82962 GLUCOSE BLOOD TEST: CPT

## 2021-11-17 PROCEDURE — A9270 NON-COVERED ITEM OR SERVICE: HCPCS | Performed by: STUDENT IN AN ORGANIZED HEALTH CARE EDUCATION/TRAINING PROGRAM

## 2021-11-17 PROCEDURE — 700102 HCHG RX REV CODE 250 W/ 637 OVERRIDE(OP): Performed by: STUDENT IN AN ORGANIZED HEALTH CARE EDUCATION/TRAINING PROGRAM

## 2021-11-17 PROCEDURE — 36415 COLL VENOUS BLD VENIPUNCTURE: CPT

## 2021-11-17 PROCEDURE — 97530 THERAPEUTIC ACTIVITIES: CPT

## 2021-11-17 PROCEDURE — 83735 ASSAY OF MAGNESIUM: CPT

## 2021-11-17 PROCEDURE — 93010 ELECTROCARDIOGRAM REPORT: CPT | Performed by: INTERNAL MEDICINE

## 2021-11-17 PROCEDURE — 80053 COMPREHEN METABOLIC PANEL: CPT

## 2021-11-17 PROCEDURE — 700111 HCHG RX REV CODE 636 W/ 250 OVERRIDE (IP): Performed by: STUDENT IN AN ORGANIZED HEALTH CARE EDUCATION/TRAINING PROGRAM

## 2021-11-17 RX ORDER — POTASSIUM CHLORIDE 20 MEQ/1
40 TABLET, EXTENDED RELEASE ORAL ONCE
Status: DISCONTINUED | OUTPATIENT
Start: 2021-11-17 | End: 2021-11-17

## 2021-11-17 RX ORDER — ASPIRIN 325 MG
325 TABLET ORAL ONCE
Status: ACTIVE | OUTPATIENT
Start: 2021-11-17 | End: 2021-11-18

## 2021-11-17 RX ORDER — POTASSIUM CHLORIDE 7.45 MG/ML
10 INJECTION INTRAVENOUS
Status: COMPLETED | OUTPATIENT
Start: 2021-11-17 | End: 2021-11-17

## 2021-11-17 RX ORDER — METOPROLOL TARTRATE 50 MG/1
50 TABLET, FILM COATED ORAL TWICE DAILY
Status: DISCONTINUED | OUTPATIENT
Start: 2021-11-17 | End: 2021-11-18

## 2021-11-17 RX ORDER — ATORVASTATIN CALCIUM 80 MG/1
80 TABLET, FILM COATED ORAL EVERY EVENING
Status: DISCONTINUED | OUTPATIENT
Start: 2021-11-17 | End: 2021-11-20

## 2021-11-17 RX ADMIN — DOCUSATE SODIUM 50 MG AND SENNOSIDES 8.6 MG 2 TABLET: 8.6; 5 TABLET, FILM COATED ORAL at 17:22

## 2021-11-17 RX ADMIN — POTASSIUM CHLORIDE 10 MEQ: 10 INJECTION, SOLUTION INTRAVENOUS at 11:22

## 2021-11-17 RX ADMIN — POTASSIUM CHLORIDE 10 MEQ: 10 INJECTION, SOLUTION INTRAVENOUS at 13:56

## 2021-11-17 RX ADMIN — POTASSIUM CHLORIDE 10 MEQ: 10 INJECTION, SOLUTION INTRAVENOUS at 09:28

## 2021-11-17 RX ADMIN — ATORVASTATIN CALCIUM 80 MG: 80 TABLET, FILM COATED ORAL at 17:22

## 2021-11-17 RX ADMIN — MULTIPLE VITAMINS W/ MINERALS TAB 1 TABLET: TAB at 06:08

## 2021-11-17 RX ADMIN — ENOXAPARIN SODIUM 40 MG: 40 INJECTION SUBCUTANEOUS at 06:09

## 2021-11-17 RX ADMIN — SODIUM CHLORIDE, POTASSIUM CHLORIDE, SODIUM LACTATE AND CALCIUM CHLORIDE: 600; 310; 30; 20 INJECTION, SOLUTION INTRAVENOUS at 00:18

## 2021-11-17 RX ADMIN — Medication 100 MG: at 06:08

## 2021-11-17 RX ADMIN — POTASSIUM CHLORIDE 10 MEQ: 10 INJECTION, SOLUTION INTRAVENOUS at 08:16

## 2021-11-17 RX ADMIN — METOPROLOL TARTRATE 25 MG: 25 TABLET, FILM COATED ORAL at 06:08

## 2021-11-17 RX ADMIN — METOPROLOL TARTRATE 5 MG: 5 INJECTION INTRAVENOUS at 23:06

## 2021-11-17 RX ADMIN — METOPROLOL TARTRATE 50 MG: 50 TABLET, FILM COATED ORAL at 17:23

## 2021-11-17 ASSESSMENT — COGNITIVE AND FUNCTIONAL STATUS - GENERAL
WALKING IN HOSPITAL ROOM: TOTAL
MOVING FROM LYING ON BACK TO SITTING ON SIDE OF FLAT BED: UNABLE
MOBILITY SCORE: 6
TURNING FROM BACK TO SIDE WHILE IN FLAT BAD: UNABLE
MOVING TO AND FROM BED TO CHAIR: UNABLE
CLIMB 3 TO 5 STEPS WITH RAILING: TOTAL
STANDING UP FROM CHAIR USING ARMS: TOTAL
SUGGESTED CMS G CODE MODIFIER MOBILITY: CN

## 2021-11-17 ASSESSMENT — PAIN DESCRIPTION - PAIN TYPE: TYPE: ACUTE PAIN

## 2021-11-17 ASSESSMENT — GAIT ASSESSMENTS: GAIT LEVEL OF ASSIST: UNABLE TO PARTICIPATE

## 2021-11-17 ASSESSMENT — FIBROSIS 4 INDEX: FIB4 SCORE: 0.91

## 2021-11-17 NOTE — PROGRESS NOTES
Assumed care of pt. Bedside report received from night RN Frida. Pt appears to be asleep. Call light, phone and personal belongings within reach. Bed locked in lowest position, 2 side rails up, bed alarm on and working appropriately.

## 2021-11-17 NOTE — PROGRESS NOTES
Daily Progress Note:     Date of Service: 11/15/2021  Primary Team: [facility] IM Blue Team   Attending: Dr. Dooley  Senior Resident: Dr. Nolen  Intern: Dr. Arreaga  Contact:  [phone]    Chief Complaint:   AMS, Hypernatremia     Interval Hx:   Pt getting agitated, pulled out IV, started Seroquel 25 PRN.  Received seroquel overnight. Somnolent during exam      Consultants/Specialty:    Review of Systems:    Review of Systems   Unable to perform ROS: Mental acuity       Objective Data:   Physical Exam:     Physical Exam  Constitutional:       General: She is sleeping.      Interventions: She is sedated.      Comments: Somnolent, Unable to follow commands.    HENT:      Mouth/Throat:      Mouth: Mucous membranes are dry.   Eyes:      Extraocular Movements: Extraocular movements intact.   Cardiovascular:      Rate and Rhythm: Normal rate and regular rhythm.      Pulses: Normal pulses.      Heart sounds: Normal heart sounds. No murmur heard.  No friction rub.   Pulmonary:      Effort: Pulmonary effort is normal. No respiratory distress.      Breath sounds: Normal breath sounds. No stridor.   Abdominal:      General: Abdomen is flat. There is no distension.      Palpations: Abdomen is soft.      Tenderness: There is no abdominal tenderness.   Musculoskeletal:         General: No swelling, tenderness, deformity or signs of injury. Normal range of motion.           Labs:   Lab Results   Component Value Date/Time    SODIUM 136 11/17/2021 01:55 AM    POTASSIUM 3.0 (L) 11/17/2021 01:55 AM    CHLORIDE 101 11/17/2021 01:55 AM    CO2 25 11/17/2021 01:55 AM    GLUCOSE 118 (H) 11/17/2021 01:55 AM    BUN 10 11/17/2021 01:55 AM    CREATININE 0.53 11/17/2021 01:55 AM    CREATININE 0.7 02/25/2008 06:05 AM    BUNCREATRAT 19 06/20/2016 10:28 AM      Lab Results   Component Value Date/Time    PROTHROMBTM 11.2 02/19/2008 05:30 AM    INR 0.97 02/19/2008 05:30 AM      Lab Results   Component Value Date/Time    WBC 16.0 (H) 11/17/2021 01:55  AM    RBC 4.46 11/17/2021 01:55 AM    HEMOGLOBIN 14.4 11/17/2021 01:55 AM    HEMATOCRIT 41.8 11/17/2021 01:55 AM    MCV 93.7 11/17/2021 01:55 AM    MCH 32.3 11/17/2021 01:55 AM    MCHC 34.4 11/17/2021 01:55 AM    MPV 9.9 11/17/2021 01:55 AM    NEUTSPOLYS 76.80 (H) 11/17/2021 01:55 AM    LYMPHOCYTES 14.40 (L) 11/17/2021 01:55 AM    MONOCYTES 6.10 11/17/2021 01:55 AM    EOSINOPHILS 2.00 11/17/2021 01:55 AM    BASOPHILS 0.10 11/17/2021 01:55 AM        Imaging:   IR-US GUIDED PIV   Final Result    Ultrasound-guided PERIPHERAL IV INSERTION performed by    qualified nursing staff as above.      EC-ECHOCARDIOGRAM COMPLETE W/O CONT   Final Result      DX-CHEST-PORTABLE (1 VIEW)   Final Result      Limited exam showing no acute cardiopulmonary disease.      CT-HEAD W/O   Final Result      1.  Extensive atrophy and white matter changes.   2.  No acute intracranial hemorrhage or territorial infarct.   3.  Small chronic LEFT frontal infarct.          Problem Representation:     * Encephalopathy- (present on admission)  Assessment & Plan  Hx of Dementia and per PCP note, longterm alcohol use starting at age 8, possible wernicke worsening dementia. Pt found on ground, No signs of fall/trauma. Na, BUN, CPK elevated, normal lactic acid. Dry on PE, possible worsened by Dehydrated state.   CT head- no hematoma, signs of vascular dementia. Will test further w/ MRI head. Will need full body Xray to see if pt has metals.   Will discontinue CIWA due to encephalopathic state.   B12, TSH normal not likely metabolic, ordered Folate.   - CXR and U/A unremarkable, will obtain Urine Culture.   - Continue high dose Thiamine, Folate, Multivitamin Supplementation.   - 1:1 feed to ensure drinks 2-3L/day  - trend CPK q12 as first 500 until downtrends   - Daily standing weight   I/O daily   - Will speak to POA to gain more information on Baseline.        Debility- (present on admission)  Assessment & Plan  Hx of dementia, long term alcohol use.      Per neighbor worsened mental function in last year.   Will assess after care placement. Currently unlikely pt able to care for self.     DVT prophylaxis  Assessment & Plan  Held due to bleeding risk, CT negative for ICH, will further workup w/ MRI head.   - If negative will consider adding Lovenox as normal renal function     Hypovolemia dehydration- (present on admission)  Assessment & Plan  In ED, pt AMS, elevated BUN, Hypernatremc(149-150), Dry  given 1L ns IVF resuscitation in ED , not eating, volume down.   No renal dysfunction,  Pt on continuous 1/2 NS IVF overnight, repeat Na levels decreased 150 to 148, will change to D5W continuous fluids.   Changed D5W to LR after rapid correction of Hypernatremia.    - On whick catheter, minimal output, Bladder scan shows 403ml fluid, will repeat scan and straight cath.     Alcohol withdrawal (HCC)  Assessment & Plan    Longstanding hx of alcohol use. Per pcp note pt states drinking since age of 8.   Pt was placed on CIWA protocol, score of 16 on admission. This morning given    Ativan, became unresponsive.   Discontinued Ativan, will monitor sxs.   alcohol level negative.  - Continue multivitamin, folate, and high dose IV thiamine supplementation.       Essential (primary) hypertension  Assessment & Plan  Hx of HTN on Amlodipine 10mg   BP has been within range, held medications for now.   Overnight 11/14 pt hypertensive, added PRN labetalol.     Type 2 diabetes mellitus without complication, without long-term current use of insulin (HCC)- (present on admission)  Assessment & Plan  Hx of DM2, per PCP note not medicated.   Will monitor glucose, added SSI.

## 2021-11-17 NOTE — PROGRESS NOTES
Daily Progress Note:     Date of Service: 11/17/2021  Primary Team: [facility] IM Blue Team   Attending: Dr. Dooley.   Senior Resident: Dr. Nolen   Intern: Dr. Arreaga   Contact:  [phone]    Chief Complaint:   AMS, Hypernatremia     ID: Patient is an 86-year-old female with a significant history of baseline dementia(A&O x1), who was admitted on 11/12 for altered mental status. Developed Afib RVR while hospitalized.     Overnight: No acute events overnight    Interval Hx:  -Poor historian.  Patient somnolent in the morning, unable to be aroused.  Evaluated patient around noon, patient sit laying awake in bed.  Patient oriented x1 to self, not able to follow commands.  Asked if she has any pain, patient nodded no.  Patient continues to have A. Fib RVR, on Metrop 50mg BID.       11/16: Midline placed, attempted to call DPOA x2, went to . Will try again later today    Around 4:30 PM, nurse paged about HR in 140s. Patient ia alert, verbal but unable to understand. Ordered STAT EKG, showed Afib with RVR at . Gave her IV metoprolol 5 mg x1, HR went down to 115, SBP at 120s.     Called and spoke with DPOA, she confirmed DNR/DNI, no ICU transfers. Prior to this admission, she was living alone, taking care of her self, A&Ox 1-2, can not engage in conversation, poorly eating. Not on any home medications. The neighbor helps with shopping, groceries, lawn care. DPJUAN is agreeable to come and meet the team on Thursday at 1 PM.    Consultants/Specialty:  SLP  PT  OT    Review of Systems:    Review of Systems   Unable to perform ROS: Mental acuity       Objective Data:   Physical Exam:   Vitals:   Temp:  [36.6 °C (97.8 °F)-37.4 °C (99.3 °F)] 37.1 °C (98.7 °F)  Pulse:  [126-132] 128  Resp:  [16] 16  BP: (121-128)/(71-98) 122/76  SpO2:  [91 %-97 %] 93 %     PHYSICAL EXAM: Limited because of AMS  Physical Exam  Constitutional:       General: She is sleeping. She is not in acute distress.     Appearance: She is ill-appearing.       Interventions: She is sedated.      Comments: Mostly somnolent, oriented to self  Not able to follow commands   HENT:      Head: Normocephalic and atraumatic.      Nose: Nose normal.      Mouth/Throat:      Mouth: Mucous membranes are moist.   Eyes:      Conjunctiva/sclera: Conjunctivae normal.      Pupils: Pupils are equal, round, and reactive to light.   Cardiovascular:      Rate and Rhythm: Normal rate and regular rhythm.      Pulses: Normal pulses.      Heart sounds: Normal heart sounds.   Pulmonary:      Effort: Pulmonary effort is normal. No respiratory distress.      Breath sounds: Normal breath sounds.   Abdominal:      General: Bowel sounds are normal.      Palpations: Abdomen is soft. There is no mass.      Tenderness: There is no abdominal tenderness.   Musculoskeletal:         General: No swelling or tenderness.   Skin:     General: Skin is warm.      Capillary Refill: Capillary refill takes less than 2 seconds.   Neurological:      Comments: Unable to perform complete neurological exam because of AMS   Psychiatric:      Comments: Unable to assess       Labs:   Lab Results   Component Value Date/Time    SODIUM 136 11/17/2021 01:55 AM    POTASSIUM 3.0 (L) 11/17/2021 01:55 AM    CHLORIDE 101 11/17/2021 01:55 AM    CO2 25 11/17/2021 01:55 AM    GLUCOSE 118 (H) 11/17/2021 01:55 AM    BUN 10 11/17/2021 01:55 AM    CREATININE 0.53 11/17/2021 01:55 AM    CREATININE 0.7 02/25/2008 06:05 AM    BUNCREATRAT 19 06/20/2016 10:28 AM      Lab Results   Component Value Date/Time    PROTHROMBTM 11.2 02/19/2008 05:30 AM    INR 0.97 02/19/2008 05:30 AM      Lab Results   Component Value Date/Time    WBC 16.0 (H) 11/17/2021 01:55 AM    RBC 4.46 11/17/2021 01:55 AM    HEMOGLOBIN 14.4 11/17/2021 01:55 AM    HEMATOCRIT 41.8 11/17/2021 01:55 AM    MCV 93.7 11/17/2021 01:55 AM    MCH 32.3 11/17/2021 01:55 AM    MCHC 34.4 11/17/2021 01:55 AM    MPV 9.9 11/17/2021 01:55 AM    NEUTSPOLYS 76.80 (H) 11/17/2021 01:55 AM     LYMPHOCYTES 14.40 (L) 11/17/2021 01:55 AM    MONOCYTES 6.10 11/17/2021 01:55 AM    EOSINOPHILS 2.00 11/17/2021 01:55 AM    BASOPHILS 0.10 11/17/2021 01:55 AM        Imaging:   IR-US GUIDED PIV   Final Result    Ultrasound-guided PERIPHERAL IV INSERTION performed by    qualified nursing staff as above.      EC-ECHOCARDIOGRAM COMPLETE W/O CONT   Final Result      DX-CHEST-PORTABLE (1 VIEW)   Final Result      Limited exam showing no acute cardiopulmonary disease.      CT-HEAD W/O   Final Result      1.  Extensive atrophy and white matter changes.   2.  No acute intracranial hemorrhage or territorial infarct.   3.  Small chronic LEFT frontal infarct.          Problem Representation:   # Acute Encephalopathy  -H/o baseline Dementia worsening for the past 6 months  -Longterm alcohol use with possible wernicke's encephalopathy   -Hypernatemia on admission  -BUN, CPK elevated on exam, possible dehydration and uremia worsening the dementia  -CT head- no hematoma, signs of vascular dementia  -B12/Folate, TSH WNL  -CXR and U/A unremarkable, BC - pending  -Monitor labs  -PT, OT, SLP evaluation  -Continue high dose Thiamine, Folate, Multivitamin Supplementation  -Continue IVF for now  -On soft restraints  Ordering xray abdomen, head, chest for evaluation of foreign body for MRI head for stroke workup.       # New onset Atrial fibrillation with RVR  -IV metoprolol PRN   -Started on Metoprolol 50mg BID, will titrate as needed  -Will discuss about anti-coagulation with DPOA    #Hypertensive urgency with h/o Hypertension  -Held amlodipine 10mg OD as she was started on metoprolol  -PRN labetolol and enalapril with parameters    # Hypernatremia  -Resolved    # Hypokalemia  -Monitor and replete PRN    # Pre-diabetes  -HbA1C 6.1 in 11/2021  -On ISS, accucheks for hyperglycemia with hypoglycemia protocol    # Leukocytosis  -11/16: WBC at 14.8, on admission at 16.7  -Likely reactive    #Lactic acidosis  -Resolved  -Possible  dehydration    # Debility  # Severe protein calorie malnutrition  -Dietary consult  -Continue thiamine and MVT  -Discuss with DPOA on tube feeds    # Chronic alcohol use  -No signs of withdrawal      CODE: DNR/DNI  DVT Prophylaxis: Lovenox  DISPO: AMS, not medically stable, poor prognosis      Sridevi Nolen M.D.

## 2021-11-17 NOTE — THERAPY
"Physical Therapy   Daily Treatment     Patient Name: Viik García  Age:  86 y.o., Sex:  female  Medical Record #: [id number]  Today's Date: 11/17/2021     Precautions  Precautions: Fall Risk;Swallow Precautions ( See Comments)  Comments: confused    Assessment    Attempted to see pt for PT tx in AM but too lethargic. Pt alert and somewhat appropriate for therapy in PM. Pt following single step commands and cooperative with mobility. Mod assist required for bed mobility and STS 3x. Pt then refused further mobility stating \"Throw me back into bed.\" PT will cont while in acute to address deficits.      Plan    Continue current treatment plan.    DC Equipment Recommendations: Unable to determine at this time  Discharge Recommendations: Recommend post-acute placement for additional physical therapy services prior to discharge home      Subjective    \"You're Cruella, go get a young strong man to help me\"         11/17/21 1549   Cognition    Cognition / Consciousness X   Speech/ Communication Delayed Responses   Orientation Level Not Oriented to Reason;Not Oriented to Place;Not Oriented to Year   Level of Consciousness Alert   Ability To Follow Commands 1 Step   Safety Awareness Impaired;Impulsive   New Learning Impaired   Attention Impaired   Sequencing Impaired   Initiation Impaired   Comments imrpoved cog from eval but still confused   Neuro-Muscular Treatments   Neuro-Muscular Treatments Weight Shift Left;Weight Shift Right;Verbal Cuing;Anterior weight shift;Compensatory Strategies   Comments EOB   Neurological Concerns   Neurological Concerns Yes   Sitting Posture During ADL's Posterior Lean   Standing Posture During ADL's Posterior Lean   Balance   Sitting Balance (Static) Poor +   Sitting Balance (Dynamic) Poor -   Standing Balance (Static) Trace   Standing Balance (Dynamic) Trace   Weight Shift Sitting Poor   Weight Shift Standing Poor   Skilled Intervention Verbal Cuing;Compensatory Strategies   Comments standing " with HHA   Gait Analysis   Gait Level Of Assist Unable to Participate   Bed Mobility    Supine to Sit Moderate Assist   Sit to Supine Moderate Assist   Scooting Moderate Assist   Skilled Intervention Verbal Cuing   Functional Mobility   Sit to Stand Moderate Assist   Bed, Chair, Wheelchair Transfer Unable to Participate   Toilet Transfers Unable to Participate   Mobility EOB and STS   Skilled Intervention Verbal Cuing;Compensatory Strategies   Short Term Goals    Short Term Goal # 1 pt will be able to complete supine<>sitting with SPV in 6tx in order to return home   Goal Outcome # 1 goal not met   Short Term Goal # 2 pt will be able to complete functional transfers with min assist in 6tx in order to progress back to prior level   Goal Outcome # 2 Goal not met   Short Term Goal # 3 pt will be able to ambulate 25ft with FWW and min assist in 6tx in order to progress home   Goal Outcome # 3 Goal not met   Anticipated Discharge Equipment and Recommendations   DC Equipment Recommendations Unable to determine at this time   Discharge Recommendations Recommend post-acute placement for additional physical therapy services prior to discharge home

## 2021-11-18 LAB
25(OH)D3 SERPL-MCNC: 19 NG/ML (ref 30–80)
ALBUMIN SERPL BCP-MCNC: 2.6 G/DL (ref 3.2–4.9)
ALBUMIN/GLOB SERPL: 0.9 G/DL
ALP SERPL-CCNC: 106 U/L (ref 30–99)
ALT SERPL-CCNC: 20 U/L (ref 2–50)
ANION GAP SERPL CALC-SCNC: 11 MMOL/L (ref 7–16)
AST SERPL-CCNC: 11 U/L (ref 12–45)
BASOPHILS # BLD AUTO: 0.2 % (ref 0–1.8)
BASOPHILS # BLD: 0.03 K/UL (ref 0–0.12)
BILIRUB SERPL-MCNC: 1.2 MG/DL (ref 0.1–1.5)
BUN SERPL-MCNC: 13 MG/DL (ref 8–22)
CALCIUM SERPL-MCNC: 8.6 MG/DL (ref 8.5–10.5)
CHLORIDE SERPL-SCNC: 103 MMOL/L (ref 96–112)
CHOLEST SERPL-MCNC: 106 MG/DL (ref 100–199)
CO2 SERPL-SCNC: 26 MMOL/L (ref 20–33)
CREAT SERPL-MCNC: 0.62 MG/DL (ref 0.5–1.4)
EOSINOPHIL # BLD AUTO: 0.51 K/UL (ref 0–0.51)
EOSINOPHIL NFR BLD: 3.3 % (ref 0–6.9)
ERYTHROCYTE [DISTWIDTH] IN BLOOD BY AUTOMATED COUNT: 48 FL (ref 35.9–50)
GLOBULIN SER CALC-MCNC: 3 G/DL (ref 1.9–3.5)
GLUCOSE BLD-MCNC: 108 MG/DL (ref 65–99)
GLUCOSE BLD-MCNC: 112 MG/DL (ref 65–99)
GLUCOSE BLD-MCNC: 141 MG/DL (ref 65–99)
GLUCOSE SERPL-MCNC: 121 MG/DL (ref 65–99)
HCT VFR BLD AUTO: 39.1 % (ref 37–47)
HDLC SERPL-MCNC: 42 MG/DL
HGB BLD-MCNC: 12.9 G/DL (ref 12–16)
IMM GRANULOCYTES # BLD AUTO: 0.09 K/UL (ref 0–0.11)
IMM GRANULOCYTES NFR BLD AUTO: 0.6 % (ref 0–0.9)
LDLC SERPL CALC-MCNC: 50 MG/DL
LYMPHOCYTES # BLD AUTO: 2.16 K/UL (ref 1–4.8)
LYMPHOCYTES NFR BLD: 14.1 % (ref 22–41)
MAGNESIUM SERPL-MCNC: 1.7 MG/DL (ref 1.5–2.5)
MCH RBC QN AUTO: 31.7 PG (ref 27–33)
MCHC RBC AUTO-ENTMCNC: 33 G/DL (ref 33.6–35)
MCV RBC AUTO: 96.1 FL (ref 81.4–97.8)
MONOCYTES # BLD AUTO: 0.68 K/UL (ref 0–0.85)
MONOCYTES NFR BLD AUTO: 4.4 % (ref 0–13.4)
NEUTROPHILS # BLD AUTO: 11.82 K/UL (ref 2–7.15)
NEUTROPHILS NFR BLD: 77.4 % (ref 44–72)
NRBC # BLD AUTO: 0 K/UL
NRBC BLD-RTO: 0 /100 WBC
PLATELET # BLD AUTO: 301 K/UL (ref 164–446)
PMV BLD AUTO: 10.1 FL (ref 9–12.9)
POTASSIUM SERPL-SCNC: 3.6 MMOL/L (ref 3.6–5.5)
PROT SERPL-MCNC: 5.6 G/DL (ref 6–8.2)
RBC # BLD AUTO: 4.07 M/UL (ref 4.2–5.4)
SODIUM SERPL-SCNC: 140 MMOL/L (ref 135–145)
TRIGL SERPL-MCNC: 68 MG/DL (ref 0–149)
WBC # BLD AUTO: 15.3 K/UL (ref 4.8–10.8)

## 2021-11-18 PROCEDURE — 94760 N-INVAS EAR/PLS OXIMETRY 1: CPT

## 2021-11-18 PROCEDURE — 82962 GLUCOSE BLOOD TEST: CPT | Mod: 91

## 2021-11-18 PROCEDURE — 700102 HCHG RX REV CODE 250 W/ 637 OVERRIDE(OP)

## 2021-11-18 PROCEDURE — 700111 HCHG RX REV CODE 636 W/ 250 OVERRIDE (IP)

## 2021-11-18 PROCEDURE — 80053 COMPREHEN METABOLIC PANEL: CPT

## 2021-11-18 PROCEDURE — 700101 HCHG RX REV CODE 250: Performed by: STUDENT IN AN ORGANIZED HEALTH CARE EDUCATION/TRAINING PROGRAM

## 2021-11-18 PROCEDURE — 700111 HCHG RX REV CODE 636 W/ 250 OVERRIDE (IP): Performed by: STUDENT IN AN ORGANIZED HEALTH CARE EDUCATION/TRAINING PROGRAM

## 2021-11-18 PROCEDURE — 770020 HCHG ROOM/CARE - TELE (206)

## 2021-11-18 PROCEDURE — 700102 HCHG RX REV CODE 250 W/ 637 OVERRIDE(OP): Performed by: STUDENT IN AN ORGANIZED HEALTH CARE EDUCATION/TRAINING PROGRAM

## 2021-11-18 PROCEDURE — A9270 NON-COVERED ITEM OR SERVICE: HCPCS

## 2021-11-18 PROCEDURE — A9270 NON-COVERED ITEM OR SERVICE: HCPCS | Performed by: STUDENT IN AN ORGANIZED HEALTH CARE EDUCATION/TRAINING PROGRAM

## 2021-11-18 PROCEDURE — 36415 COLL VENOUS BLD VENIPUNCTURE: CPT

## 2021-11-18 PROCEDURE — 700105 HCHG RX REV CODE 258: Performed by: STUDENT IN AN ORGANIZED HEALTH CARE EDUCATION/TRAINING PROGRAM

## 2021-11-18 PROCEDURE — 85025 COMPLETE CBC W/AUTO DIFF WBC: CPT

## 2021-11-18 PROCEDURE — 92526 ORAL FUNCTION THERAPY: CPT

## 2021-11-18 PROCEDURE — 80061 LIPID PANEL: CPT

## 2021-11-18 PROCEDURE — 97535 SELF CARE MNGMENT TRAINING: CPT

## 2021-11-18 PROCEDURE — 83735 ASSAY OF MAGNESIUM: CPT

## 2021-11-18 RX ORDER — MAGNESIUM SULFATE HEPTAHYDRATE 40 MG/ML
2 INJECTION, SOLUTION INTRAVENOUS ONCE
Status: COMPLETED | OUTPATIENT
Start: 2021-11-18 | End: 2021-11-18

## 2021-11-18 RX ORDER — METOPROLOL TARTRATE 1 MG/ML
5 INJECTION, SOLUTION INTRAVENOUS
Status: DISCONTINUED | OUTPATIENT
Start: 2021-11-18 | End: 2021-11-30 | Stop reason: HOSPADM

## 2021-11-18 RX ADMIN — MAGNESIUM SULFATE HEPTAHYDRATE 2 G: 40 INJECTION, SOLUTION INTRAVENOUS at 11:16

## 2021-11-18 RX ADMIN — ENOXAPARIN SODIUM 40 MG: 40 INJECTION SUBCUTANEOUS at 05:14

## 2021-11-18 RX ADMIN — METOPROLOL TARTRATE 75 MG: 25 TABLET, FILM COATED ORAL at 16:10

## 2021-11-18 RX ADMIN — SODIUM CHLORIDE, POTASSIUM CHLORIDE, SODIUM LACTATE AND CALCIUM CHLORIDE 1000 ML: 600; 310; 30; 20 INJECTION, SOLUTION INTRAVENOUS at 03:12

## 2021-11-18 RX ADMIN — ATORVASTATIN CALCIUM 80 MG: 80 TABLET, FILM COATED ORAL at 16:17

## 2021-11-18 RX ADMIN — MULTIPLE VITAMINS W/ MINERALS TAB 1 TABLET: TAB at 05:12

## 2021-11-18 RX ADMIN — METOPROLOL TARTRATE 50 MG: 50 TABLET, FILM COATED ORAL at 05:13

## 2021-11-18 RX ADMIN — METOPROLOL TARTRATE 5 MG: 5 INJECTION INTRAVENOUS at 11:16

## 2021-11-18 RX ADMIN — Medication 100 MG: at 06:00

## 2021-11-18 RX ADMIN — METOPROLOL TARTRATE 5 MG: 5 INJECTION INTRAVENOUS at 00:47

## 2021-11-18 ASSESSMENT — COGNITIVE AND FUNCTIONAL STATUS - GENERAL
PERSONAL GROOMING: A LOT
EATING MEALS: A LOT
HELP NEEDED FOR BATHING: A LOT
DAILY ACTIVITIY SCORE: 10
SUGGESTED CMS G CODE MODIFIER DAILY ACTIVITY: CL
TOILETING: TOTAL
DRESSING REGULAR LOWER BODY CLOTHING: TOTAL
DRESSING REGULAR UPPER BODY CLOTHING: A LOT

## 2021-11-18 ASSESSMENT — FIBROSIS 4 INDEX: FIB4 SCORE: 0.7

## 2021-11-18 NOTE — CARE PLAN
Problem: Nutritional:  Goal: Achieve adequate nutritional intake  Description: Advance diet when medically feasible. Patient will consume >50% of meals  Outcome: Progressing    PO intake improved from 11/17 (0 - <25%x 3) to 25-50% breakfast today; pt noted to be more awake/alert.     PO intake over past 48 hrs averaging <50% of nutritional needs per ADLs. Per Poor PO Intake Protocol, order thickened boost glucose control TID with meals to bolster nutritional intake.

## 2021-11-18 NOTE — THERAPY
"Occupational Therapy  Daily Treatment     Patient Name: Viki García  Age:  86 y.o., Sex:  female  Medical Record #: [id number]  Today's Date: 11/18/2021     Precautions  Precautions: Fall Risk,Swallow Precautions ( See Comments)  Comments: confused; reports doesn't know who she is    Assessment    Pt seen for OT session. Cognition slightly improved, but pt continues to be very confused and tangential req redirection; poor initiation with R hand req [place] to initiate grooming and reacing forward. Pt has decreased memory and retention of safety cues req max v/cs. Pt doesn't know who she is or where she is; later on yelling out asking for her purse/'s license, attempted to explain pt safe, purse likely not here, and use of call light, but unclear if pt has any retention for education. Req max A for STS; pt resisted stand. Then refused to return to supine after session req max A; wrist restraints re-donned. Continues to be limited by decreased functional mobility, activity tolerance, cognition, strength, AROM, coordination, balance, and pain which are currently affecting pt's ability to complete ADLs/IADLs at baseline. Will continue to follow.     Plan    Continue current treatment plan.    DC Equipment Recommendations: Unable to determine at this time  Discharge Recommendations: Recommend post-acute placement for additional occupational therapy services prior to discharge home    Subjective    \"Does anyone out there know Viki?\" and \"Are you a taxi or a ?\"     Objective     11/18/21 0936   Precautions   Precautions Fall Risk;Swallow Precautions ( See Comments)   Comments confused; reports doesn't know who she is   Vitals   O2 Delivery Device None - Room Air   Pain 0 - 10 Group   Location   (s/s of pain but unable to specify where)   Therapist Pain Assessment Post Activity;During Activity;Nurse Notified  (unable to quantify)   Cognition    Cognition / Consciousness X   Speech/ Communication Delayed " "Responses;Slurred;Incomprehensible Words  ([place] accent)   Orientation Level Not Oriented to Name;Not Oriented to Reason;Not Oriented to Place   Level of Consciousness Alert   Ability To Follow Commands Unable to Follow 1 Step Commands  (about 50% of the time)   Safety Awareness Impaired;Impulsive   New Learning Impaired   Attention Impaired   Sequencing Impaired   Initiation Impaired   Comments able to understand more of speech, but poor command following especially on L. Asked who she was, if anyone outside knew who she was, and if therapist was a \"\"   Passive ROM Upper Body   Passive ROM Upper Body WDL   Active ROM Upper Body   Active ROM Upper Body  X   Comments unable to fully test d/t poor command following   Strength Upper Body   Upper Body Strength  X   Comments R weaker than L; unable to fully test d/t poor command following   Sensation Upper Body   Comments unable to fully test d/t poor command following   Neuro-Muscular Treatments   Neuro-Muscular Treatments Verbal Cuing;Anterior weight shift;Tactile Cuing;Sequencing;Facilitation;Postural Facilitation   Comments EOB sitting balance; attempted stand but pt resisted with heavy posterior lean   Other Treatments   Other Treatments Provided Pt continues to be very confused and tangential req redirection. poor initiation with R hand req [place] to initiate to bring cloth to face or reach forward. decreased memory and retention of safety cueing   Balance   Sitting Balance (Static) Poor +   Sitting Balance (Dynamic) Poor   Standing Balance (Static) Trace   Standing Balance (Dynamic) Dependent   Weight Shift Sitting Poor   Weight Shift Standing Poor   Skilled Intervention Verbal Cuing;Tactile Cuing;Compensatory Strategies;Facilitation;Sequencing;Postural Facilitation   Comments HHA and max A for STS; resistant to stand. intermittent min A at EOB and BUE support   Bed Mobility    Supine to Sit Moderate Assist   Sit to Supine Maximal Assist  (mostly d/t " cognition)   Scooting Moderate Assist   Rolling Maximum Assist to Lt.   Skilled Intervention Verbal Cuing;Tactile Cuing;Facilitation;Compensatory Strategies   Comments HOB elevated; unable to sequence   Activities of Daily Living   Grooming Moderate Assist;Seated  (wiping face; [place] [employer] [place] for initiation/thoroughness)   Lower Body Dressing Total Assist   Toileting Total Assist  (purewick and incontinence)   Skilled Intervention Verbal Cuing;Tactile Cuing;Sequencing;Facilitation;Compensatory Strategies   How much help from another person does the patient currently need...   Putting on and taking off regular lower body clothing? 1   Bathing (including washing, rinsing, and drying)? 2   Toileting, which includes using a toilet, bedpan, or urinal? 1   Putting on and taking off regular upper body clothing? 2   Taking care of personal grooming such as brushing teeth? 2   Eating meals? 2   6 Clicks Daily Activity Score 10   Functional Mobility   Sit to Stand Maximal Assist  (resisted stand)   Bed, Chair, Wheelchair Transfer Unable to Participate   Toilet Transfers Unable to Participate   Mobility EOB and STS   Skilled Intervention Verbal Cuing;Tactile Cuing;Sequencing;Postural Facilitation;Facilitation;Compensatory Strategies   Visual Perception   Comments unable to fully test   Activity Tolerance   Comments limited by confusion and fatigue   Patient / Family Goals   Patient / Family Goal #1 unable to state   Short Term Goals   Short Term Goal # 1 seated g/h with min A   Goal Outcome # 1 Progressing slower than expected   Short Term Goal # 2 sit unsupported at EOB for >3 min in prep for LB dressing   Goal Outcome # 2 Progressing as expected   Short Term Goal # 3 BSC txf with mod A   Goal Outcome # 3 Progressing slower than expected   Education Group   Education Provided Role of Occupational Therapist   Role of Occupational Therapist Patient Response Patient;Nonacceptance;Explanation;Reinforcement Needed;No Learning Evidence

## 2021-11-18 NOTE — CARE PLAN
Problem: Knowledge Deficit - Standard  Goal: Patient and family/care givers will demonstrate understanding of plan of care, disease process/condition, diagnostic tests and medications  Outcome: Not Progressing     Problem: Risk for Aspiration  Goal: Patient's risk for aspiration will be absent or decrease  Outcome: Progressing   The patient is Stable - Low risk of patient condition declining or worsening    Shift Goals  Clinical Goals: Control HR, restraints  Patient Goals: YAMILETH  Family Goals: N/A    Progress made toward(s) clinical / shift goals:  Control HR    Patient is not progressing towards the following goals:      Problem: Knowledge Deficit - Standard  Goal: Patient and family/care givers will demonstrate understanding of plan of care, disease process/condition, diagnostic tests and medications  Outcome: Not Progressing

## 2021-11-18 NOTE — DISCHARGE PLANNING
Agency/Facility Name: [facility]  Spoke To: Joaquina   Outcome: Admissions concerned about Pt discharge plan, requesting more Pt support before acceptance.     0940-  Agency/Facility Name: [facility]  Spoke To: Joaquina  Outcome: DPA reviewed Pt plan to discharge to [facility] after therapies at SNF, Joaquina requesting proof of Pt acceptance to [facility].    RN CM notified.     1007-  Agency/Facility Name: [facility]  Spoke To: Marilou  Outcome: Admissions still concerned Pt is restrained, as well as pending acceptance at [facility]. DPA to verify acceptance to [facility] once representative has stopped by room.     1027-  DPA faxed [place] response to [facility] for records.    1030-  DPA faxed SNF Liaison Joaquina [facility] response for SNF records, Pt may not be accepted for 72 hours.    RN CM notified.

## 2021-11-18 NOTE — PROGRESS NOTES
Bedside report received. Bed locked and in low position, call light within reach. Hourly rounding in place. No further needs at this time. Bilateral wrist restraints in place, Q2 turns in place. Pt A&O to self.

## 2021-11-19 ENCOUNTER — APPOINTMENT (OUTPATIENT)
Dept: RADIOLOGY | Facility: MEDICAL CENTER | Age: 86
DRG: 057 | End: 2021-11-19
Payer: MEDICARE

## 2021-11-19 LAB
ALBUMIN SERPL BCP-MCNC: 2.6 G/DL (ref 3.2–4.9)
ALBUMIN/GLOB SERPL: 0.9 G/DL
ALP SERPL-CCNC: 98 U/L (ref 30–99)
ALT SERPL-CCNC: 17 U/L (ref 2–50)
ANION GAP SERPL CALC-SCNC: 11 MMOL/L (ref 7–16)
ANION GAP SERPL CALC-SCNC: 9 MMOL/L (ref 7–16)
AST SERPL-CCNC: 14 U/L (ref 12–45)
BASOPHILS # BLD AUTO: 0.3 % (ref 0–1.8)
BASOPHILS # BLD: 0.03 K/UL (ref 0–0.12)
BILIRUB SERPL-MCNC: 0.5 MG/DL (ref 0.1–1.5)
BUN SERPL-MCNC: 18 MG/DL (ref 8–22)
BUN SERPL-MCNC: 20 MG/DL (ref 8–22)
CALCIUM SERPL-MCNC: 8.6 MG/DL (ref 8.5–10.5)
CALCIUM SERPL-MCNC: 8.6 MG/DL (ref 8.5–10.5)
CHLORIDE SERPL-SCNC: 104 MMOL/L (ref 96–112)
CHLORIDE SERPL-SCNC: 108 MMOL/L (ref 96–112)
CO2 SERPL-SCNC: 24 MMOL/L (ref 20–33)
CO2 SERPL-SCNC: 26 MMOL/L (ref 20–33)
CREAT SERPL-MCNC: 0.61 MG/DL (ref 0.5–1.4)
CREAT SERPL-MCNC: 0.63 MG/DL (ref 0.5–1.4)
EOSINOPHIL # BLD AUTO: 0.89 K/UL (ref 0–0.51)
EOSINOPHIL NFR BLD: 7.5 % (ref 0–6.9)
ERYTHROCYTE [DISTWIDTH] IN BLOOD BY AUTOMATED COUNT: 49.3 FL (ref 35.9–50)
GLOBULIN SER CALC-MCNC: 2.8 G/DL (ref 1.9–3.5)
GLUCOSE BLD-MCNC: 100 MG/DL (ref 65–99)
GLUCOSE BLD-MCNC: 121 MG/DL (ref 65–99)
GLUCOSE BLD-MCNC: 146 MG/DL (ref 65–99)
GLUCOSE BLD-MCNC: 97 MG/DL (ref 65–99)
GLUCOSE SERPL-MCNC: 131 MG/DL (ref 65–99)
GLUCOSE SERPL-MCNC: 134 MG/DL (ref 65–99)
HCT VFR BLD AUTO: 38.8 % (ref 37–47)
HGB BLD-MCNC: 12.5 G/DL (ref 12–16)
IMM GRANULOCYTES # BLD AUTO: 0.05 K/UL (ref 0–0.11)
IMM GRANULOCYTES NFR BLD AUTO: 0.4 % (ref 0–0.9)
LYMPHOCYTES # BLD AUTO: 2.28 K/UL (ref 1–4.8)
LYMPHOCYTES NFR BLD: 19.2 % (ref 22–41)
MAGNESIUM SERPL-MCNC: 2.3 MG/DL (ref 1.5–2.5)
MCH RBC QN AUTO: 31.6 PG (ref 27–33)
MCHC RBC AUTO-ENTMCNC: 32.2 G/DL (ref 33.6–35)
MCV RBC AUTO: 98 FL (ref 81.4–97.8)
MONOCYTES # BLD AUTO: 0.7 K/UL (ref 0–0.85)
MONOCYTES NFR BLD AUTO: 5.9 % (ref 0–13.4)
NEUTROPHILS # BLD AUTO: 7.91 K/UL (ref 2–7.15)
NEUTROPHILS NFR BLD: 66.7 % (ref 44–72)
NRBC # BLD AUTO: 0 K/UL
NRBC BLD-RTO: 0 /100 WBC
PLATELET # BLD AUTO: 297 K/UL (ref 164–446)
PMV BLD AUTO: 10.3 FL (ref 9–12.9)
POTASSIUM SERPL-SCNC: 3.1 MMOL/L (ref 3.6–5.5)
POTASSIUM SERPL-SCNC: 4 MMOL/L (ref 3.6–5.5)
PROT SERPL-MCNC: 5.4 G/DL (ref 6–8.2)
RBC # BLD AUTO: 3.96 M/UL (ref 4.2–5.4)
SODIUM SERPL-SCNC: 139 MMOL/L (ref 135–145)
SODIUM SERPL-SCNC: 143 MMOL/L (ref 135–145)
WBC # BLD AUTO: 11.9 K/UL (ref 4.8–10.8)

## 2021-11-19 PROCEDURE — 97112 NEUROMUSCULAR REEDUCATION: CPT

## 2021-11-19 PROCEDURE — 83735 ASSAY OF MAGNESIUM: CPT

## 2021-11-19 PROCEDURE — A9270 NON-COVERED ITEM OR SERVICE: HCPCS | Performed by: STUDENT IN AN ORGANIZED HEALTH CARE EDUCATION/TRAINING PROGRAM

## 2021-11-19 PROCEDURE — 82962 GLUCOSE BLOOD TEST: CPT | Mod: 91

## 2021-11-19 PROCEDURE — 80048 BASIC METABOLIC PNL TOTAL CA: CPT

## 2021-11-19 PROCEDURE — 97530 THERAPEUTIC ACTIVITIES: CPT

## 2021-11-19 PROCEDURE — 92526 ORAL FUNCTION THERAPY: CPT

## 2021-11-19 PROCEDURE — 700111 HCHG RX REV CODE 636 W/ 250 OVERRIDE (IP): Performed by: STUDENT IN AN ORGANIZED HEALTH CARE EDUCATION/TRAINING PROGRAM

## 2021-11-19 PROCEDURE — 700102 HCHG RX REV CODE 250 W/ 637 OVERRIDE(OP)

## 2021-11-19 PROCEDURE — 70450 CT HEAD/BRAIN W/O DYE: CPT | Mod: MG

## 2021-11-19 PROCEDURE — 36415 COLL VENOUS BLD VENIPUNCTURE: CPT

## 2021-11-19 PROCEDURE — 700102 HCHG RX REV CODE 250 W/ 637 OVERRIDE(OP): Performed by: STUDENT IN AN ORGANIZED HEALTH CARE EDUCATION/TRAINING PROGRAM

## 2021-11-19 PROCEDURE — A9270 NON-COVERED ITEM OR SERVICE: HCPCS

## 2021-11-19 PROCEDURE — 80053 COMPREHEN METABOLIC PANEL: CPT

## 2021-11-19 PROCEDURE — 770020 HCHG ROOM/CARE - TELE (206)

## 2021-11-19 PROCEDURE — 85025 COMPLETE CBC W/AUTO DIFF WBC: CPT

## 2021-11-19 RX ORDER — POTASSIUM CHLORIDE 7.45 MG/ML
10 INJECTION INTRAVENOUS
Status: COMPLETED | OUTPATIENT
Start: 2021-11-19 | End: 2021-11-19

## 2021-11-19 RX ORDER — QUETIAPINE FUMARATE 25 MG/1
12.5 TABLET, FILM COATED ORAL ONCE
Status: COMPLETED | OUTPATIENT
Start: 2021-11-19 | End: 2021-11-19

## 2021-11-19 RX ADMIN — MULTIPLE VITAMINS W/ MINERALS TAB 1 TABLET: TAB at 10:42

## 2021-11-19 RX ADMIN — POTASSIUM CHLORIDE 10 MEQ: 10 INJECTION, SOLUTION INTRAVENOUS at 11:00

## 2021-11-19 RX ADMIN — APIXABAN 2.5 MG: 5 TABLET, FILM COATED ORAL at 20:05

## 2021-11-19 RX ADMIN — ATORVASTATIN CALCIUM 80 MG: 80 TABLET, FILM COATED ORAL at 17:05

## 2021-11-19 RX ADMIN — QUETIAPINE 12.5 MG: 25 TABLET, FILM COATED ORAL at 17:00

## 2021-11-19 RX ADMIN — METOPROLOL TARTRATE 75 MG: 50 TABLET, FILM COATED ORAL at 10:41

## 2021-11-19 RX ADMIN — QUETIAPINE FUMARATE 25 MG: 25 TABLET ORAL at 01:30

## 2021-11-19 RX ADMIN — Medication 100 MG: at 10:49

## 2021-11-19 RX ADMIN — ENOXAPARIN SODIUM 40 MG: 40 INJECTION SUBCUTANEOUS at 05:16

## 2021-11-19 RX ADMIN — POTASSIUM CHLORIDE 10 MEQ: 10 INJECTION, SOLUTION INTRAVENOUS at 10:00

## 2021-11-19 RX ADMIN — METOPROLOL TARTRATE 75 MG: 50 TABLET, FILM COATED ORAL at 17:05

## 2021-11-19 RX ADMIN — POTASSIUM CHLORIDE 10 MEQ: 10 INJECTION, SOLUTION INTRAVENOUS at 10:43

## 2021-11-19 RX ADMIN — DOCUSATE SODIUM 50 MG AND SENNOSIDES 8.6 MG 2 TABLET: 8.6; 5 TABLET, FILM COATED ORAL at 17:05

## 2021-11-19 RX ADMIN — POTASSIUM CHLORIDE 10 MEQ: 10 INJECTION, SOLUTION INTRAVENOUS at 12:26

## 2021-11-19 RX ADMIN — DOCUSATE SODIUM 50 MG AND SENNOSIDES 8.6 MG 2 TABLET: 8.6; 5 TABLET, FILM COATED ORAL at 10:42

## 2021-11-19 ASSESSMENT — CHA2DS2 SCORE
CHA2DS2 VASC SCORE: 5
DIABETES: YES
SEX: FEMALE
AGE 75 OR GREATER: YES
HYPERTENSION: YES
AGE 65 TO 74: NO
VASCULAR DISEASE: NO
CHF OR LEFT VENTRICULAR DYSFUNCTION: NO
PRIOR STROKE OR TIA OR THROMBOEMBOLISM: NO

## 2021-11-19 ASSESSMENT — COGNITIVE AND FUNCTIONAL STATUS - GENERAL
MOVING FROM LYING ON BACK TO SITTING ON SIDE OF FLAT BED: UNABLE
CLIMB 3 TO 5 STEPS WITH RAILING: TOTAL
TURNING FROM BACK TO SIDE WHILE IN FLAT BAD: UNABLE
SUGGESTED CMS G CODE MODIFIER MOBILITY: CN
WALKING IN HOSPITAL ROOM: TOTAL
STANDING UP FROM CHAIR USING ARMS: TOTAL
MOVING TO AND FROM BED TO CHAIR: UNABLE
MOBILITY SCORE: 6

## 2021-11-19 ASSESSMENT — FIBROSIS 4 INDEX: FIB4 SCORE: 0.98

## 2021-11-19 ASSESSMENT — GAIT ASSESSMENTS: GAIT LEVEL OF ASSIST: UNABLE TO PARTICIPATE

## 2021-11-19 NOTE — PROGRESS NOTES
Daily Progress Note:     Date of Service: 11/19/2021  Primary Team: [facility] Blue Team   Attending: Dr. Dooley.   Senior Resident: Dr. Nolen   Intern: Dr. Arreaga   Contact:  [phone]    Chief Complaint:   AMS, Hypernatremia     ID: Patient is an 86-year-old female with a significant history of baseline dementia(A&O x1), who was admitted on 11/12 for altered mental status. Developed Afib RVR while hospitalized.     Overnight: Upgraded diet.  Discontinued lactated Ringer due to p.o. oral intake.  Seroquel given for CT of head, patient was drowsy.    Interval Hx:  Yesterday A. fib RVR with heart rate in the 130s given first dose of increased metoprolol 75 twice daily at 6 PM, heart rate has since ranged 1 teens to 102.   CT head done this morning no acute intracranial normalities noted, nonspecific changes in the brain most commonly associate with small vessel ischemic disease and moderate cerebral atrophy.  Moderate bilateral ventricular dilation is seen favoring ex vacuo dilation.  Vitals afebrile, pulse of 110s to 104, blood pressure 1 17-1 22 systolic, 95 room air  White blood cell count 11.9 from 15.3 yesterday,  Potassium 3.1 yesterday 3.6, renal function stable. Will supplement potassium.   Pt was given Seraquil for CT scan, has been somnolent and difficult to arouse.       11/16: Midline placed, attempted to call DPOA x2, went to . Will try again later today    Around 4:30 PM, nurse paged about HR in 140s. Patient ia alert, verbal but unable to understand. Ordered STAT EKG, showed Afib with RVR at . Gave her IV metoprolol 5 mg x1, HR went down to 115, SBP at 120s.     Called and spoke with DPOA, she confirmed DNR/DNI, no ICU transfers. Prior to this admission, she was living alone, taking care of her self, A&Ox 1-2, can not engage in conversation, poorly eating. Not on any home medications. The neighbor helps with shopping, groceries, lawn care. DPOA is agreeable to come and meet the team on  Thursday at 1 PM.    Consultants/Specialty:  SLP  PT  OT    Review of Systems:    Review of Systems   Unable to perform ROS: Mental acuity       Objective Data:   Physical Exam:   Vitals:   Temp:  [36.4 °C (97.5 °F)-37.1 °C (98.8 °F)] 36.5 °C (97.7 °F)  Pulse:  [102-135] 104  Resp:  [17-18] 17  BP: (111-144)/(71-89) 117/72  SpO2:  [95 %-97 %] 95 %     PHYSICAL EXAM: Limited because of AMS  Physical Exam  Constitutional:       General: She is sleeping. She is not in acute distress.     Appearance: She is ill-appearing.      Interventions: She is sedated.      Comments: Mostly somnolent, oriented to self  Not able to follow commands   HENT:      Head: Normocephalic and atraumatic.      Nose: Nose normal.      Mouth/Throat:      Mouth: Mucous membranes are moist.   Eyes:      Conjunctiva/sclera: Conjunctivae normal.      Pupils: Pupils are equal, round, and reactive to light.   Cardiovascular:      Rate and Rhythm: Normal rate and regular rhythm.      Pulses: Normal pulses.      Heart sounds: Normal heart sounds.   Pulmonary:      Effort: Pulmonary effort is normal. No respiratory distress.      Breath sounds: Normal breath sounds.   Abdominal:      General: Bowel sounds are normal.      Palpations: Abdomen is soft. There is no mass.      Tenderness: There is no abdominal tenderness.   Musculoskeletal:         General: No swelling or tenderness.   Skin:     General: Skin is warm.      Capillary Refill: Capillary refill takes less than 2 seconds.   Neurological:      Cranial Nerves: Dysarthria present. No facial asymmetry.      Motor: Weakness present. No tremor.      Comments: Unable to perform complete neurological exam because of AMS   Psychiatric:      Comments: Unable to assess       Labs:   Lab Results   Component Value Date/Time    SODIUM 139 11/19/2021 01:48 AM    POTASSIUM 3.1 (L) 11/19/2021 01:48 AM    CHLORIDE 104 11/19/2021 01:48 AM    CO2 26 11/19/2021 01:48 AM    GLUCOSE 134 (H) 11/19/2021 01:48 AM    BUN  18 11/19/2021 01:48 AM    CREATININE 0.63 11/19/2021 01:48 AM    CREATININE 0.7 02/25/2008 06:05 AM    BUNCREATRAT 19 06/20/2016 10:28 AM      Lab Results   Component Value Date/Time    PROTHROMBTM 11.2 02/19/2008 05:30 AM    INR 0.97 02/19/2008 05:30 AM      Lab Results   Component Value Date/Time    WBC 11.9 (H) 11/19/2021 01:48 AM    RBC 3.96 (L) 11/19/2021 01:48 AM    HEMOGLOBIN 12.5 11/19/2021 01:48 AM    HEMATOCRIT 38.8 11/19/2021 01:48 AM    MCV 98.0 (H) 11/19/2021 01:48 AM    MCH 31.6 11/19/2021 01:48 AM    MCHC 32.2 (L) 11/19/2021 01:48 AM    MPV 10.3 11/19/2021 01:48 AM    NEUTSPOLYS 66.70 11/19/2021 01:48 AM    LYMPHOCYTES 19.20 (L) 11/19/2021 01:48 AM    MONOCYTES 5.90 11/19/2021 01:48 AM    EOSINOPHILS 7.50 (H) 11/19/2021 01:48 AM    BASOPHILS 0.30 11/19/2021 01:48 AM        Imaging:   CT-HEAD W/O   Final Result         1.  No acute intracranial abnormality is identified, nonspecific changes, most commonly associated with small vessel ischemic disease. Associated moderate cerebral atrophy.   2.  Atherosclerosis.         DX-SKULL-LIMITED 3-   Final Result      Postsurgical change in the cervical spine. Otherwise no evidence of metallic foreign body.      DX-PELVIS-1 OR 2 VIEWS   Final Result      No radiopaque foreign body identified.      DX-CHEST-LIMITED (1 VIEW)   Final Result         Enlarged cardiac silhouette without evidence of acute disease.      No pacemaker.      [id number] VIEW   Final Result      No radiopaque foreign body identified.      IR-US GUIDED PIV   Final Result    Ultrasound-guided PERIPHERAL IV INSERTION performed by    qualified nursing staff as above.      EC-ECHOCARDIOGRAM COMPLETE W/O CONT   Final Result      DX-CHEST-PORTABLE (1 VIEW)   Final Result      Limited exam showing no acute cardiopulmonary disease.      CT-HEAD W/O   Final Result      1.  Extensive atrophy and white matter changes.   2.  No acute intracranial hemorrhage or territorial infarct.   3.  Small chronic  LEFT frontal infarct.        Rx:  Lovenox 40, V fluids discontinued due to p.o.intake  Metroprolol 75 mg twice daily; 5 mg IV as needed  KCl 10 mEq IV  Insulin sliding scale  Seroquel 25 as needed      Problem Representation:   # Acute Encephalopathy  -H/o baseline Dementia worsening for the past 6 months  -Longterm alcohol use with possible wernicke's encephalopathy   -Hypernatemia on admission  -BUN, CPK elevated on exam, possible dehydration and uremia worsening the dementia  -CT head- no hematoma, signs of vascular dementia  -B12/Folate, TSH WNL  -CXR and U/A unremarkable, BC - pending  -Monitor labs  -PT, OT, SLP evaluation  -Continue high dose Thiamine, Folate, Multivitamin Supplementation  -Continue IVF for now  -On soft restraints  Ordering xray abdomen, head, chest for evaluation of foreign body for MRI head for stroke workup.    Repeat CT head ordered for stroke work-up was unchanged from prior.   Will obtain MRI Brain today.     # New onset Atrial fibrillation with RVR  -IV metoprolol PRN   -Creased on Metoprolol 50m to 75 g BID, will titrate as needed  DPOA was counseled risks as patient has CHADSVAS 7, stroke risk of 15.7% a year  HASBLED score of 3, risk of bleed is 5% a year.   Spoke to DPOA, spoke about risks of anticuagulation of bleeding as pt has hx of falls. vs throbusus. DPOA was counseled on risks vs therapeutic effects of anticoagulation. Agrees with plan to start Anticuagulation with risks of stroke.   Apixaban 5mg BID.     Will obtain MRI Brain today.       #Hypertensive urgency with h/o Hypertension  -Held amlodipine 10mg OD as she was started on metoprolol  -PRN labetolol and enalapril with parameters    # Hypernatremia  -Resolved    # Hypokalemia  -Monitor and replete PRN    # Pre-diabetes  -HbA1C 6.1 in 11/2021  -On ISS, accucheks for hyperglycemia with hypoglycemia protocol    # Leukocytosis  -11/16: WBC at 14.8, on admission at 16.7  -Likely reactive    #Lactic  acidosis  -Resolved  -Possible dehydration    # Debility  # Severe protein calorie malnutrition  -Dietary consult  -Continue thiamine and MVT  -Discuss with DPOA on tube feeds    # Chronic alcohol use  -No signs of withdrawal      CODE: DNR/DNI  DVT Prophylaxis: Lovenox  DISPO: AMS, not medically stable, poor prognosis

## 2021-11-19 NOTE — THERAPY
"Missed Therapy     Patient Name: Viki García  Age:  86 y.o., Sex:  female  Medical Record #: [id number]  Today's Date: 11/19/2021    Attempted to see patient for OT treatment. Difficult to wake patient, once she finally was awake, she declined therapy 2/2 \"not feeling good.\" Will re-attempt as able.      11/19/21 1245   Session Information   Date / Session Number  11/19 attempted, pt refused. 11/18 #2 (2/3 ; 11/21)         "

## 2021-11-19 NOTE — THERAPY
Speech Language Pathology  Daily Treatment     Patient Name: Viki García  Age:  86 y.o., Sex:  female  Medical Record #: [id number]  Today's Date: 11/18/2021     Precautions: Fall Risk,Swallow Precautions ( See Comments)  Comments: confused; reports doesn't know who she is    Assessment    Patient was seen on this date for dysphagia treatment. Patient awake, confused, and perseverated on going to the bathroom despite multiple attempts to educate regarding purewick and redirect to task.     PO trials consisted of soft solids, MTL (cup/straw) and thin liquids (cup/straw). Patient presented with no overt s/sx of aspiration with trials of soft solids and MTL. Patient had throat clear x1 and delayed weak coughing response to trials thins via straw. Otherwise, no s/sx of aspiration. Mastication functional with soft solids. Patient required direct assistance with feeding given reduced coordination.    Recommendations  1. Upgrade to a soft & bite size and mildly thick liquid diet               -1:1 feeding  2. Meds whole with MTL wash  3. OK for single sips of un-thickened water  4. Will consider a diagnostic swallow study with any ongoing concerns for aspiration    Plan    Continue current treatment plan.    Discharge Recommendations: Recommend post-acute placement for additional speech therapy services prior to discharge home     Objective       11/18/21 1550   Vitals   O2 Delivery Device None - Room Air   Dysphagia    Positioning / Behavior Modification Modulate Rate or Bite Size   Other Treatments PO trials of soft solids and thin liquids    Diet / Liquid Recommendation Soft & Bite-Sized (6) - (Dysphagia III);Mildly Thick (2) - (Nectar Thick)  (OK for sips of un-thickened )   Skilled Intervention Compensatory Strategies;Verbal Cueing   Short Term Goals   Short Term Goal # 1 B  Patient will consume a PU4/MT2 diet with no overt s/sx of aspiratio given 1:1 feeding.    Goal Outcome  # 1 B Goal met   Short Term Goal # 2  NEW 11/18/21: Patient will consume a SB6/MT2 diet with no overt s/sx of aspiration given 1:1 feeding.    Short Term Goal # 3 Patient will consume thin liquids with no overt s/sx of aspiration.

## 2021-11-19 NOTE — PROGRESS NOTES
Daily Progress Note:     Date of Service: 11/18/2021  Primary Team: [facility]   Attending: Dr. Dooley.   Senior Resident: Dr. Nolen   Intern: Dr. Arreaga   Contact:  [phone]    Chief Complaint:   AMS, Hypernatremia     ID: Patient is an 86-year-old female with a significant history of baseline dementia(A&O x1), who was admitted on 11/12 for altered mental status. Developed Afib RVR while hospitalized.     Overnight: No acute events overnight    Interval Hx:  Spoke with patient's DPOA today, states patient patient has been progressively deteriorating cognition, but has acutely decompensated mentation and cognitive function. Patient was unable to state the name of neighbor today,, neighbor states this is not indicative of baseline mentation. neighbor states they found patient on the ground, unable to move her legs. As far as mobility, at baseline patient is unstable, but able to walk around the house.   On evaluation today patient was much more alert today, making conversation with myself and nurses.  Patient was in the shower in the morning.  Patient continues to be AOA x1 to self, able to follow simple commands.  Notable weakness in upper and lower extremities, but able to move all extremities actively.        11/16: Midline placed, attempted to call DPOA x2, went to . Will try again later today    Around 4:30 PM, nurse paged about HR in 140s. Patient ia alert, verbal but unable to understand. Ordered STAT EKG, showed Afib with RVR at . Gave her IV metoprolol 5 mg x1, HR went down to 115, SBP at 120s.     Called and spoke with MILA, she confirmed DNR/DNI, no ICU transfers. Prior to this admission, she was living alone, taking care of her self, A&Ox 1-2, can not engage in conversation, poorly eating. Not on any home medications. The neighbor helps with shopping, groceries, lawn care. MILA is agreeable to come and meet the team on Thursday at 1  PM.    Consultants/Specialty:  SLP  PT  OT    Review of Systems:    Review of Systems   Unable to perform ROS: Mental acuity       Objective Data:   Physical Exam:   Vitals:   Temp:  [36.2 °C (97.2 °F)-37.1 °C (98.8 °F)] 37.1 °C (98.8 °F)  Pulse:  [104-135] 135  Resp:  [16-20] 17  BP: (114-144)/(74-89) 144/86  SpO2:  [92 %-97 %] 95 %     PHYSICAL EXAM: Limited because of AMS  Physical Exam  Constitutional:       General: She is sleeping. She is not in acute distress.     Appearance: She is ill-appearing.      Interventions: She is sedated.      Comments: Mostly somnolent, oriented to self  Not able to follow commands   HENT:      Head: Normocephalic and atraumatic.      Nose: Nose normal.      Mouth/Throat:      Mouth: Mucous membranes are moist.   Eyes:      Conjunctiva/sclera: Conjunctivae normal.      Pupils: Pupils are equal, round, and reactive to light.   Cardiovascular:      Rate and Rhythm: Normal rate and regular rhythm.      Pulses: Normal pulses.      Heart sounds: Normal heart sounds.   Pulmonary:      Effort: Pulmonary effort is normal. No respiratory distress.      Breath sounds: Normal breath sounds.   Abdominal:      General: Bowel sounds are normal.      Palpations: Abdomen is soft. There is no mass.      Tenderness: There is no abdominal tenderness.   Musculoskeletal:         General: No swelling or tenderness.   Skin:     General: Skin is warm.      Capillary Refill: Capillary refill takes less than 2 seconds.   Neurological:      Cranial Nerves: Dysarthria present. No facial asymmetry.      Motor: Weakness present. No tremor.      Comments: Unable to perform complete neurological exam because of AMS   Psychiatric:      Comments: Unable to assess       Labs:   Lab Results   Component Value Date/Time    SODIUM 140 11/18/2021 02:38 AM    POTASSIUM 3.6 11/18/2021 02:38 AM    CHLORIDE 103 11/18/2021 02:38 AM    CO2 26 11/18/2021 02:38 AM    GLUCOSE 121 (H) 11/18/2021 02:38 AM    BUN 13 11/18/2021  02:38 AM    CREATININE 0.62 11/18/2021 02:38 AM    CREATININE 0.7 02/25/2008 06:05 AM    BUNCREATRAT 19 06/20/2016 10:28 AM      Lab Results   Component Value Date/Time    PROTHROMBTM 11.2 02/19/2008 05:30 AM    INR 0.97 02/19/2008 05:30 AM      Lab Results   Component Value Date/Time    WBC 15.3 (H) 11/18/2021 02:38 AM    RBC 4.07 (L) 11/18/2021 02:38 AM    HEMOGLOBIN 12.9 11/18/2021 02:38 AM    HEMATOCRIT 39.1 11/18/2021 02:38 AM    MCV 96.1 11/18/2021 02:38 AM    MCH 31.7 11/18/2021 02:38 AM    MCHC 33.0 (L) 11/18/2021 02:38 AM    MPV 10.1 11/18/2021 02:38 AM    NEUTSPOLYS 77.40 (H) 11/18/2021 02:38 AM    LYMPHOCYTES 14.10 (L) 11/18/2021 02:38 AM    MONOCYTES 4.40 11/18/2021 02:38 AM    EOSINOPHILS 3.30 11/18/2021 02:38 AM    BASOPHILS 0.20 11/18/2021 02:38 AM        Imaging:   DX-SKULL-LIMITED 3-   Final Result      Postsurgical change in the cervical spine. Otherwise no evidence of metallic foreign body.      DX-PELVIS-1 OR 2 VIEWS   Final Result      No radiopaque foreign body identified.      DX-CHEST-LIMITED (1 VIEW)   Final Result         Enlarged cardiac silhouette without evidence of acute disease.      No pacemaker.      [id number] VIEW   Final Result      No radiopaque foreign body identified.      IR-US GUIDED PIV   Final Result    Ultrasound-guided PERIPHERAL IV INSERTION performed by    qualified nursing staff as above.      EC-ECHOCARDIOGRAM COMPLETE W/O CONT   Final Result      DX-CHEST-PORTABLE (1 VIEW)   Final Result      Limited exam showing no acute cardiopulmonary disease.      CT-HEAD W/O   Final Result      1.  Extensive atrophy and white matter changes.   2.  No acute intracranial hemorrhage or territorial infarct.   3.  Small chronic LEFT frontal infarct.          Problem Representation:   # Acute Encephalopathy  -H/o baseline Dementia worsening for the past 6 months  -Longterm alcohol use with possible wernicke's encephalopathy   -Hypernatemia on admission  -BUN, CPK elevated on exam,  possible dehydration and uremia worsening the dementia  -CT head- no hematoma, signs of vascular dementia  -B12/Folate, TSH WNL  -CXR and U/A unremarkable, BC - pending  -Monitor labs  -PT, OT, SLP evaluation  -Continue high dose Thiamine, Folate, Multivitamin Supplementation  -Continue IVF for now  -On soft restraints  Ordering xray abdomen, head, chest for evaluation of foreign body for MRI head for stroke workup.    Repeat CT head ordered for stroke work-up.      # New onset Atrial fibrillation with RVR  -IV metoprolol PRN   -Creased on Metoprolol 50m to 75 g BID, will titrate as needed  -Will discuss about anti-coagulation with DPOA    #Hypertensive urgency with h/o Hypertension  -Held amlodipine 10mg OD as she was started on metoprolol  -PRN labetolol and enalapril with parameters    # Hypernatremia  -Resolved    # Hypokalemia  -Monitor and replete PRN    # Pre-diabetes  -HbA1C 6.1 in 11/2021  -On ISS, accucheks for hyperglycemia with hypoglycemia protocol    # Leukocytosis  -11/16: WBC at 14.8, on admission at 16.7  -Likely reactive    #Lactic acidosis  -Resolved  -Possible dehydration    # Debility  # Severe protein calorie malnutrition  -Dietary consult  -Continue thiamine and MVT  -Discuss with DPOA on tube feeds    # Chronic alcohol use  -No signs of withdrawal      CODE: DNR/DNI  DVT Prophylaxis: Lovenox  DISPO: AMS, not medically stable, poor prognosis

## 2021-11-20 LAB
ALBUMIN SERPL BCP-MCNC: 2.6 G/DL (ref 3.2–4.9)
ALBUMIN/GLOB SERPL: 1.1 G/DL
ALP SERPL-CCNC: 91 U/L (ref 30–99)
ALT SERPL-CCNC: 18 U/L (ref 2–50)
AMORPH CRY #/AREA URNS HPF: PRESENT /HPF
ANION GAP SERPL CALC-SCNC: 8 MMOL/L (ref 7–16)
APPEARANCE UR: ABNORMAL
AST SERPL-CCNC: 14 U/L (ref 12–45)
BACTERIA #/AREA URNS HPF: ABNORMAL /HPF
BACTERIA BLD CULT: NORMAL
BACTERIA BLD CULT: NORMAL
BASOPHILS # BLD AUTO: 0.3 % (ref 0–1.8)
BASOPHILS # BLD: 0.03 K/UL (ref 0–0.12)
BILIRUB SERPL-MCNC: 0.2 MG/DL (ref 0.1–1.5)
BILIRUB UR QL STRIP.AUTO: NEGATIVE
BUN SERPL-MCNC: 21 MG/DL (ref 8–22)
CALCIUM SERPL-MCNC: 8.4 MG/DL (ref 8.5–10.5)
CHLORIDE SERPL-SCNC: 108 MMOL/L (ref 96–112)
CO2 SERPL-SCNC: 24 MMOL/L (ref 20–33)
COLOR UR: YELLOW
CREAT SERPL-MCNC: 0.57 MG/DL (ref 0.5–1.4)
EOSINOPHIL # BLD AUTO: 0.75 K/UL (ref 0–0.51)
EOSINOPHIL NFR BLD: 6.7 % (ref 0–6.9)
EPI CELLS #/AREA URNS HPF: ABNORMAL /HPF
ERYTHROCYTE [DISTWIDTH] IN BLOOD BY AUTOMATED COUNT: 49 FL (ref 35.9–50)
GLOBULIN SER CALC-MCNC: 2.4 G/DL (ref 1.9–3.5)
GLUCOSE BLD-MCNC: 121 MG/DL (ref 65–99)
GLUCOSE BLD-MCNC: 145 MG/DL (ref 65–99)
GLUCOSE SERPL-MCNC: 148 MG/DL (ref 65–99)
GLUCOSE UR STRIP.AUTO-MCNC: NEGATIVE MG/DL
HCT VFR BLD AUTO: 35.2 % (ref 37–47)
HGB BLD-MCNC: 11.7 G/DL (ref 12–16)
HYALINE CASTS #/AREA URNS LPF: ABNORMAL /LPF
IMM GRANULOCYTES # BLD AUTO: 0.05 K/UL (ref 0–0.11)
IMM GRANULOCYTES NFR BLD AUTO: 0.4 % (ref 0–0.9)
KETONES UR STRIP.AUTO-MCNC: ABNORMAL MG/DL
LEUKOCYTE ESTERASE UR QL STRIP.AUTO: ABNORMAL
LYMPHOCYTES # BLD AUTO: 3.15 K/UL (ref 1–4.8)
LYMPHOCYTES NFR BLD: 28.1 % (ref 22–41)
MCH RBC QN AUTO: 32.1 PG (ref 27–33)
MCHC RBC AUTO-ENTMCNC: 33.2 G/DL (ref 33.6–35)
MCV RBC AUTO: 96.7 FL (ref 81.4–97.8)
MICRO URNS: ABNORMAL
MONOCYTES # BLD AUTO: 0.63 K/UL (ref 0–0.85)
MONOCYTES NFR BLD AUTO: 5.6 % (ref 0–13.4)
NEUTROPHILS # BLD AUTO: 6.59 K/UL (ref 2–7.15)
NEUTROPHILS NFR BLD: 58.9 % (ref 44–72)
NITRITE UR QL STRIP.AUTO: NEGATIVE
NRBC # BLD AUTO: 0 K/UL
NRBC BLD-RTO: 0 /100 WBC
PH UR STRIP.AUTO: 8.5 [PH] (ref 5–8)
PLATELET # BLD AUTO: 307 K/UL (ref 164–446)
PMV BLD AUTO: 10.1 FL (ref 9–12.9)
POTASSIUM SERPL-SCNC: 3.8 MMOL/L (ref 3.6–5.5)
PROT SERPL-MCNC: 5 G/DL (ref 6–8.2)
PROT UR QL STRIP: 100 MG/DL
RBC # BLD AUTO: 3.64 M/UL (ref 4.2–5.4)
RBC # URNS HPF: ABNORMAL /HPF
RBC UR QL AUTO: ABNORMAL
SIGNIFICANT IND 70042: NORMAL
SIGNIFICANT IND 70042: NORMAL
SITE SITE: NORMAL
SITE SITE: NORMAL
SODIUM SERPL-SCNC: 140 MMOL/L (ref 135–145)
SOURCE SOURCE: NORMAL
SOURCE SOURCE: NORMAL
SP GR UR STRIP.AUTO: 1.01
UROBILINOGEN UR STRIP.AUTO-MCNC: 0.2 MG/DL
WBC # BLD AUTO: 11.2 K/UL (ref 4.8–10.8)
WBC #/AREA URNS HPF: ABNORMAL /HPF

## 2021-11-20 PROCEDURE — 81001 URINALYSIS AUTO W/SCOPE: CPT

## 2021-11-20 PROCEDURE — A9270 NON-COVERED ITEM OR SERVICE: HCPCS

## 2021-11-20 PROCEDURE — 85025 COMPLETE CBC W/AUTO DIFF WBC: CPT

## 2021-11-20 PROCEDURE — 80053 COMPREHEN METABOLIC PANEL: CPT

## 2021-11-20 PROCEDURE — 36415 COLL VENOUS BLD VENIPUNCTURE: CPT

## 2021-11-20 PROCEDURE — A9270 NON-COVERED ITEM OR SERVICE: HCPCS | Performed by: STUDENT IN AN ORGANIZED HEALTH CARE EDUCATION/TRAINING PROGRAM

## 2021-11-20 PROCEDURE — 700102 HCHG RX REV CODE 250 W/ 637 OVERRIDE(OP)

## 2021-11-20 PROCEDURE — 700102 HCHG RX REV CODE 250 W/ 637 OVERRIDE(OP): Performed by: STUDENT IN AN ORGANIZED HEALTH CARE EDUCATION/TRAINING PROGRAM

## 2021-11-20 PROCEDURE — 82962 GLUCOSE BLOOD TEST: CPT | Mod: 91

## 2021-11-20 PROCEDURE — 770020 HCHG ROOM/CARE - TELE (206)

## 2021-11-20 RX ORDER — QUETIAPINE FUMARATE 25 MG/1
12.5 TABLET, FILM COATED ORAL ONCE
Status: COMPLETED | OUTPATIENT
Start: 2021-11-20 | End: 2021-11-20

## 2021-11-20 RX ORDER — METOPROLOL TARTRATE 50 MG/1
50 TABLET, FILM COATED ORAL EVERY 6 HOURS
Status: DISCONTINUED | OUTPATIENT
Start: 2021-11-20 | End: 2021-11-20

## 2021-11-20 RX ORDER — QUETIAPINE FUMARATE 25 MG/1
12.5 TABLET, FILM COATED ORAL
Status: DISCONTINUED | OUTPATIENT
Start: 2021-11-20 | End: 2021-11-20

## 2021-11-20 RX ORDER — QUETIAPINE FUMARATE 25 MG/1
12.5 TABLET, FILM COATED ORAL 2 TIMES DAILY PRN
Status: DISCONTINUED | OUTPATIENT
Start: 2021-11-20 | End: 2021-11-22

## 2021-11-20 RX ORDER — ERGOCALCIFEROL 1.25 MG/1
50000 CAPSULE ORAL
Status: DISCONTINUED | OUTPATIENT
Start: 2021-11-20 | End: 2021-11-30 | Stop reason: HOSPADM

## 2021-11-20 RX ADMIN — METOPROLOL TARTRATE 75 MG: 50 TABLET, FILM COATED ORAL at 17:40

## 2021-11-20 RX ADMIN — MULTIPLE VITAMINS W/ MINERALS TAB 1 TABLET: TAB at 04:52

## 2021-11-20 RX ADMIN — QUETIAPINE FUMARATE 12.5 MG: 25 TABLET ORAL at 17:31

## 2021-11-20 RX ADMIN — METOPROLOL TARTRATE 75 MG: 50 TABLET, FILM COATED ORAL at 04:50

## 2021-11-20 RX ADMIN — ERGOCALCIFEROL 50000 UNITS: 1.25 CAPSULE ORAL at 17:31

## 2021-11-20 RX ADMIN — APIXABAN 2.5 MG: 5 TABLET, FILM COATED ORAL at 17:31

## 2021-11-20 RX ADMIN — APIXABAN 2.5 MG: 5 TABLET, FILM COATED ORAL at 04:49

## 2021-11-20 RX ADMIN — QUETIAPINE FUMARATE 12.5 MG: 25 TABLET ORAL at 13:18

## 2021-11-20 RX ADMIN — Medication 100 MG: at 06:00

## 2021-11-20 ASSESSMENT — PATIENT HEALTH QUESTIONNAIRE - PHQ9
1. LITTLE INTEREST OR PLEASURE IN DOING THINGS: NOT AT ALL
SUM OF ALL RESPONSES TO PHQ9 QUESTIONS 1 AND 2: 0
2. FEELING DOWN, DEPRESSED, IRRITABLE, OR HOPELESS: NOT AT ALL

## 2021-11-20 ASSESSMENT — PAIN DESCRIPTION - PAIN TYPE: TYPE: ACUTE PAIN

## 2021-11-20 ASSESSMENT — FIBROSIS 4 INDEX: FIB4 SCORE: 0.92

## 2021-11-20 NOTE — PROGRESS NOTES
Bedside report received from day RN, pt care assumed, assessment completed. Pt is A&O 1, denies having pain, Afib in the 90s on the monitor. Updated on POC, questions answered. Bed in lowest, locked position, treaded socks on, call light and belongings within reach. Fall precautions in place.

## 2021-11-20 NOTE — PROGRESS NOTES
Monitor Summary:   Rhythm: Aflutter  Rate:   Measurement: .na/.07/.na  Ectopy: PVCs, PACs

## 2021-11-20 NOTE — THERAPY
Physical Therapy   Daily Treatment     Patient Name: Viki García  Age:  86 y.o., Sex:  female  Medical Record #: [id number]  Today's Date: 11/19/2021     Precautions  Precautions: Fall Risk;Swallow Precautions ( See Comments)    Assessment    Pt presents today w/ continue cog deficits, initially hesitant to participate in therapy requiring extensive encouragement and eventually agreeable.  She was heavily soiled up entering pt's room, partially cleaned pt up to perform bed mobility.  Pt required maxA to complete bed mobility w/ hob elevated and use of bed rails, and required assist to maintain balance seated eob d/t posterior lean.  Following about 5min seated eob, pt actively pushing posteriorly to return to supine refusing further mobility.  RN present and assisted pt BTB for further perineal cares. Pt then voicing she is embarrassed to have male therapist in the room while she is exposed.  Asked the pt if she is more comfortable working w/ a female therapist, however pt w/ no response.  Recommend placement at this time given pt's inability to care for self and risk of future falls.  Will follow for acute PT needs.      Plan    Continue current treatment plan.    DC Equipment Recommendations: Unable to determine at this time  Discharge Recommendations: Recommend post-acute placement for additional physical therapy services prior to discharge home      Subjective/Objective       11/19/21 1542   Cognition    Cognition / Consciousness X   Level of Consciousness Alert   Comments pt dysarthric and delayed responses w/ limited command following   Passive ROM Lower Body   Passive ROM Lower Body WDL   Active ROM Lower Body    Active ROM Lower Body  X   Comments   (limited BLE d/t generalized weakness)   Strength Lower Body   Lower Body Strength  X   Comments as above   Sensation Lower Body   Lower Extremity Sensation   Not Tested   Neuro-Muscular Treatments   Neuro-Muscular Treatments Anterior weight shift;Weight Shift  Right;Weight Shift Left   Comments seated eob   Other Treatments   Other Treatments Provided bed mobility   Neurological Concerns   Neurological Concerns Yes   Sitting Posture During ADL's Posterior Lean   Balance   Sitting Balance (Static) Poor   Sitting Balance (Dynamic) Trace +   Weight Shift Sitting Poor   Skilled Intervention Verbal Cuing;Tactile Cuing;Facilitation;Compensatory Strategies   Comments seated eob only   Gait Analysis   Gait Level Of Assist Unable to Participate   Weight Bearing Status no restrictions   Bed Mobility    Supine to Sit Maximal Assist   Sit to Supine Maximal Assist   Scooting Maximal Assist   Rolling Maximum Assist to Lt.   Skilled Intervention Verbal Cuing;Tactile Cuing;Sequencing;Facilitation;Compensatory Strategies   Comments hob elevated, use of bed rails   Functional Mobility   Sit to Stand Refused   Bed, Chair, Wheelchair Transfer Refused   Mobility seated eob only   Skilled Intervention Verbal Cuing;Tactile Cuing   Activity Tolerance   Sitting Edge of Bed 5min   Patient / Family Goals    Patient / Family Goal #1 none stated   Short Term Goals    Short Term Goal # 1 pt will be able to complete supine<>sitting with SPV in 6tx in order to return home   Goal Outcome # 1 goal not met   Short Term Goal # 2 pt will be able to complete functional transfers with min assist in 6tx in order to progress back to prior level   Goal Outcome # 2 Goal not met   Short Term Goal # 3 pt will be able to ambulate 25ft with FWW and min assist in 6tx in order to progress home   Goal Outcome # 3 Goal not met   Education Group   Education Provided Role of Physical Therapist   Role of Physical Therapist Patient Response Patient;Acceptance;Explanation;Demonstration;Action Demonstration;Reinforcement Needed   Additional Comments pt demos limited receptivity d/t cog status   Session Information   Date / Session Number  11/19- 3 (3/3, 11/21)

## 2021-11-20 NOTE — PROGRESS NOTES
Daily Progress Note:     Date of Service: 11/20/2021  Primary Team: [facility] Team   Attending: Dr. Dooley.   Senior Resident: Dr. Nolen   Intern: Dr. Arreaga   Contact:  [phone]    Chief Complaint:   AMS, Hypernatremia     ID: Patient is an 86-year-old female with a significant history of baseline dementia(A&O x1), who was admitted on 11/12 for altered mental status. Developed Afib RVR while hospitalized.     Interval Hx:  No events overnight , patient mentation vastly improved today.  Increased anxiety and confusion due to patient aware of current declined state.  Patient aware of declining dementia. pt was able to answer questions, states no pains or complaints at this time.   Yesterday Metroprolol increased from 50 to 75 twice daily.  Today Per telemetry flutter and PVCs with a rate controlled at 80 to 90s.  Nurse states abnormal smell from urine, ordered urinalysis.  Due to increased confusion and state of anxiety Seroquel 12.5 every 12 as needed ordered.  Ordered vitamin D supplementation.    Consultants/Specialty:  SLP  PT/OT    Review of Systems:    Review of Systems   Unable to perform ROS: Mental acuity       Objective Data:   Physical Exam:   Vitals:   Temp:  [36.5 °C (97.7 °F)-37.8 °C (100 °F)] 37.8 °C (100 °F)  Pulse:  [] 103  Resp:  [16-17] 17  BP: ()/(60-85) 136/85  SpO2:  [95 %-97 %] 95 %     PHYSICAL EXAM: Limited because of AMS  Physical Exam  Constitutional:       General: She is sleeping. She is not in acute distress.     Appearance: She is ill-appearing.      Interventions: She is sedated.      Comments: Mostly somnolent, oriented to self  Not able to follow commands   HENT:      Head: Normocephalic and atraumatic.   Cardiovascular:      Rate and Rhythm: Normal rate. Rhythm irregular.      Pulses: Normal pulses.      Heart sounds: Normal heart sounds.   Pulmonary:      Effort: No respiratory distress.      Breath sounds: Normal breath sounds.   Abdominal:      General:  Bowel sounds are normal.      Palpations: Abdomen is soft. There is no mass.      Tenderness: There is no abdominal tenderness.   Musculoskeletal:         General: No swelling or tenderness.   Skin:     General: Skin is warm.      Capillary Refill: Capillary refill takes less than 2 seconds.   Neurological:      Cranial Nerves: Dysarthria present. No facial asymmetry.      Motor: No tremor.      Comments: Unable to perform complete neurological exam because of AMS   Psychiatric:         Mood and Affect: Mood is anxious. Affect is tearful.         Cognition and Memory: Cognition is impaired. Memory is impaired.       Labs:   Lab Results   Component Value Date/Time    SODIUM 140 11/20/2021 12:34 AM    POTASSIUM 3.8 11/20/2021 12:34 AM    CHLORIDE 108 11/20/2021 12:34 AM    CO2 24 11/20/2021 12:34 AM    GLUCOSE 148 (H) 11/20/2021 12:34 AM    BUN 21 11/20/2021 12:34 AM    CREATININE 0.57 11/20/2021 12:34 AM    CREATININE 0.7 02/25/2008 06:05 AM    BUNCREATRAT 19 06/20/2016 10:28 AM      Lab Results   Component Value Date/Time    PROTHROMBTM 11.2 02/19/2008 05:30 AM    INR 0.97 02/19/2008 05:30 AM      Lab Results   Component Value Date/Time    WBC 11.2 (H) 11/20/2021 12:34 AM    RBC 3.64 (L) 11/20/2021 12:34 AM    HEMOGLOBIN 11.7 (L) 11/20/2021 12:34 AM    HEMATOCRIT 35.2 (L) 11/20/2021 12:34 AM    MCV 96.7 11/20/2021 12:34 AM    MCH 32.1 11/20/2021 12:34 AM    MCHC 33.2 (L) 11/20/2021 12:34 AM    MPV 10.1 11/20/2021 12:34 AM    NEUTSPOLYS 58.90 11/20/2021 12:34 AM    LYMPHOCYTES 28.10 11/20/2021 12:34 AM    MONOCYTES 5.60 11/20/2021 12:34 AM    EOSINOPHILS 6.70 11/20/2021 12:34 AM    BASOPHILS 0.30 11/20/2021 12:34 AM        Imaging:   CT-HEAD W/O   Final Result         1.  No acute intracranial abnormality is identified, nonspecific changes, most commonly associated with small vessel ischemic disease. Associated moderate cerebral atrophy.   2.  Atherosclerosis.         DX-SKULL-LIMITED 3-   Final Result       Postsurgical change in the cervical spine. Otherwise no evidence of metallic foreign body.      DX-PELVIS-1 OR 2 VIEWS   Final Result      No radiopaque foreign body identified.      DX-CHEST-LIMITED (1 VIEW)   Final Result         Enlarged cardiac silhouette without evidence of acute disease.      No pacemaker.      WB-IVGAEOF-9 VIEW   Final Result      No radiopaque foreign body identified.      IR-US GUIDED PIV   Final Result    Ultrasound-guided PERIPHERAL IV INSERTION performed by    qualified nursing staff as above.      EC-ECHOCARDIOGRAM COMPLETE W/O CONT   Final Result      DX-CHEST-PORTABLE (1 VIEW)   Final Result      Limited exam showing no acute cardiopulmonary disease.      CT-HEAD W/O   Final Result      1.  Extensive atrophy and white matter changes.   2.  No acute intracranial hemorrhage or territorial infarct.   3.  Small chronic LEFT frontal infarct.              Problem Representation:     # Acute Encephalopathy  -H/o baseline Dementia worsening for the past 6 months  -Longterm alcohol use with possible wernicke's encephalopathy, continued thiamine supplementation.   -Hypernatemia on admission, resolved.   -BUN, CPK elevated on exam, possible dehydration and uremia worsening the dementia, resolved.   -B12/Folate, TSH WNL  -CT head- no hematoma, signs of vascular dementia. Repeat CT head ordered for stroke work-up was unchanged from prior.   -CXR and U/A unremarkable, BC NGTD.   -Monitor labs  -PT, OT, SLP evaluation        # New onset Atrial fibrillation with RVR  -IV metoprolol PRN   -Increased on Metoprolol 50m to 75 g BID, will titrate as needed.  Rate controlled 80s to 90s.  DPOA was counseled risks as patient has CHADSVAS 7, stroke risk of 15.7% a year  HASBLED score of 3, risk of bleed is 5% a year.   Spoke to DPOA, spoke about risks of anticuagulation of bleeding as pt has hx of falls. vs throbusus. DPOA was counseled on risks vs therapeutic effects of anticoagulation. Agrees with plan to  start Anticuagulation with risks of stroke.   Apixaban 2.5 mg BID.  Adjusted for age and weight.      #Hypertensive urgency with h/o Hypertension  -Held amlodipine 10mg OD as she was started on metoprolol  -PRN labetolol and enalapril with parameters  -Blood pressure within goal    #Vitamin D insufficiency  Vitamin D2 50,000 units weekly    # Hypernatremia  -Resolved    # Hypokalemia  -Monitor and replete PRN    # Pre-diabetes  -HbA1C 6.1 in 11/2021  -On ISS, accucheks for hyperglycemia with hypoglycemia protocol discontinued.     # Leukocytosis  -11/16: WBC downtrending, on admission at 16.7  -Likely reactive    #Lactic acidosis  -Resolved  -Possible dehydration    # Debility  # Severe protein calorie malnutrition  -Dietary consult  -Continue thiamine and MVT  -Discuss with DPOA on tube feeds    # Chronic alcohol use  -No signs of withdrawal      CODE: DNR/DNI  DISPO: AMS, Improving Mentation. Discussing options for SNF.

## 2021-11-20 NOTE — CARE PLAN
The patient is Watcher - Medium risk of patient condition declining or worsening    Shift Goals  Clinical Goals: Monitor mentation  Patient Goals: rest/sleep  Family Goals: n/a      Problem: Psychosocial  Goal: Patient's level of anxiety will decrease  Outcome: Progressing     Problem: Skin Integrity  Goal: Skin integrity is maintained or improved  Outcome: Progressing       Patient is not progressing towards the following goals:      Problem: Knowledge Deficit - Standard  Goal: Patient and family/care givers will demonstrate understanding of plan of care, disease process/condition, diagnostic tests and medications  Outcome: Not Progressing     Problem: Lifestyle Changes  Goal: Patient's ability to identify lifestyle changes and available resources to help reduce recurrence of condition will improve  Outcome: Not Progressing

## 2021-11-20 NOTE — THERAPY
Speech Language Pathology  Daily Treatment     Patient Name: Viki García  Age:  86 y.o., Sex:  female  Medical Record #: [id number]  Today's Date: 11/19/2021     Precautions: Fall Risk,Swallow Precautions ( See Comments)  Comments: confused; reports doesn't know who she is    Assessment    Patient was seen on this date for dysphagia treatment. Patient awake with improving mentation and speech although remains significantly confused. She was AAO to self and hospital only. Patient required mod-max encouragement to consume items from meal tray.    Patient agreeable to 4 oz pears (soft solids), 4 oz MTL, and ~8 oz trials of thin liquids. Patient no overt s/sx of aspiration with soft solids. Throat clear x2 occurred with trials of MTL. Patient had weak cough x3 and increasing in gurgly vocal quality and congested coughing response following trials of thin liquids via cup and straw sips. Patient declined further PO trials.     Recommendations  1. Continue soft & bite size and mildly thick liquid diet               -1:1 feeding  2. Meds whole with MTL wash  3. Will consider a diagnostic swallow study (MBSS) with any ongoing concerns for aspiration     Plan    Continue current treatment plan.    Discharge Recommendations: Recommend post-acute placement for additional speech therapy services prior to discharge home    Objective       11/19/21 1415   Vitals   O2 Delivery Device None - Room Air   Dysphagia    Positioning / Behavior Modification Modulate Rate or Bite Size   Other Treatments PO trials of thin liquids, MTL, and soft solids   Diet / Liquid Recommendation Soft & Bite-Sized (6) - (Dysphagia III);Mildly Thick (2) - (Nectar Thick)   Skilled Intervention Compensatory Strategies;Verbal Cueing   Recommended Route of Medication Administration   Medication Administration  Float Whole with Puree   Short Term Goals   Short Term Goal # 2 NEW 11/18/21: Patient will consume a SB6/MT2 diet with no overt s/sx of aspiration  given 1:1 feeding.    Goal Outcome # 2  Progressing as expected   Short Term Goal # 3 Patient will consume thin liquids with no overt s/sx of aspiration.    Goal Outcome  # 3 Progressing slower than expected

## 2021-11-21 LAB
AMORPH CRY #/AREA URNS HPF: PRESENT /HPF
ANION GAP SERPL CALC-SCNC: 8 MMOL/L (ref 7–16)
APPEARANCE UR: ABNORMAL
BACTERIA #/AREA URNS HPF: ABNORMAL /HPF
BASOPHILS # BLD AUTO: 0.4 % (ref 0–1.8)
BASOPHILS # BLD: 0.05 K/UL (ref 0–0.12)
BILIRUB UR QL STRIP.AUTO: NEGATIVE
BUN SERPL-MCNC: 23 MG/DL (ref 8–22)
CALCIUM SERPL-MCNC: 8.7 MG/DL (ref 8.5–10.5)
CHLORIDE SERPL-SCNC: 108 MMOL/L (ref 96–112)
CO2 SERPL-SCNC: 23 MMOL/L (ref 20–33)
COLOR UR: YELLOW
CREAT SERPL-MCNC: 0.63 MG/DL (ref 0.5–1.4)
EOSINOPHIL # BLD AUTO: 0.68 K/UL (ref 0–0.51)
EOSINOPHIL NFR BLD: 5.2 % (ref 0–6.9)
EPI CELLS #/AREA URNS HPF: NEGATIVE /HPF
ERYTHROCYTE [DISTWIDTH] IN BLOOD BY AUTOMATED COUNT: 50.4 FL (ref 35.9–50)
GLUCOSE SERPL-MCNC: 137 MG/DL (ref 65–99)
GLUCOSE UR STRIP.AUTO-MCNC: NEGATIVE MG/DL
HCT VFR BLD AUTO: 37.3 % (ref 37–47)
HGB BLD-MCNC: 12.1 G/DL (ref 12–16)
HYALINE CASTS #/AREA URNS LPF: ABNORMAL /LPF
IMM GRANULOCYTES # BLD AUTO: 0.07 K/UL (ref 0–0.11)
IMM GRANULOCYTES NFR BLD AUTO: 0.5 % (ref 0–0.9)
KETONES UR STRIP.AUTO-MCNC: NEGATIVE MG/DL
LEUKOCYTE ESTERASE UR QL STRIP.AUTO: ABNORMAL
LYMPHOCYTES # BLD AUTO: 3.08 K/UL (ref 1–4.8)
LYMPHOCYTES NFR BLD: 23.4 % (ref 22–41)
MAGNESIUM SERPL-MCNC: 1.9 MG/DL (ref 1.5–2.5)
MCH RBC QN AUTO: 32.6 PG (ref 27–33)
MCHC RBC AUTO-ENTMCNC: 32.4 G/DL (ref 33.6–35)
MCV RBC AUTO: 100.5 FL (ref 81.4–97.8)
MICRO URNS: ABNORMAL
MONOCYTES # BLD AUTO: 0.81 K/UL (ref 0–0.85)
MONOCYTES NFR BLD AUTO: 6.2 % (ref 0–13.4)
NEUTROPHILS # BLD AUTO: 8.48 K/UL (ref 2–7.15)
NEUTROPHILS NFR BLD: 64.3 % (ref 44–72)
NITRITE UR QL STRIP.AUTO: NEGATIVE
NRBC # BLD AUTO: 0 K/UL
NRBC BLD-RTO: 0 /100 WBC
PH UR STRIP.AUTO: 8 [PH] (ref 5–8)
PLATELET # BLD AUTO: 299 K/UL (ref 164–446)
PMV BLD AUTO: 10 FL (ref 9–12.9)
POTASSIUM SERPL-SCNC: 3.9 MMOL/L (ref 3.6–5.5)
PROT UR QL STRIP: 100 MG/DL
RBC # BLD AUTO: 3.71 M/UL (ref 4.2–5.4)
RBC # URNS HPF: ABNORMAL /HPF
RBC UR QL AUTO: ABNORMAL
SODIUM SERPL-SCNC: 139 MMOL/L (ref 135–145)
SP GR UR STRIP.AUTO: 1.01
UROBILINOGEN UR STRIP.AUTO-MCNC: 0.2 MG/DL
WBC # BLD AUTO: 13.2 K/UL (ref 4.8–10.8)
WBC #/AREA URNS HPF: ABNORMAL /HPF

## 2021-11-21 PROCEDURE — A9270 NON-COVERED ITEM OR SERVICE: HCPCS

## 2021-11-21 PROCEDURE — 36415 COLL VENOUS BLD VENIPUNCTURE: CPT

## 2021-11-21 PROCEDURE — 80048 BASIC METABOLIC PNL TOTAL CA: CPT

## 2021-11-21 PROCEDURE — 700102 HCHG RX REV CODE 250 W/ 637 OVERRIDE(OP): Performed by: STUDENT IN AN ORGANIZED HEALTH CARE EDUCATION/TRAINING PROGRAM

## 2021-11-21 PROCEDURE — 770020 HCHG ROOM/CARE - TELE (206)

## 2021-11-21 PROCEDURE — 81001 URINALYSIS AUTO W/SCOPE: CPT

## 2021-11-21 PROCEDURE — 700111 HCHG RX REV CODE 636 W/ 250 OVERRIDE (IP)

## 2021-11-21 PROCEDURE — A9270 NON-COVERED ITEM OR SERVICE: HCPCS | Performed by: STUDENT IN AN ORGANIZED HEALTH CARE EDUCATION/TRAINING PROGRAM

## 2021-11-21 PROCEDURE — 87186 SC STD MICRODIL/AGAR DIL: CPT

## 2021-11-21 PROCEDURE — 700102 HCHG RX REV CODE 250 W/ 637 OVERRIDE(OP)

## 2021-11-21 PROCEDURE — 87086 URINE CULTURE/COLONY COUNT: CPT

## 2021-11-21 PROCEDURE — 85025 COMPLETE CBC W/AUTO DIFF WBC: CPT

## 2021-11-21 PROCEDURE — 87077 CULTURE AEROBIC IDENTIFY: CPT

## 2021-11-21 PROCEDURE — 83735 ASSAY OF MAGNESIUM: CPT

## 2021-11-21 PROCEDURE — 700105 HCHG RX REV CODE 258

## 2021-11-21 RX ORDER — TAMSULOSIN HYDROCHLORIDE 0.4 MG/1
0.4 CAPSULE ORAL
Status: DISCONTINUED | OUTPATIENT
Start: 2021-11-21 | End: 2021-11-25

## 2021-11-21 RX ORDER — METOPROLOL TARTRATE 50 MG/1
50 TABLET, FILM COATED ORAL EVERY 6 HOURS
Status: DISCONTINUED | OUTPATIENT
Start: 2021-11-21 | End: 2021-11-21

## 2021-11-21 RX ORDER — SODIUM CHLORIDE, SODIUM LACTATE, POTASSIUM CHLORIDE, CALCIUM CHLORIDE 600; 310; 30; 20 MG/100ML; MG/100ML; MG/100ML; MG/100ML
INJECTION, SOLUTION INTRAVENOUS CONTINUOUS
Status: DISCONTINUED | OUTPATIENT
Start: 2021-11-21 | End: 2021-11-23 | Stop reason: DRUGHIGH

## 2021-11-21 RX ADMIN — DOCUSATE SODIUM 50 MG AND SENNOSIDES 8.6 MG 2 TABLET: 8.6; 5 TABLET, FILM COATED ORAL at 05:27

## 2021-11-21 RX ADMIN — Medication 100 MG: at 05:27

## 2021-11-21 RX ADMIN — TAMSULOSIN HYDROCHLORIDE 0.4 MG: 0.4 CAPSULE ORAL at 18:33

## 2021-11-21 RX ADMIN — CEFTRIAXONE SODIUM 1 G: 1 INJECTION, POWDER, FOR SOLUTION INTRAMUSCULAR; INTRAVENOUS at 18:28

## 2021-11-21 RX ADMIN — SODIUM CHLORIDE, POTASSIUM CHLORIDE, SODIUM LACTATE AND CALCIUM CHLORIDE: 600; 310; 30; 20 INJECTION, SOLUTION INTRAVENOUS at 12:45

## 2021-11-21 RX ADMIN — APIXABAN 2.5 MG: 5 TABLET, FILM COATED ORAL at 05:27

## 2021-11-21 RX ADMIN — QUETIAPINE FUMARATE 12.5 MG: 25 TABLET ORAL at 23:05

## 2021-11-21 RX ADMIN — DOCUSATE SODIUM 50 MG AND SENNOSIDES 8.6 MG 2 TABLET: 8.6; 5 TABLET, FILM COATED ORAL at 17:25

## 2021-11-21 RX ADMIN — MULTIPLE VITAMINS W/ MINERALS TAB 1 TABLET: TAB at 05:27

## 2021-11-21 RX ADMIN — APIXABAN 2.5 MG: 5 TABLET, FILM COATED ORAL at 17:24

## 2021-11-21 RX ADMIN — METOPROLOL TARTRATE 75 MG: 50 TABLET, FILM COATED ORAL at 17:24

## 2021-11-21 RX ADMIN — METOPROLOL TARTRATE 75 MG: 50 TABLET, FILM COATED ORAL at 05:27

## 2021-11-21 RX ADMIN — METOPROLOL TARTRATE 75 MG: 50 TABLET, FILM COATED ORAL at 11:40

## 2021-11-21 ASSESSMENT — PAIN SCALES - WONG BAKER: WONGBAKER_NUMERICALRESPONSE: DOESN'T HURT AT ALL

## 2021-11-21 ASSESSMENT — LIFESTYLE VARIABLES
CONSUMPTION TOTAL: NEGATIVE
TOTAL SCORE: 0
TOTAL SCORE: 0
EVER FELT BAD OR GUILTY ABOUT YOUR DRINKING: NO
HAVE PEOPLE ANNOYED YOU BY CRITICIZING YOUR DRINKING: NO
HOW MANY TIMES IN THE PAST YEAR HAVE YOU HAD 5 OR MORE DRINKS IN A DAY: 0
TOTAL SCORE: 0
AVERAGE NUMBER OF DAYS PER WEEK YOU HAVE A DRINK CONTAINING ALCOHOL: 0
HAVE YOU EVER FELT YOU SHOULD CUT DOWN ON YOUR DRINKING: NO
DOES PATIENT WANT TO STOP DRINKING: NO
EVER HAD A DRINK FIRST THING IN THE MORNING TO STEADY YOUR NERVES TO GET RID OF A HANGOVER: NO
ON A TYPICAL DAY WHEN YOU DRINK ALCOHOL HOW MANY DRINKS DO YOU HAVE: 0
ALCOHOL_USE: NO

## 2021-11-21 ASSESSMENT — FIBROSIS 4 INDEX: FIB4 SCORE: 0.95

## 2021-11-21 NOTE — CARE PLAN
Problem: Knowledge Deficit - Standard  Goal: Patient and family/care givers will demonstrate understanding of plan of care, disease process/condition, diagnostic tests and medications  Outcome: Not Met  Note: Education provided regarding disease process and POC. Pt confused and agitated at times. No sign of learning noted     Problem: Fall Risk  Goal: Patient will remain free from falls  Outcome: Met  Note: Free from falls and injuries   The patient is Watcher - Medium risk of patient condition declining or worsening    Shift Goals  Clinical Goals: Monitor mentation  Patient Goals: rest/sleep  Family Goals: n/a    Progress made toward(s) clinical / shift goals:  Pt more awake and alert    Patient is not progressing towards the following goals:No progression in mentation      Problem: Knowledge Deficit - Standard  Goal: Patient and family/care givers will demonstrate understanding of plan of care, disease process/condition, diagnostic tests and medications  Outcome: Not Met  Note: Education provided regarding disease process and POC. Pt confused and agitated at times. No sign of learning noted

## 2021-11-21 NOTE — PROGRESS NOTES
Received order for Seroquel 12.5mg for pt agitation. Medication given, pt remains restless, agitated and impulsive. Informed , received order for 12.5mg repeat dose one time. Pt remains close to nurses station and is reoriented as needed

## 2021-11-21 NOTE — PROGRESS NOTES
Assumed care of pt, received bedside report from NOC RN. Pt sitting up in bed, no complaints of pain, room air, A/Ox1, to self. Fall and safety precautions in place, strip alarm in place. No further needs at this time.

## 2021-11-21 NOTE — PROGRESS NOTES
Daily Progress Note:     Date of Service: 11/21/2021  Primary Team: [facility]   Attending: Dr. Dooley.   Senior Resident: Dr. Nolen   Intern: Dr. Arreaga   Contact:  [phone]    Chief Complaint:   AMS, Hypernatremia     ID: Patient is an 86-year-old female with a significant history of baseline dementia(A&O x1), who was admitted on 11/12 for altered mental status. Developed Afib RVR while hospitalized, converted to sinus 2 AM 11/21/2021.     Interval Hx:  No events overnight, patient given Seroquel yesterday 12.5 mg x1 for agitation was drowsy on evaluation today, patient mental status improved, no slurring of speech noted.  We will let patient sleep.  Consider Mini-Mental exam tomorrow when patient is more awake.  Nurse states abnormal smell from urine, ordered urinalysis, sample taken from used bedpan.   Repeat UA, per nursing patient reports no suprapubic tenderness or dysuria.  Per telemetry patient converted to sinus at 2 AM last night, with rate ranging from 70-85.  Poor p.o. intake, will start IV fluids      Consultants/Specialty:  SLP  PT/OT    Review of Systems:    Review of Systems   Unable to perform ROS: Mental acuity       Objective Data:   Physical Exam:   Vitals:   Temp:  [36.7 °C (98 °F)-37.8 °C (100 °F)] 36.7 °C (98 °F)  Pulse:  [] 74  Resp:  [16-18] 18  BP: (119-139)/(58-89) 119/58  SpO2:  [95 %-99 %] 96 %     PHYSICAL EXAM: Limited because of AMS  Physical Exam  Constitutional:       General: She is sleeping. She is not in acute distress.     Appearance: She is not ill-appearing.      Interventions: She is sedated.      Comments: Mostly somnolent   HENT:      Head: Normocephalic and atraumatic.      Mouth/Throat:      Mouth: Mucous membranes are dry.   Cardiovascular:      Rate and Rhythm: Normal rate and regular rhythm.      Pulses: Normal pulses.      Heart sounds: Normal heart sounds.   Pulmonary:      Effort: No respiratory distress.      Breath sounds: Normal breath  sounds.   Abdominal:      General: Bowel sounds are normal.      Palpations: Abdomen is soft. There is no mass.      Tenderness: There is no abdominal tenderness.   Musculoskeletal:         General: No swelling or tenderness.   Skin:     General: Skin is warm.      Capillary Refill: Capillary refill takes less than 2 seconds.   Neurological:      Cranial Nerves: No dysarthria or facial asymmetry.      Motor: No tremor.      Comments: Oriented x2 to person and place   Psychiatric:         Mood and Affect: Mood is anxious. Affect is tearful.         Cognition and Memory: Cognition is impaired. Memory is impaired.       Labs:   Lab Results   Component Value Date/Time    SODIUM 139 11/21/2021 03:21 AM    POTASSIUM 3.9 11/21/2021 03:21 AM    CHLORIDE 108 11/21/2021 03:21 AM    CO2 23 11/21/2021 03:21 AM    GLUCOSE 137 (H) 11/21/2021 03:21 AM    BUN 23 (H) 11/21/2021 03:21 AM    CREATININE 0.63 11/21/2021 03:21 AM    CREATININE 0.7 02/25/2008 06:05 AM    BUNCREATRAT 19 06/20/2016 10:28 AM      Lab Results   Component Value Date/Time    PROTHROMBTM 11.2 02/19/2008 05:30 AM    INR 0.97 02/19/2008 05:30 AM      Lab Results   Component Value Date/Time    WBC 13.2 (H) 11/21/2021 03:21 AM    RBC 3.71 (L) 11/21/2021 03:21 AM    HEMOGLOBIN 12.1 11/21/2021 03:21 AM    HEMATOCRIT 37.3 11/21/2021 03:21 AM    .5 (H) 11/21/2021 03:21 AM    MCH 32.6 11/21/2021 03:21 AM    MCHC 32.4 (L) 11/21/2021 03:21 AM    MPV 10.0 11/21/2021 03:21 AM    NEUTSPOLYS 64.30 11/21/2021 03:21 AM    LYMPHOCYTES 23.40 11/21/2021 03:21 AM    MONOCYTES 6.20 11/21/2021 03:21 AM    EOSINOPHILS 5.20 11/21/2021 03:21 AM    BASOPHILS 0.40 11/21/2021 03:21 AM        Imaging:   CT-HEAD W/O   Final Result         1.  No acute intracranial abnormality is identified, nonspecific changes, most commonly associated with small vessel ischemic disease. Associated moderate cerebral atrophy.   2.  Atherosclerosis.         DX-SKULL-LIMITED 3-   Final Result       Postsurgical change in the cervical spine. Otherwise no evidence of metallic foreign body.      DX-PELVIS-1 OR 2 VIEWS   Final Result      No radiopaque foreign body identified.      DX-CHEST-LIMITED (1 VIEW)   Final Result         Enlarged cardiac silhouette without evidence of acute disease.      No pacemaker.      [id number] VIEW   Final Result      No radiopaque foreign body identified.      IR-US GUIDED PIV   Final Result    Ultrasound-guided PERIPHERAL IV INSERTION performed by    qualified nursing staff as above.      EC-ECHOCARDIOGRAM COMPLETE W/O CONT   Final Result      DX-CHEST-PORTABLE (1 VIEW)   Final Result      Limited exam showing no acute cardiopulmonary disease.      CT-HEAD W/O   Final Result      1.  Extensive atrophy and white matter changes.   2.  No acute intracranial hemorrhage or territorial infarct.   3.  Small chronic LEFT frontal infarct.              Problem Representation:     # Acute Encephalopathy  -H/o baseline Dementia worsening for the past 6 months  -Longterm alcohol use, continued thiamine supplementation.   -Hypernatemia on admission, resolved.   -BUN, CPK elevated on exam, possible dehydration and uremia worsening the dementia, resolved.   -B12/Folate, TSH WNL  -CT head- no hematoma, signs of vascular dementia. Repeat CT head ordered for stroke work-up was unchanged from prior.   -CXR and U/A unremarkable, BC NGTD.   -Monitor labs  -PT, OT, SLP evaluation  Patient mentation vastly improved, AOA x2 to person and place.   Consider Mini-Mental exam tomorrow when patient is more awake.       #Urinary tract infection  11/20/2021-Per nursing urine has a foul odor.  Ordered urinalysis, due to collected from used bedpan will have to have a repeat urinalysis ordered using straight cath 11/21/2021.  Patient asymptomatic  -In setting of leukocytosis present on admission recently increased from 11.2 to 13.2 today.  Repeat UA pending       # New onset Atrial fibrillation with RVR  -IV  metoprolol PRN   -Metoprolol 75 g BID, will titrate as needed.  Rate controlled 80s to 90s.  DPOA was counseled risks as patient has CHADSVAS 7, stroke risk of 15.7% a year  HASBLED score of 3, risk of bleed is 5% a year.   Spoke to DPOA, spoke about risks of anticuagulation of bleeding as pt has hx of falls. vs throbusus. DPOA was counseled on risks vs therapeutic effects of anticoagulation. Agrees with plan to start Anticuagulation with risks of stroke.   Apixaban 2.5 mg BID.  Adjusted for age and weight.  Patient converted to sinus rhythm 11/21/2021      #Hypertensive urgency with h/o Hypertension  -Held amlodipine 10mg OD as she was started on metoprolol  -PRN labetolol and enalapril with parameters  -Blood pressure within goal    #Vitamin D insufficiency  Vitamin D2 50,000 units weekly    # Hypernatremia  -Resolved    # Hypokalemia  -Monitor and replete PRN    # Pre-diabetes  -HbA1C 6.1 in 11/2021  -On ISS, accucheks for hyperglycemia with hypoglycemia protocol discontinued.     # Leukocytosis  -11/16: WBC downtrending, on admission at 16.7  -Likely reactive    #Lactic acidosis  -Resolved  -Possible dehydration    # Debility  # Severe protein calorie malnutrition  -Dietary consult  -Continue thiamine and MVT  -Discuss with DPOA on tube feeds    # Chronic alcohol use  -No signs of withdrawal      CODE: DNR/DNI  DISPO: AMS, Improving Mentation.  Pending UA.  Discussing options for SNF.

## 2021-11-21 NOTE — PROGRESS NOTES
Monitor Summary  Afib/Aflutter 93-140bpm  Patient converted to SR 0315, MD Lyle made aware  SR 60  PVC

## 2021-11-21 NOTE — PROGRESS NOTES
Spoke with [facility] regarding pt continued restlessness and noted foul smelling urine. Requested medication for agitation and restlessness. Received order for UA.

## 2021-11-22 PROBLEM — E55.9 VITAMIN D DEFICIENCY: Status: ACTIVE | Noted: 2021-11-22

## 2021-11-22 PROBLEM — R74.8 ELEVATED CPK: Status: RESOLVED | Noted: 2021-11-13 | Resolved: 2021-11-22

## 2021-11-22 PROBLEM — R73.03 PREDIABETES: Status: ACTIVE | Noted: 2021-11-22

## 2021-11-22 PROBLEM — I16.0 HYPERTENSIVE URGENCY: Status: ACTIVE | Noted: 2021-11-22

## 2021-11-22 PROBLEM — E87.0 HYPERNATREMIA: Status: RESOLVED | Noted: 2021-11-13 | Resolved: 2021-11-22

## 2021-11-22 PROBLEM — F10.939 ALCOHOL WITHDRAWAL (HCC): Status: RESOLVED | Noted: 2021-11-13 | Resolved: 2021-11-22

## 2021-11-22 PROBLEM — E87.6 HYPOKALEMIA: Status: ACTIVE | Noted: 2021-11-22

## 2021-11-22 PROBLEM — D72.829 LEUKOCYTOSIS: Status: ACTIVE | Noted: 2021-11-22

## 2021-11-22 PROBLEM — E87.20 LACTIC ACIDOSIS: Status: ACTIVE | Noted: 2021-11-22
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
## 2021-11-22 PROBLEM — N39.0 UTI (URINARY TRACT INFECTION): Status: ACTIVE | Noted: 2021-11-22

## 2021-11-22 PROBLEM — R55 SYNCOPE: Status: RESOLVED | Noted: 2021-11-13 | Resolved: 2021-11-22

## 2021-11-22 LAB
ALBUMIN SERPL BCP-MCNC: 2.7 G/DL (ref 3.2–4.9)
ALBUMIN/GLOB SERPL: 1 G/DL
ALP SERPL-CCNC: 91 U/L (ref 30–99)
ALT SERPL-CCNC: 20 U/L (ref 2–50)
ANION GAP SERPL CALC-SCNC: 6 MMOL/L (ref 7–16)
AST SERPL-CCNC: 17 U/L (ref 12–45)
BASOPHILS # BLD AUTO: 0.3 % (ref 0–1.8)
BASOPHILS # BLD: 0.04 K/UL (ref 0–0.12)
BILIRUB SERPL-MCNC: <0.2 MG/DL (ref 0.1–1.5)
BUN SERPL-MCNC: 26 MG/DL (ref 8–22)
CALCIUM SERPL-MCNC: 8.7 MG/DL (ref 8.5–10.5)
CHLORIDE SERPL-SCNC: 106 MMOL/L (ref 96–112)
CO2 SERPL-SCNC: 26 MMOL/L (ref 20–33)
CREAT SERPL-MCNC: 0.74 MG/DL (ref 0.5–1.4)
EOSINOPHIL # BLD AUTO: 0.58 K/UL (ref 0–0.51)
EOSINOPHIL NFR BLD: 4.4 % (ref 0–6.9)
ERYTHROCYTE [DISTWIDTH] IN BLOOD BY AUTOMATED COUNT: 49 FL (ref 35.9–50)
GLOBULIN SER CALC-MCNC: 2.6 G/DL (ref 1.9–3.5)
GLUCOSE SERPL-MCNC: 126 MG/DL (ref 65–99)
HCT VFR BLD AUTO: 32.7 % (ref 37–47)
HGB BLD-MCNC: 10.4 G/DL (ref 12–16)
IMM GRANULOCYTES # BLD AUTO: 0.07 K/UL (ref 0–0.11)
IMM GRANULOCYTES NFR BLD AUTO: 0.5 % (ref 0–0.9)
LYMPHOCYTES # BLD AUTO: 2.84 K/UL (ref 1–4.8)
LYMPHOCYTES NFR BLD: 21.5 % (ref 22–41)
MAGNESIUM SERPL-MCNC: 1.8 MG/DL (ref 1.5–2.5)
MCH RBC QN AUTO: 31.3 PG (ref 27–33)
MCHC RBC AUTO-ENTMCNC: 31.8 G/DL (ref 33.6–35)
MCV RBC AUTO: 98.5 FL (ref 81.4–97.8)
MONOCYTES # BLD AUTO: 0.77 K/UL (ref 0–0.85)
MONOCYTES NFR BLD AUTO: 5.8 % (ref 0–13.4)
NEUTROPHILS # BLD AUTO: 8.9 K/UL (ref 2–7.15)
NEUTROPHILS NFR BLD: 67.5 % (ref 44–72)
NRBC # BLD AUTO: 0 K/UL
NRBC BLD-RTO: 0 /100 WBC
PLATELET # BLD AUTO: 280 K/UL (ref 164–446)
PMV BLD AUTO: 9.8 FL (ref 9–12.9)
POTASSIUM SERPL-SCNC: 4 MMOL/L (ref 3.6–5.5)
PROT SERPL-MCNC: 5.3 G/DL (ref 6–8.2)
RBC # BLD AUTO: 3.32 M/UL (ref 4.2–5.4)
SODIUM SERPL-SCNC: 138 MMOL/L (ref 135–145)
WBC # BLD AUTO: 13.2 K/UL (ref 4.8–10.8)

## 2021-11-22 PROCEDURE — 51798 US URINE CAPACITY MEASURE: CPT

## 2021-11-22 PROCEDURE — A9270 NON-COVERED ITEM OR SERVICE: HCPCS | Performed by: STUDENT IN AN ORGANIZED HEALTH CARE EDUCATION/TRAINING PROGRAM

## 2021-11-22 PROCEDURE — A9270 NON-COVERED ITEM OR SERVICE: HCPCS

## 2021-11-22 PROCEDURE — 83735 ASSAY OF MAGNESIUM: CPT

## 2021-11-22 PROCEDURE — 99232 SBSQ HOSP IP/OBS MODERATE 35: CPT | Mod: GC | Performed by: INTERNAL MEDICINE

## 2021-11-22 PROCEDURE — 85025 COMPLETE CBC W/AUTO DIFF WBC: CPT

## 2021-11-22 PROCEDURE — 700102 HCHG RX REV CODE 250 W/ 637 OVERRIDE(OP): Performed by: STUDENT IN AN ORGANIZED HEALTH CARE EDUCATION/TRAINING PROGRAM

## 2021-11-22 PROCEDURE — 700105 HCHG RX REV CODE 258

## 2021-11-22 PROCEDURE — 87086 URINE CULTURE/COLONY COUNT: CPT

## 2021-11-22 PROCEDURE — 87077 CULTURE AEROBIC IDENTIFY: CPT

## 2021-11-22 PROCEDURE — 700111 HCHG RX REV CODE 636 W/ 250 OVERRIDE (IP)

## 2021-11-22 PROCEDURE — 770020 HCHG ROOM/CARE - TELE (206)

## 2021-11-22 PROCEDURE — 80053 COMPREHEN METABOLIC PANEL: CPT

## 2021-11-22 PROCEDURE — 700111 HCHG RX REV CODE 636 W/ 250 OVERRIDE (IP): Performed by: STUDENT IN AN ORGANIZED HEALTH CARE EDUCATION/TRAINING PROGRAM

## 2021-11-22 PROCEDURE — 700102 HCHG RX REV CODE 250 W/ 637 OVERRIDE(OP)

## 2021-11-22 PROCEDURE — 36415 COLL VENOUS BLD VENIPUNCTURE: CPT

## 2021-11-22 PROCEDURE — 87186 SC STD MICRODIL/AGAR DIL: CPT

## 2021-11-22 RX ORDER — QUETIAPINE FUMARATE 25 MG/1
6.25 TABLET, FILM COATED ORAL
Status: DISCONTINUED | OUTPATIENT
Start: 2021-11-22 | End: 2021-11-24

## 2021-11-22 RX ORDER — MAGNESIUM SULFATE HEPTAHYDRATE 40 MG/ML
2 INJECTION, SOLUTION INTRAVENOUS ONCE
Status: COMPLETED | OUTPATIENT
Start: 2021-11-22 | End: 2021-11-22

## 2021-11-22 RX ORDER — METOPROLOL TARTRATE 50 MG/1
50 TABLET, FILM COATED ORAL TWICE DAILY
Status: DISCONTINUED | OUTPATIENT
Start: 2021-11-22 | End: 2021-11-30 | Stop reason: HOSPADM

## 2021-11-22 RX ADMIN — DOCUSATE SODIUM 50 MG AND SENNOSIDES 8.6 MG 2 TABLET: 8.6; 5 TABLET, FILM COATED ORAL at 05:17

## 2021-11-22 RX ADMIN — Medication 100 MG: at 05:15

## 2021-11-22 RX ADMIN — TAMSULOSIN HYDROCHLORIDE 0.4 MG: 0.4 CAPSULE ORAL at 18:39

## 2021-11-22 RX ADMIN — DOCUSATE SODIUM 50 MG AND SENNOSIDES 8.6 MG 2 TABLET: 8.6; 5 TABLET, FILM COATED ORAL at 18:38

## 2021-11-22 RX ADMIN — APIXABAN 2.5 MG: 5 TABLET, FILM COATED ORAL at 05:15

## 2021-11-22 RX ADMIN — APIXABAN 2.5 MG: 5 TABLET, FILM COATED ORAL at 18:38

## 2021-11-22 RX ADMIN — CEFTRIAXONE SODIUM 1 G: 1 INJECTION, POWDER, FOR SOLUTION INTRAMUSCULAR; INTRAVENOUS at 18:39

## 2021-11-22 RX ADMIN — MAGNESIUM SULFATE 2 G: 2 INJECTION INTRAVENOUS at 08:58

## 2021-11-22 RX ADMIN — METOPROLOL TARTRATE 50 MG: 50 TABLET, FILM COATED ORAL at 18:39

## 2021-11-22 RX ADMIN — MULTIPLE VITAMINS W/ MINERALS TAB 1 TABLET: TAB at 05:16

## 2021-11-22 RX ADMIN — QUETIAPINE FUMARATE 6.25 MG: 25 TABLET ORAL at 20:21

## 2021-11-22 ASSESSMENT — PAIN SCALES - WONG BAKER: WONGBAKER_NUMERICALRESPONSE: DOESN'T HURT AT ALL

## 2021-11-22 ASSESSMENT — FIBROSIS 4 INDEX: FIB4 SCORE: 1.17

## 2021-11-22 NOTE — CARE PLAN
Problem: Nutritional:  Goal: Achieve adequate nutritional intake  Description: Advance diet when medically feasible. Patient will consume >50% of meals  Outcome: Met    Pt meals consistently at % from 11/20 to 11/21 w/ one Boost recorded of 25-50% per ADLs.      hard copy

## 2021-11-22 NOTE — ASSESSMENT & PLAN NOTE
-HbA1C 6.1 in 11/2021  -On ISS, accucheks for hyperglycemia with hypoglycemia protocol discontinued.

## 2021-11-22 NOTE — ASSESSMENT & PLAN NOTE
11/20/2021: Per nursing urine has a foul odor. Ordered urinalysis, due to collected from used bedpan repeat urinalysis ordered.   11/21/2021: Straight cath ordered. Patient asymptomatic  - Leukocytosis  - Repeat UA: Nitrite neg, Leukocyte esterase positive.  - UCx - gram negative rods.  - C3 (stopped total of 3 days), flowmax  - Straight cath for retention  - No lamb.

## 2021-11-22 NOTE — ASSESSMENT & PLAN NOTE
New onset.   - IV metoprolol PRN   - Metoprolol 75 g BID, will titrate as needed.  Rate controlled 80s to 90s.  DPOA was counseled risks as patient has CHADSVAS 7, stroke risk of 15.7% a year  HASBLED score of 3, risk of bleed is 5% a year.   Spoke to DPOA, spoke about risks of anticuagulation of bleeding as pt has hx of falls. vs throbusus. DPOA was counseled on risks vs therapeutic effects of anticoagulation. Agrees with plan to start Anticuagulation with risks of stroke.   Apixaban 2.5 mg BID.  Adjusted for age and weight.  Patient converted to sinus rhythm 11/21/2021

## 2021-11-22 NOTE — PROGRESS NOTES
Bladder scanned pt. Pt retaining 466mL of urine. Paged [facility] on call resident. Waiting for orders to straight cath.

## 2021-11-22 NOTE — PROGRESS NOTES
Assumed care of patient @1915, beside report received from Jose RN, Pt A&OX1 only alert to self and denies any pain. Bed locked an lowered with call light in reach and questions answered in present time.

## 2021-11-22 NOTE — CARE PLAN
The patient is Stable - Low risk of patient condition declining or worsening    Shift Goals  Monitor mentation and monitor HR, manage pain.   Clinical Goals: Monitor HR, Monitor mendation  Patient Goals: rest  Family Goals: N/A    Progress made toward(s) clinical / shift goals:  Administered medications as ordered, assessed mentation.     Patient is not progressing towards the following goals:      Problem: Psychosocial  Goal: Patient's level of anxiety will decrease  Outcome: Progressing     Problem: Skin Integrity  Goal: Skin integrity is maintained or improved  Outcome: Progressing     Problem: Pain - Standard  Goal: Alleviation of pain or a reduction in pain to the patient’s comfort goal  Outcome: Progressing

## 2021-11-22 NOTE — CARE PLAN
Problem: Knowledge Deficit - Standard  Goal: Patient and family/care givers will demonstrate understanding of plan of care, disease process/condition, diagnostic tests and medications  Outcome: Progressing     Problem: Lifestyle Changes  Goal: Patient's ability to identify lifestyle changes and available resources to help reduce recurrence of condition will improve  Outcome: Progressing   The patient is Watcher - Medium risk of patient condition declining or worsening    Shift Goals  Clinical Goals: monitor HR, saftey, monitor mentation  Patient Goals: rest  Family Goals: n/a    Progress made toward(s) clinical / shift goals:  Pt is participating in care.     Patient is not progressing towards the following goals:

## 2021-11-22 NOTE — ASSESSMENT & PLAN NOTE
- Held amlodipine 10mg OD as she was started on metoprolol  - PRN labetolol and enalapril with parameters  - Blood pressure within goal

## 2021-11-22 NOTE — PROGRESS NOTES
Assumed care of patient at bedside report from NOC RN. Updated on POC. Unable to assess patient A & O on room air; up x2 assist; without complaints of acute pain. Assessment completed Call light within reach. Whiteboard updated. Fall precautions in place. Bed locked and in lowest position. All questions answered. No other needs indicated at this time.

## 2021-11-22 NOTE — PROGRESS NOTES
Daily Progress Note:     Date of Service: 11/22/2021  Primary Team: [facility] Blue Team   Attending: Dr. Dooley.   Senior Resident: Dr. Nolen   Intern: Dr. Russ  Contact:  [phone]    Chief Complaint:   AMS, Hypernatremia    ID: Patient is an 86-year-old female with a significant history of baseline dementia (AAO x1), who was admitted on 11/12 for altered mental status. Developed Afib RVR while hospitalized, converted to sinus 2 AM 11/21/2021.     Interval Hx:  No Acute events reported overnight, patient was given 12.5 mg Seroquel for agitation and was found to be drowsy in the morning and later in the afternoon. Mini-Mental exam not possible to be done today, plan to to do tomorrow if patient is less drowsy with decreases dose of Seroquel to 6.25. Urine analysis from straight cath shows UTI, pending UCx. Patient converted to sinus rhythm on 11/21/21. Patient has poor PO intake, will be continued on IVF.       Consultants/Specialty:  SLP  PT/OT    Review of Systems:    Review of Systems   Unable to perform ROS: Mental acuity       Objective Data:   Physical Exam:   Vitals:   Temp:  [36.1 °C (96.9 °F)-37.3 °C (99.1 °F)] 36.1 °C (96.9 °F)  Pulse:  [50-95] 65  Resp:  [16-18] 16  BP: (102-145)/(49-89) 125/49  SpO2:  [91 %-99 %] 94 %     PHYSICAL EXAM: Limited because of AMS  Physical Exam  Constitutional:       General: She is sleeping.      Appearance: She is not ill-appearing.      Interventions: She is sedated.      Comments: Somnolent, not verbally responsive.    HENT:      Head: Normocephalic and atraumatic.      Mouth/Throat:      Mouth: Mucous membranes are dry.   Cardiovascular:      Rate and Rhythm: Normal rate and regular rhythm.      Pulses: Normal pulses.      Heart sounds: Normal heart sounds.   Pulmonary:      Effort: No respiratory distress.      Breath sounds: Normal breath sounds. No stridor. No wheezing or rales.   Abdominal:      General: Bowel sounds are normal.      Palpations: Abdomen  is soft. There is no mass.      Tenderness: There is no abdominal tenderness. There is no guarding.   Musculoskeletal:         General: No swelling or tenderness.   Skin:     General: Skin is warm and dry.      Capillary Refill: Capillary refill takes less than 2 seconds.   Neurological:      Mental Status: She is disoriented.      Cranial Nerves: No dysarthria or facial asymmetry.      Motor: No tremor.      Comments: Not oriented to name, place or time.        Labs:   Lab Results   Component Value Date/Time    SODIUM 138 11/22/2021 02:33 AM    POTASSIUM 4.0 11/22/2021 02:33 AM    CHLORIDE 106 11/22/2021 02:33 AM    CO2 26 11/22/2021 02:33 AM    GLUCOSE 126 (H) 11/22/2021 02:33 AM    BUN 26 (H) 11/22/2021 02:33 AM    CREATININE 0.74 11/22/2021 02:33 AM    CREATININE 0.7 02/25/2008 06:05 AM    BUNCREATRAT 19 06/20/2016 10:28 AM      Lab Results   Component Value Date/Time    PROTHROMBTM 11.2 02/19/2008 05:30 AM    INR 0.97 02/19/2008 05:30 AM      Lab Results   Component Value Date/Time    WBC 13.2 (H) 11/22/2021 02:33 AM    RBC 3.32 (L) 11/22/2021 02:33 AM    HEMOGLOBIN 10.4 (L) 11/22/2021 02:33 AM    HEMATOCRIT 32.7 (L) 11/22/2021 02:33 AM    MCV 98.5 (H) 11/22/2021 02:33 AM    MCH 31.3 11/22/2021 02:33 AM    MCHC 31.8 (L) 11/22/2021 02:33 AM    MPV 9.8 11/22/2021 02:33 AM    NEUTSPOLYS 67.50 11/22/2021 02:33 AM    LYMPHOCYTES 21.50 (L) 11/22/2021 02:33 AM    MONOCYTES 5.80 11/22/2021 02:33 AM    EOSINOPHILS 4.40 11/22/2021 02:33 AM    BASOPHILS 0.30 11/22/2021 02:33 AM        Imaging:   CT-HEAD W/O   Final Result         1.  No acute intracranial abnormality is identified, nonspecific changes, most commonly associated with small vessel ischemic disease. Associated moderate cerebral atrophy.   2.  Atherosclerosis.         DX-SKULL-LIMITED 3-   Final Result      Postsurgical change in the cervical spine. Otherwise no evidence of metallic foreign body.      DX-PELVIS-1 OR 2 VIEWS   Final Result      No radiopaque  foreign body identified.      DX-CHEST-LIMITED (1 VIEW)   Final Result         Enlarged cardiac silhouette without evidence of acute disease.      No pacemaker.      [id number] VIEW   Final Result      No radiopaque foreign body identified.      IR-US GUIDED PIV   Final Result    Ultrasound-guided PERIPHERAL IV INSERTION performed by    qualified nursing staff as above.      EC-ECHOCARDIOGRAM COMPLETE W/O CONT   Final Result      DX-CHEST-PORTABLE (1 VIEW)   Final Result      Limited exam showing no acute cardiopulmonary disease.      CT-HEAD W/O   Final Result      1.  Extensive atrophy and white matter changes.   2.  No acute intracranial hemorrhage or territorial infarct.   3.  Small chronic LEFT frontal infarct.        Altered mental status  Assessment & Plan  Dementia vs vascular dementia, vs delirium (d/t UTI) less likely as not present initially. Hx of baseline dementia, progressively worsening for the past 6 months. Hx of alcohol use disorder. Hyponatremia on admission has been resolved. Elevated BUN and CPK possible d/t dehydration has resolved.   - B12/Folate, TSH WNL  - CT head- no hematoma, signs of vascular dementia. Repeat CT head ordered for stroke work-up was unchanged from prior.   - CXR unremarkable, BC NGTD.  - UTI treated w/ Ceftriaxone.  - Monitor labs  - PT, OT, SLP evaluation  - Patient has been somnolent possible to Seroquel, decreased dose 6.25 mg.  - Plan to do Mini-Mental exam tomorrow when patient is more awake on lower Seroquel.     UTI (urinary tract infection)  Assessment & Plan  11/20/2021: Per nursing urine has a foul odor. Ordered urinalysis, due to collected from used bedpan repeat urinalysis ordered.   11/21/2021: Straight cath ordered. Patient asymptomatic  - Leukocytosis  - Repeat UA: Nitrite neg, Leukocyte esterase positive.  - UCx - pending.  - C3, flowmax    Atrial fibrillation with RVR (HCC)  Assessment & Plan  New onset.   - IV metoprolol PRN   - Metoprolol 75 g BID, will  titrate as needed.  Rate controlled 80s to 90s.  DPOA was counseled risks as patient has CHADSVAS 7, stroke risk of 15.7% a year  HASBLED score of 3, risk of bleed is 5% a year.   Spoke to DPOA, spoke about risks of anticuagulation of bleeding as pt has hx of falls. vs throbusus. DPOA was counseled on risks vs therapeutic effects of anticoagulation. Agrees with plan to start Anticuagulation with risks of stroke.   Apixaban 2.5 mg BID.  Adjusted for age and weight.  Patient converted to sinus rhythm 11/21/2021    Lactic acidosis  Assessment & Plan  Possible d/t dehydration  - Resolved      Leukocytosis  Assessment & Plan  - 11/22: WBC downtrending, on admission at 16.7  - Likely reactive    Prediabetes  Assessment & Plan  -HbA1C 6.1 in 11/2021  -On ISS, accucheks for hyperglycemia with hypoglycemia protocol discontinued.     Hypokalemia  Assessment & Plan  - Monitor and replete PRN    Vitamin D deficiency  Assessment & Plan  Vitamin D2 50,000 units weekly    Debility  Assessment & Plan  - Dietary consult  - Continue thiamine and MVT  - Discuss with DPOA on tube feeds      Hypernatremia  Assessment & Plan  - Resolved    Chronic alcohol abuse  Assessment & Plan  - No signs of withdrawal    Hypertensive urgency  Assessment & Plan  - Held amlodipine 10mg OD as she was started on metoprolol  - PRN labetolol and enalapril with parameters  - Blood pressure within goal          CODE: DNR/DNI  DISPO: AMS, Mini mental status to do.  Pending UCx.  Discussing options for SNF.

## 2021-11-23 LAB
ALBUMIN SERPL BCP-MCNC: 2.6 G/DL (ref 3.2–4.9)
ALBUMIN/GLOB SERPL: 0.9 G/DL
ALP SERPL-CCNC: 89 U/L (ref 30–99)
ALT SERPL-CCNC: 14 U/L (ref 2–50)
ANION GAP SERPL CALC-SCNC: 7 MMOL/L (ref 7–16)
AST SERPL-CCNC: 16 U/L (ref 12–45)
BASOPHILS # BLD AUTO: 0.3 % (ref 0–1.8)
BASOPHILS # BLD: 0.04 K/UL (ref 0–0.12)
BILIRUB SERPL-MCNC: 0.2 MG/DL (ref 0.1–1.5)
BUN SERPL-MCNC: 16 MG/DL (ref 8–22)
CALCIUM SERPL-MCNC: 8.9 MG/DL (ref 8.5–10.5)
CHLORIDE SERPL-SCNC: 105 MMOL/L (ref 96–112)
CO2 SERPL-SCNC: 23 MMOL/L (ref 20–33)
CREAT SERPL-MCNC: 0.63 MG/DL (ref 0.5–1.4)
EOSINOPHIL # BLD AUTO: 0.46 K/UL (ref 0–0.51)
EOSINOPHIL NFR BLD: 3.3 % (ref 0–6.9)
ERYTHROCYTE [DISTWIDTH] IN BLOOD BY AUTOMATED COUNT: 48.8 FL (ref 35.9–50)
GLOBULIN SER CALC-MCNC: 3 G/DL (ref 1.9–3.5)
GLUCOSE SERPL-MCNC: 110 MG/DL (ref 65–99)
HCT VFR BLD AUTO: 34.9 % (ref 37–47)
HGB BLD-MCNC: 11.2 G/DL (ref 12–16)
IMM GRANULOCYTES # BLD AUTO: 0.08 K/UL (ref 0–0.11)
IMM GRANULOCYTES NFR BLD AUTO: 0.6 % (ref 0–0.9)
LYMPHOCYTES # BLD AUTO: 2.48 K/UL (ref 1–4.8)
LYMPHOCYTES NFR BLD: 17.8 % (ref 22–41)
MAGNESIUM SERPL-MCNC: 2.2 MG/DL (ref 1.5–2.5)
MCH RBC QN AUTO: 31.8 PG (ref 27–33)
MCHC RBC AUTO-ENTMCNC: 32.1 G/DL (ref 33.6–35)
MCV RBC AUTO: 99.1 FL (ref 81.4–97.8)
MONOCYTES # BLD AUTO: 0.72 K/UL (ref 0–0.85)
MONOCYTES NFR BLD AUTO: 5.2 % (ref 0–13.4)
NEUTROPHILS # BLD AUTO: 10.17 K/UL (ref 2–7.15)
NEUTROPHILS NFR BLD: 72.8 % (ref 44–72)
NRBC # BLD AUTO: 0 K/UL
NRBC BLD-RTO: 0 /100 WBC
PLATELET # BLD AUTO: 263 K/UL (ref 164–446)
PMV BLD AUTO: 9.9 FL (ref 9–12.9)
POTASSIUM SERPL-SCNC: 4 MMOL/L (ref 3.6–5.5)
PROT SERPL-MCNC: 5.6 G/DL (ref 6–8.2)
RBC # BLD AUTO: 3.52 M/UL (ref 4.2–5.4)
SODIUM SERPL-SCNC: 135 MMOL/L (ref 135–145)
WBC # BLD AUTO: 14 K/UL (ref 4.8–10.8)

## 2021-11-23 PROCEDURE — 85025 COMPLETE CBC W/AUTO DIFF WBC: CPT

## 2021-11-23 PROCEDURE — 700105 HCHG RX REV CODE 258

## 2021-11-23 PROCEDURE — A9270 NON-COVERED ITEM OR SERVICE: HCPCS | Performed by: STUDENT IN AN ORGANIZED HEALTH CARE EDUCATION/TRAINING PROGRAM

## 2021-11-23 PROCEDURE — 700102 HCHG RX REV CODE 250 W/ 637 OVERRIDE(OP): Performed by: STUDENT IN AN ORGANIZED HEALTH CARE EDUCATION/TRAINING PROGRAM

## 2021-11-23 PROCEDURE — 700102 HCHG RX REV CODE 250 W/ 637 OVERRIDE(OP)

## 2021-11-23 PROCEDURE — A9270 NON-COVERED ITEM OR SERVICE: HCPCS

## 2021-11-23 PROCEDURE — 97530 THERAPEUTIC ACTIVITIES: CPT

## 2021-11-23 PROCEDURE — 92526 ORAL FUNCTION THERAPY: CPT

## 2021-11-23 PROCEDURE — 99232 SBSQ HOSP IP/OBS MODERATE 35: CPT | Mod: GC | Performed by: INTERNAL MEDICINE

## 2021-11-23 PROCEDURE — 36415 COLL VENOUS BLD VENIPUNCTURE: CPT

## 2021-11-23 PROCEDURE — 700111 HCHG RX REV CODE 636 W/ 250 OVERRIDE (IP): Performed by: STUDENT IN AN ORGANIZED HEALTH CARE EDUCATION/TRAINING PROGRAM

## 2021-11-23 PROCEDURE — 51798 US URINE CAPACITY MEASURE: CPT

## 2021-11-23 PROCEDURE — 80053 COMPREHEN METABOLIC PANEL: CPT

## 2021-11-23 PROCEDURE — 83735 ASSAY OF MAGNESIUM: CPT

## 2021-11-23 PROCEDURE — 770020 HCHG ROOM/CARE - TELE (206)

## 2021-11-23 PROCEDURE — 700105 HCHG RX REV CODE 258: Performed by: STUDENT IN AN ORGANIZED HEALTH CARE EDUCATION/TRAINING PROGRAM

## 2021-11-23 RX ADMIN — MULTIPLE VITAMINS W/ MINERALS TAB 1 TABLET: TAB at 06:43

## 2021-11-23 RX ADMIN — METOPROLOL TARTRATE 50 MG: 50 TABLET, FILM COATED ORAL at 17:07

## 2021-11-23 RX ADMIN — DOCUSATE SODIUM 50 MG AND SENNOSIDES 8.6 MG 2 TABLET: 8.6; 5 TABLET, FILM COATED ORAL at 06:43

## 2021-11-23 RX ADMIN — TAMSULOSIN HYDROCHLORIDE 0.4 MG: 0.4 CAPSULE ORAL at 17:07

## 2021-11-23 RX ADMIN — QUETIAPINE FUMARATE 6.25 MG: 25 TABLET ORAL at 21:16

## 2021-11-23 RX ADMIN — METOPROLOL TARTRATE 50 MG: 50 TABLET, FILM COATED ORAL at 06:51

## 2021-11-23 RX ADMIN — SODIUM CHLORIDE, POTASSIUM CHLORIDE, SODIUM LACTATE AND CALCIUM CHLORIDE: 600; 310; 30; 20 INJECTION, SOLUTION INTRAVENOUS at 06:44

## 2021-11-23 RX ADMIN — DOCUSATE SODIUM 50 MG AND SENNOSIDES 8.6 MG 2 TABLET: 8.6; 5 TABLET, FILM COATED ORAL at 17:07

## 2021-11-23 RX ADMIN — CEFTRIAXONE SODIUM 1 G: 1 INJECTION, POWDER, FOR SOLUTION INTRAMUSCULAR; INTRAVENOUS at 12:10

## 2021-11-23 RX ADMIN — Medication 100 MG: at 06:43

## 2021-11-23 RX ADMIN — APIXABAN 2.5 MG: 5 TABLET, FILM COATED ORAL at 06:43

## 2021-11-23 RX ADMIN — APIXABAN 2.5 MG: 5 TABLET, FILM COATED ORAL at 17:07

## 2021-11-23 ASSESSMENT — COGNITIVE AND FUNCTIONAL STATUS - GENERAL
STANDING UP FROM CHAIR USING ARMS: TOTAL
WALKING IN HOSPITAL ROOM: TOTAL
MOVING TO AND FROM BED TO CHAIR: UNABLE
MOBILITY SCORE: 6
CLIMB 3 TO 5 STEPS WITH RAILING: TOTAL
MOVING FROM LYING ON BACK TO SITTING ON SIDE OF FLAT BED: UNABLE
TURNING FROM BACK TO SIDE WHILE IN FLAT BAD: UNABLE
SUGGESTED CMS G CODE MODIFIER MOBILITY: CN

## 2021-11-23 ASSESSMENT — GAIT ASSESSMENTS: GAIT LEVEL OF ASSIST: UNABLE TO PARTICIPATE

## 2021-11-23 NOTE — PROGRESS NOTES
Bedside report received. Per night RN pt became increasingly agitated pulling on lines/ tele box/ lamb. Lamb removed. VS'S. RA. SB-SR on telemetry monitor. Patient A&O x self. Currently in BL soft wrist restraints. No complaints of pain or SOB at this time. LR running at 100 ml/hr.  Call light and belongings with in reach. Bed locked and in lowest position, alarm and fall precautions in place.

## 2021-11-23 NOTE — PROGRESS NOTES
Monitors notified this RN that patient was off tele box. Upon entering patient's room patient was attempting to remove IV. Telebox was completely off and patient had pulled out lamb. Balloon and tip intact. MD notified and orders for bilateral soft wrist restraints placed.

## 2021-11-23 NOTE — THERAPY
"Physical Therapy   Daily Treatment     Patient Name: Viki García  Age:  86 y.o., Sex:  female  Medical Record #: [id number]  Today's Date: 11/23/2021     Precautions  Precautions: Fall Risk;Swallow Precautions ( See Comments)  Comments: confused    Assessment    Pt seen for follow up PT tx. On arrival, pt attempted to crawl out of bed and asking for help. Pt assisted to EOB with max assist but then began lunging back to supine. Once again, assisted back to EOB and stood with max assist. Pt became resistive and refused further mobility. Pt continues to follow minimal commands. PT will cont while in acute    Plan    Continue current treatment plan.    DC Equipment Recommendations: Unable to determine at this time  Discharge Recommendations: Recommend post-acute placement for additional physical therapy services prior to discharge home      Subjective    \"You're a monster\"       11/23/21 1411   Cognition    Cognition / Consciousness X   Speech/ Communication Delayed Responses;Slurred;Incomprehensible Words   Orientation Level Not Oriented to Name;Not Oriented to Reason;Not Oriented to Place   Level of Consciousness Alert   Ability To Follow Commands Unable to Follow 1 Step Commands   Safety Awareness Impaired;Impulsive   New Learning Impaired   Attention Impaired   Sequencing Impaired   Initiation Impaired   Comments not very cooperative today   Other Treatments   Other Treatments Provided pt initially cooperative but once assisted to EOB became resistive. Attempted second time but continued to resist mobility   Neurological Concerns   Neurological Concerns Yes   Sitting Posture During ADL's Posterior Lean   Standing Posture During ADL's Posterior Lean   Balance   Sitting Balance (Static) Poor   Sitting Balance (Dynamic) Trace +   Standing Balance (Static) Trace   Standing Balance (Dynamic) Trace   Weight Shift Sitting Poor   Weight Shift Standing Poor   Skilled Intervention Verbal Cuing   Comments limited " participation   Gait Analysis   Gait Level Of Assist Unable to Participate   Bed Mobility    Supine to Sit Maximal Assist   Sit to Supine Maximal Assist   Scooting Maximal Assist   Rolling Maximal Assist to Rt.   Skilled Intervention Verbal Cuing   Functional Mobility   Sit to Stand Maximal Assist   Bed, Chair, Wheelchair Transfer Unable to Participate   Toilet Transfers Unable to Participate   Mobility EOB, STS   Skilled Intervention Verbal Cuing   Short Term Goals    Short Term Goal # 1 pt will be able to complete supine<>sitting with SPV in 6tx in order to return home   Goal Outcome # 1 goal not met   Short Term Goal # 2 pt will be able to complete functional transfers with min assist in 6tx in order to progress back to prior level   Goal Outcome # 2 Goal not met   Short Term Goal # 3 pt will be able to ambulate 25ft with FWW and min assist in 6tx in order to progress home   Goal Outcome # 3 Goal not met   Anticipated Discharge Equipment and Recommendations   DC Equipment Recommendations Unable to determine at this time   Discharge Recommendations Recommend post-acute placement for additional physical therapy services prior to discharge home

## 2021-11-23 NOTE — PROGRESS NOTES
Daily Progress Note:     Date of Service: 11/23/2021  Primary Team: [facility]   Attending: Dr. Dooley.   Senior Resident: Dr. Nolen   Intern: Dr. Russ  Contact:  [phone]    Chief Complaint:   AMS, Hypernatremia    ID: Patient is an 86-year-old female with a significant history of baseline dementia (AAO x1), who was admitted on 11/12 for altered mental status. Developed Afib RVR while hospitalized, converted to sinus 2 AM 11/21/2021.     Interval Hx:  Patient agitated at night pulling out her lamb, restraint orders were placed. Patient also tried to pull IV out. Seroquel was given al lower dose 6.25 at 8 pm. Patient was less somnolent this morning, but not very responsive. AAOX1 to name only. No more lamb, only straight cath will be done. Ceftriaxone to be stopped after today's dose.       Consultants/Specialty:  SLP  PT/OT    Review of Systems:    Review of Systems   Unable to perform ROS: Mental acuity       Objective Data:   Physical Exam:   Vitals:   Temp:  [36.6 °C (97.9 °F)-37.3 °C (99.2 °F)] 37.3 °C (99.2 °F)  Pulse:  [51-76] 57  Resp:  [16-19] 17  BP: (127-151)/() 127/66  SpO2:  [94 %-97 %] 94 %     PHYSICAL EXAM: Limited because of AMS  Physical Exam  Constitutional:       Interventions: She is sedated.      Comments: Awake, but not very responsive to questions.    HENT:      Head: Normocephalic and atraumatic.      Mouth/Throat:      Mouth: Mucous membranes are dry.   Cardiovascular:      Rate and Rhythm: Normal rate and regular rhythm.      Pulses: Normal pulses.      Heart sounds: Normal heart sounds.   Pulmonary:      Effort: No respiratory distress.      Breath sounds: Normal breath sounds. No stridor. No wheezing or rales.   Abdominal:      General: Bowel sounds are normal.      Palpations: Abdomen is soft. There is no mass.      Tenderness: There is no abdominal tenderness. There is no guarding.   Musculoskeletal:         General: No swelling or tenderness.   Skin:      General: Skin is warm and dry.      Capillary Refill: Capillary refill takes less than 2 seconds.   Neurological:      Mental Status: She is alert. She is disoriented.      Cranial Nerves: No dysarthria or facial asymmetry.      Motor: No tremor.      Comments: Oriented x1 to name. Not oriented to place or time.        Labs:   Lab Results   Component Value Date/Time    SODIUM 135 11/23/2021 02:44 AM    POTASSIUM 4.0 11/23/2021 02:44 AM    CHLORIDE 105 11/23/2021 02:44 AM    CO2 23 11/23/2021 02:44 AM    GLUCOSE 110 (H) 11/23/2021 02:44 AM    BUN 16 11/23/2021 02:44 AM    CREATININE 0.63 11/23/2021 02:44 AM    CREATININE 0.7 02/25/2008 06:05 AM    BUNCREATRAT 19 06/20/2016 10:28 AM      Lab Results   Component Value Date/Time    PROTHROMBTM 11.2 02/19/2008 05:30 AM    INR 0.97 02/19/2008 05:30 AM      Lab Results   Component Value Date/Time    WBC 14.0 (H) 11/23/2021 02:44 AM    RBC 3.52 (L) 11/23/2021 02:44 AM    HEMOGLOBIN 11.2 (L) 11/23/2021 02:44 AM    HEMATOCRIT 34.9 (L) 11/23/2021 02:44 AM    MCV 99.1 (H) 11/23/2021 02:44 AM    MCH 31.8 11/23/2021 02:44 AM    MCHC 32.1 (L) 11/23/2021 02:44 AM    MPV 9.9 11/23/2021 02:44 AM    NEUTSPOLYS 72.80 (H) 11/23/2021 02:44 AM    LYMPHOCYTES 17.80 (L) 11/23/2021 02:44 AM    MONOCYTES 5.20 11/23/2021 02:44 AM    EOSINOPHILS 3.30 11/23/2021 02:44 AM    BASOPHILS 0.30 11/23/2021 02:44 AM        Imaging:   CT-HEAD W/O   Final Result         1.  No acute intracranial abnormality is identified, nonspecific changes, most commonly associated with small vessel ischemic disease. Associated moderate cerebral atrophy.   2.  Atherosclerosis.         DX-SKULL-LIMITED 3-   Final Result      Postsurgical change in the cervical spine. Otherwise no evidence of metallic foreign body.      DX-PELVIS-1 OR 2 VIEWS   Final Result      No radiopaque foreign body identified.      DX-CHEST-LIMITED (1 VIEW)   Final Result         Enlarged cardiac silhouette without evidence of acute disease.       No pacemaker.      AX-[id number]-5 VIEW   Final Result      No radiopaque foreign body identified.      IR-US GUIDED PIV   Final Result    Ultrasound-guided PERIPHERAL IV INSERTION performed by    qualified nursing staff as above.      EC-ECHOCARDIOGRAM COMPLETE W/O CONT   Final Result      DX-CHEST-PORTABLE (1 VIEW)   Final Result      Limited exam showing no acute cardiopulmonary disease.      CT-HEAD W/O   Final Result      1.  Extensive atrophy and white matter changes.   2.  No acute intracranial hemorrhage or territorial infarct.   3.  Small chronic LEFT frontal infarct.        Altered mental status  Assessment & Plan  Dementia vs vascular dementia, vs delirium (d/t UTI) less likely as not present initially. Hx of baseline dementia, progressively worsening for the past 6 months. Hx of alcohol use disorder. Hyponatremia on admission has been resolved. Elevated BUN and CPK possible d/t dehydration has resolved.   - B12/Folate, TSH WNL  - CT head- no hematoma, signs of vascular dementia. Repeat CT head ordered for stroke work-up was unchanged from prior.   - CXR unremarkable, BC NGTD.  - UTI treated w/ Ceftriaxone - stopped after 3 days.  - Monitor labs  - PT, OT, SLP evaluation  - Patient has been somnolent possible to Seroquel, decreased dose 6.25 mg. Less somnolent after the change.  - Patient not at her baseline. Postpone Mini-Mental exam tentatively for tomorrow when patient is closer to her baseline.     UTI (urinary tract infection)  Assessment & Plan  11/20/2021: Per nursing urine has a foul odor. Ordered urinalysis, due to collected from used bedpan repeat urinalysis ordered.   11/21/2021: Straight cath ordered. Patient asymptomatic  - Leukocytosis  - Repeat UA: Nitrite neg, Leukocyte esterase positive.  - UCx - gram negative rods.  - C3 (stopped total of 3 days), flowmax  - Straight cath for retention  - No lamb.     Atrial fibrillation with RVR (HCC)  Assessment & Plan  New onset.   - IV metoprolol PRN    - Metoprolol 75 g BID, will titrate as needed.  Rate controlled 80s to 90s.  DPOA was counseled risks as patient has CHADSVAS 7, stroke risk of 15.7% a year  HASBLED score of 3, risk of bleed is 5% a year.   Spoke to DPOA, spoke about risks of anticuagulation of bleeding as pt has hx of falls. vs throbusus. DPOA was counseled on risks vs therapeutic effects of anticoagulation. Agrees with plan to start Anticuagulation with risks of stroke.   Apixaban 2.5 mg BID.  Adjusted for age and weight.  Patient converted to sinus rhythm 11/21/2021    Lactic acidosis  Assessment & Plan  Possible d/t dehydration  - Resolved      Leukocytosis  Assessment & Plan  - 11/22: WBC downtrending, on admission at 16.7  - Likely reactive    Prediabetes  Assessment & Plan  -HbA1C 6.1 in 11/2021  -On ISS, accucheks for hyperglycemia with hypoglycemia protocol discontinued.     Hypokalemia  Assessment & Plan  - Monitor and replete PRN    Vitamin D deficiency  Assessment & Plan  Vitamin D2 50,000 units weekly    Debility  Assessment & Plan  - Dietary consult  - Continue thiamine and MVT  - Discuss with DPOA on tube feeds      Hypernatremia  Assessment & Plan  - Resolved    Chronic alcohol abuse  Assessment & Plan  - No signs of withdrawal    Hypertensive urgency  Assessment & Plan  - Held amlodipine 10mg OD as she was started on metoprolol  - PRN labetolol and enalapril with parameters  - Blood pressure within goal          CODE: DNR/DNI  DISPO: AMS, Mini mental status to do.  Pending UCx.  Discussing options for SNF.

## 2021-11-23 NOTE — THERAPY
Speech Language Pathology  Daily Treatment     Patient Name: Viki García  Age:  86 y.o., Sex:  female  Medical Record #: [id number]  Today's Date: 11/23/2021     Precautions: Fall Risk,Swallow Precautions ( See Comments)  Comments: confused; reports doesn't know who she is    Assessment    Patient was seen on this date for dysphagia treatment. Speech and mentation improved. Patient perseverated on wanting to leave the hospital.    PO trials were administered and consisted of ~10 oz thin liquids (cup/straw) and 4 oz soft and regular solids in mixed consistency form. Onset of swallow trigger was timely and patient had no overt s/sx of aspiration with all trials tested. Voice and breath sounds remained clear. Mastication was mildly prolonged but overall functional for an easy to chew texture. Patient required min-mod A with feeding.    Improved swallow function and appears close to baseline in regards to swallow function. Unknown baseline in regards to cognition. A slightly modified diet is indicated given mild oral dysphagia. SLP following to ensure diet tolerance. A diagnostic swallow study is not indicated at this time but will consider with any concerns for aspiration.     Recommendations  1. Upgrade to an Easy-to-Chew and Thin Liquid diet   -1:1 supervision/assistance with feeding as needed  2. Pills whole with liquid wash  3. Will consider diagnostic swallow study with any concerns for aspiration    Plan    Continue current treatment plan.    Discharge Recommendations: Anticipate that the patient will have no further speech therapy needs after discharge from the hospital     Objective       11/23/21 1339   Vitals   O2 Delivery Device None - Room Air   Dysphagia    Positioning / Behavior Modification Modulate Rate or Bite Size   Other Treatments PO trials of thins and soft/regular solids in mixed consistency form   Diet / Liquid Recommendation Regular (7);Thin (0)   Skilled Intervention Compensatory  Strategies;Verbal Cueing   Recommended Route of Medication Administration   Medication Administration  Whole with Liquid Wash   Short Term Goals   Short Term Goal # 2 NEW 11/18/21: Patient will consume a SB6/MT2 diet with no overt s/sx of aspiration given 1:1 feeding.    Goal Outcome # 2  Goal met, new goal aded   Short Term Goal # 2 B  Patient will consume a RG7/TN0 diet with no overt s/sx of aspiration given assistance with feeding as needed.    Short Term Goal # 3 Patient will consume thin liquids with no overt s/sx of aspiration.    Goal Outcome  # 3 Goal met

## 2021-11-24 LAB
ALBUMIN SERPL BCP-MCNC: 2.7 G/DL (ref 3.2–4.9)
ALBUMIN/GLOB SERPL: 1 G/DL
ALP SERPL-CCNC: 94 U/L (ref 30–99)
ALT SERPL-CCNC: 14 U/L (ref 2–50)
ANION GAP SERPL CALC-SCNC: 8 MMOL/L (ref 7–16)
AST SERPL-CCNC: 14 U/L (ref 12–45)
BACTERIA UR CULT: ABNORMAL
BACTERIA UR CULT: ABNORMAL
BASOPHILS # BLD AUTO: 0.3 % (ref 0–1.8)
BASOPHILS # BLD: 0.04 K/UL (ref 0–0.12)
BILIRUB SERPL-MCNC: 0.2 MG/DL (ref 0.1–1.5)
BUN SERPL-MCNC: 12 MG/DL (ref 8–22)
CALCIUM SERPL-MCNC: 8.3 MG/DL (ref 8.5–10.5)
CHLORIDE SERPL-SCNC: 106 MMOL/L (ref 96–112)
CO2 SERPL-SCNC: 25 MMOL/L (ref 20–33)
CREAT SERPL-MCNC: 0.6 MG/DL (ref 0.5–1.4)
EOSINOPHIL # BLD AUTO: 0.35 K/UL (ref 0–0.51)
EOSINOPHIL NFR BLD: 2.9 % (ref 0–6.9)
ERYTHROCYTE [DISTWIDTH] IN BLOOD BY AUTOMATED COUNT: 47.2 FL (ref 35.9–50)
GLOBULIN SER CALC-MCNC: 2.7 G/DL (ref 1.9–3.5)
GLUCOSE SERPL-MCNC: 157 MG/DL (ref 65–99)
HCT VFR BLD AUTO: 31.7 % (ref 37–47)
HGB BLD-MCNC: 10.5 G/DL (ref 12–16)
IMM GRANULOCYTES # BLD AUTO: 0.08 K/UL (ref 0–0.11)
IMM GRANULOCYTES NFR BLD AUTO: 0.7 % (ref 0–0.9)
LYMPHOCYTES # BLD AUTO: 2.43 K/UL (ref 1–4.8)
LYMPHOCYTES NFR BLD: 19.9 % (ref 22–41)
MAGNESIUM SERPL-MCNC: 2 MG/DL (ref 1.5–2.5)
MCH RBC QN AUTO: 32.1 PG (ref 27–33)
MCHC RBC AUTO-ENTMCNC: 33.1 G/DL (ref 33.6–35)
MCV RBC AUTO: 96.9 FL (ref 81.4–97.8)
MONOCYTES # BLD AUTO: 0.79 K/UL (ref 0–0.85)
MONOCYTES NFR BLD AUTO: 6.5 % (ref 0–13.4)
NEUTROPHILS # BLD AUTO: 8.53 K/UL (ref 2–7.15)
NEUTROPHILS NFR BLD: 69.7 % (ref 44–72)
NRBC # BLD AUTO: 0 K/UL
NRBC BLD-RTO: 0 /100 WBC
PLATELET # BLD AUTO: 280 K/UL (ref 164–446)
PMV BLD AUTO: 9.9 FL (ref 9–12.9)
POTASSIUM SERPL-SCNC: 3.9 MMOL/L (ref 3.6–5.5)
PROT SERPL-MCNC: 5.4 G/DL (ref 6–8.2)
RBC # BLD AUTO: 3.27 M/UL (ref 4.2–5.4)
SIGNIFICANT IND 70042: ABNORMAL
SITE SITE: ABNORMAL
SODIUM SERPL-SCNC: 139 MMOL/L (ref 135–145)
SOURCE SOURCE: ABNORMAL
WBC # BLD AUTO: 12.2 K/UL (ref 4.8–10.8)

## 2021-11-24 PROCEDURE — 700102 HCHG RX REV CODE 250 W/ 637 OVERRIDE(OP): Performed by: STUDENT IN AN ORGANIZED HEALTH CARE EDUCATION/TRAINING PROGRAM

## 2021-11-24 PROCEDURE — A9270 NON-COVERED ITEM OR SERVICE: HCPCS

## 2021-11-24 PROCEDURE — 770001 HCHG ROOM/CARE - MED/SURG/GYN PRIV*

## 2021-11-24 PROCEDURE — 700102 HCHG RX REV CODE 250 W/ 637 OVERRIDE(OP)

## 2021-11-24 PROCEDURE — 93005 ELECTROCARDIOGRAM TRACING: CPT | Performed by: INTERNAL MEDICINE

## 2021-11-24 PROCEDURE — 83735 ASSAY OF MAGNESIUM: CPT

## 2021-11-24 PROCEDURE — 51798 US URINE CAPACITY MEASURE: CPT

## 2021-11-24 PROCEDURE — A9270 NON-COVERED ITEM OR SERVICE: HCPCS | Performed by: STUDENT IN AN ORGANIZED HEALTH CARE EDUCATION/TRAINING PROGRAM

## 2021-11-24 PROCEDURE — A9270 NON-COVERED ITEM OR SERVICE: HCPCS | Performed by: INTERNAL MEDICINE

## 2021-11-24 PROCEDURE — 80053 COMPREHEN METABOLIC PANEL: CPT

## 2021-11-24 PROCEDURE — 99232 SBSQ HOSP IP/OBS MODERATE 35: CPT | Mod: GC | Performed by: INTERNAL MEDICINE

## 2021-11-24 PROCEDURE — 36415 COLL VENOUS BLD VENIPUNCTURE: CPT

## 2021-11-24 PROCEDURE — 97535 SELF CARE MNGMENT TRAINING: CPT

## 2021-11-24 PROCEDURE — 700102 HCHG RX REV CODE 250 W/ 637 OVERRIDE(OP): Performed by: INTERNAL MEDICINE

## 2021-11-24 PROCEDURE — 85025 COMPLETE CBC W/AUTO DIFF WBC: CPT

## 2021-11-24 RX ORDER — QUETIAPINE FUMARATE 25 MG/1
6.25 TABLET, FILM COATED ORAL 2 TIMES DAILY
Status: DISCONTINUED | OUTPATIENT
Start: 2021-11-24 | End: 2021-11-24

## 2021-11-24 RX ORDER — QUETIAPINE FUMARATE 25 MG/1
12.5 TABLET, FILM COATED ORAL 2 TIMES DAILY PRN
Status: DISCONTINUED | OUTPATIENT
Start: 2021-11-24 | End: 2021-11-25

## 2021-11-24 RX ADMIN — DOCUSATE SODIUM 50 MG AND SENNOSIDES 8.6 MG 2 TABLET: 8.6; 5 TABLET, FILM COATED ORAL at 04:58

## 2021-11-24 RX ADMIN — QUETIAPINE FUMARATE 12.5 MG: 25 TABLET ORAL at 16:54

## 2021-11-24 RX ADMIN — METOPROLOL TARTRATE 50 MG: 50 TABLET, FILM COATED ORAL at 04:58

## 2021-11-24 RX ADMIN — DOCUSATE SODIUM 50 MG AND SENNOSIDES 8.6 MG 2 TABLET: 8.6; 5 TABLET, FILM COATED ORAL at 18:00

## 2021-11-24 RX ADMIN — MULTIPLE VITAMINS W/ MINERALS TAB 1 TABLET: TAB at 04:58

## 2021-11-24 RX ADMIN — APIXABAN 2.5 MG: 5 TABLET, FILM COATED ORAL at 18:00

## 2021-11-24 RX ADMIN — APIXABAN 2.5 MG: 5 TABLET, FILM COATED ORAL at 04:59

## 2021-11-24 RX ADMIN — TAMSULOSIN HYDROCHLORIDE 0.4 MG: 0.4 CAPSULE ORAL at 17:09

## 2021-11-24 RX ADMIN — METOPROLOL TARTRATE 50 MG: 50 TABLET, FILM COATED ORAL at 18:00

## 2021-11-24 RX ADMIN — Medication 100 MG: at 04:58

## 2021-11-24 ASSESSMENT — COGNITIVE AND FUNCTIONAL STATUS - GENERAL
TOILETING: A LOT
HELP NEEDED FOR BATHING: A LOT
DRESSING REGULAR UPPER BODY CLOTHING: A LOT
EATING MEALS: A LITTLE
DRESSING REGULAR LOWER BODY CLOTHING: A LOT
SUGGESTED CMS G CODE MODIFIER DAILY ACTIVITY: CK
DAILY ACTIVITIY SCORE: 14
PERSONAL GROOMING: A LITTLE

## 2021-11-24 ASSESSMENT — FIBROSIS 4 INDEX: FIB4 SCORE: 1.15

## 2021-11-24 ASSESSMENT — PAIN SCALES - WONG BAKER
WONGBAKER_NUMERICALRESPONSE: DOESN'T HURT AT ALL
WONGBAKER_NUMERICALRESPONSE: DOESN'T HURT AT ALL

## 2021-11-24 NOTE — THERAPY
"Occupational Therapy  Daily Treatment     Patient Name: Viki García  Age:  86 y.o., Sex:  female  Medical Record #: [id number]  Today's Date: 11/24/2021     Precautions  Precautions: Fall Risk,Swallow Precautions ( See Comments)  Comments: confused    Assessment    Pt seen for OT session. Progressing with txfs and activity tolerance; continues to be confused, but more cooperative this session with improved verbalization and command following. Cursing at one point in Omani; may be why sometimes is incoherent. BSC<>chair txf with mod A and seated g/h and drinking tea with min A. Psychosocial intervention about fear of falling limiting her progress, being alone in room, and having no one to visit or help her. Reassured her that we are all here to help her and to get her back to baseline. Continues to be limited by decreased functional mobility, activity tolerance, cognition, strength, AROM, balance, and pain which are currently affecting pt's ability to complete ADLs/IADLs at baseline. Will continue to follow.     Plan    Continue current treatment plan.    DC Equipment Recommendations: Unable to determine at this time  Discharge Recommendations: Recommend post-acute placement for additional occupational therapy services prior to discharge home    Subjective    \"I'm just all alone. That's the worst part.\"     Objective     11/24/21 1126   Precautions   Precautions Fall Risk;Swallow Precautions ( See Comments)   Comments confused   Vitals   O2 Delivery Device None - Room Air   Pain 0 - 10 Group   Therapist Pain Assessment Post Activity Pain Same as Prior to Activity;During Activity;Nurse Notified  (no c/o pain)   Cognition    Cognition / Consciousness X   Speech/ Communication Delayed Responses  (clearer words today; cursing in O[place]i)   Level of Consciousness Alert   Ability To Follow Commands 1 Step   Safety Awareness Impaired;Impulsive   New Learning Impaired   Attention Impaired   Sequencing Impaired   Initiation " Impaired   Comments cooperative with encouragement. more receptive to reorientation today. Continues to be confused   Passive ROM Upper Body   Passive ROM Upper Body WDL   Active ROM Upper Body   Active ROM Upper Body  WDL   Comments functional for ADLs   Strength Upper Body   Upper Body Strength  X   Gross Strength Generalized Weakness, Equal Bilaterally.    Neuro-Muscular Treatments   Neuro-Muscular Treatments Weight Shift Right;Weight Shift Left;Anterior weight shift;Postural Facilitation;Postural Changes;Facilitation;Verbal Cuing;Tactile Cuing   Comments in standing with FWW: req encouragement and reassurance   Other Treatments   Other Treatments Provided Psychosocial intervention about fear of falling, being alone in room, and having no one to visit or help her. Reassured her that we are all here to help her and to get her better.    Balance   Sitting Balance (Static) Fair   Sitting Balance (Dynamic) Fair -   Standing Balance (Static) Poor -   Standing Balance (Dynamic) Trace +   Weight Shift Sitting Fair   Weight Shift Standing Poor   Skilled Intervention Verbal Cuing;Tactile Cuing;Facilitation;Compensatory Strategies;Sequencing;Postural Facilitation   Comments W/FWW for STS then HHA for txf to BSC x2   Bed Mobility    Scooting Moderate Assist   Skilled Intervention Verbal Cuing;Tactile Cuing;Facilitation;Compensatory Strategies   Comments found and left in chair   Activities of Daily Living   Eating Supervision  (drinking water from cup with lid)   Grooming Minimal Assist;Seated  (wiping face with cloth)   Lower Body Dressing Maximal Assist  (socks)   Toileting Maximal Assist  (purewick; reported need for BMx2, but then unable on BSC)   Skilled Intervention Verbal Cuing;Tactile Cuing;Facilitation;Compensatory Strategies;Sequencing   Comments limited by cognition and confusion   How much help from another person does the patient currently need...   Putting on and taking off regular lower body clothing? 2    Bathing (including washing, rinsing, and drying)? 2   Toileting, which includes using a toilet, bedpan, or urinal? 2   Putting on and taking off regular upper body clothing? 2   Taking care of personal grooming such as brushing teeth? 3   Eating meals? 3   6 Clicks Daily Activity Score 14   Functional Mobility   Sit to Stand Moderate Assist   Bed, Chair, Wheelchair Transfer Moderate Assist   Toilet Transfers Moderate Assist  (elevated BSC)   Transfer Method Stand Step   Mobility chair>STS w/FWW>chair>BSC w/HHA>chair>BSC>chair   Skilled Intervention Verbal Cuing;Tactile Cuing;Facilitation;Compensatory Strategies;Sequencing   Activity Tolerance   Comments found and left in chair; limited by cognition and fatigue   Patient / Family Goals   Patient / Family Goal #1 unable to state   Short Term Goals   Short Term Goal # 1 seated g/h with min A   Goal Outcome # 1 Progressing as expected   Short Term Goal # 2 sit unsupported at EOB for >3 min in prep for LB dressing   Goal Outcome # 2 Progressing as expected   Short Term Goal # 3 BSC txf with mod A   Goal Outcome # 3 Goal met, new goal added   Short Term Goal # 3 B BSC txf with SPV   Education Group   Education Provided Role of Occupational Therapist;Transfers;Activities of Daily Living;Pathology of bedrest   Role of Occupational Therapist Patient Response Patient;Acceptance;Explanation;Reinforcement Needed;No Learning Evidence   Transfers Patient Response Patient;Acceptance;Explanation;Action Demonstration;Reinforcement Needed   ADL Patient Response Patient;Acceptance;Explanation;Reinforcement Needed;No Learning Evidence   Pathology of Bedrest Patient Response Patient;Acceptance;Explanation;Reinforcement Needed;No Learning Evidence

## 2021-11-24 NOTE — DISCHARGE PLANNING
Anticipated Discharge Disposition: [facility]- Memory Unit    Action: CM visited with patient at bedside. Patient oriented to person and answered simple questions. Patient pleasant during interaction. [facility] are able [facility] patient on [facility]/21. Lizzy to fax admission packet. CM received packet and notified attending. Patient will need TB and COVID-attending aware. Patient is only allowed to be on oral medications - no suppositories. CM to set up [facility] transport on Monday for Tuesday admission to [facility].     Barriers to Discharge: Admission packet to be completed by attending and transportation.    Plan: CM to fax completed admission packet and coordinate [facility] transportation.

## 2021-11-24 NOTE — PROGRESS NOTES
Daily Progress Note:     Date of Service: 11/24/2021  Primary Team: [facility] IM Blue Team   Attending: Dr. Dooley.   Senior Resident: Dr. Nolen   Intern: Dr. Russ  Contact:  [phone]    Chief Complaint:   AMS, Hypernatremia    ID: Patient is an 86-year-old female with a significant history of baseline dementia (AAO x1), who was admitted on 11/12 for altered mental status. Developed Afib RVR while hospitalized, converted to sinus 2 AM 11/21/2021.     Interval Hx:  No acute events reported overnight. This morning patient is more alert and awake. Patient is oriented x2 to name and place. Was given Seroquel at low dose last night. Antibiotics were stopped yesterday. Will plan to do a mini mental exam on patient today in afternoon as she is more alert at that time.       Consultants/Specialty:  SLP  PT/OT    Review of Systems:    Review of Systems   Unable to perform ROS: Mental acuity       Objective Data:   Physical Exam:   Vitals:   Temp:  [36.6 °C (97.8 °F)-37.6 °C (99.7 °F)] 37.6 °C (99.7 °F)  Pulse:  [47-76] 47  Resp:  [16-18] 16  BP: (106-157)/(58-98) 134/76  SpO2:  [91 %-96 %] 91 %     PHYSICAL EXAM: Limited because of AMS  Physical Exam  Constitutional:       Interventions: She is sedated.      Comments: Awake, better responsive to questions.    HENT:      Head: Normocephalic and atraumatic.      Mouth/Throat:      Mouth: Mucous membranes are dry.   Cardiovascular:      Rate and Rhythm: Normal rate and regular rhythm.      Pulses: Normal pulses.      Heart sounds: Normal heart sounds.   Pulmonary:      Effort: No respiratory distress.      Breath sounds: Normal breath sounds. No stridor. No wheezing or rales.   Abdominal:      General: Bowel sounds are normal.      Palpations: Abdomen is soft. There is no mass.      Tenderness: There is no abdominal tenderness. There is no guarding.   Musculoskeletal:         General: No swelling or tenderness.   Skin:     General: Skin is warm and dry.      Capillary  Refill: Capillary refill takes less than 2 seconds.   Neurological:      Mental Status: She is alert. She is disoriented.      Cranial Nerves: No dysarthria or facial asymmetry.      Motor: No tremor.      Comments: Oriented x2 to name and place. Not oriented to time.        Labs:   Lab Results   Component Value Date/Time    SODIUM 139 11/24/2021 01:49 AM    POTASSIUM 3.9 11/24/2021 01:49 AM    CHLORIDE 106 11/24/2021 01:49 AM    CO2 25 11/24/2021 01:49 AM    GLUCOSE 157 (H) 11/24/2021 01:49 AM    BUN 12 11/24/2021 01:49 AM    CREATININE 0.60 11/24/2021 01:49 AM    CREATININE 0.7 02/25/2008 06:05 AM    BUNCREATRAT 19 06/20/2016 10:28 AM      Lab Results   Component Value Date/Time    PROTHROMBTM 11.2 02/19/2008 05:30 AM    INR 0.97 02/19/2008 05:30 AM      Lab Results   Component Value Date/Time    WBC 12.2 (H) 11/24/2021 01:49 AM    RBC 3.27 (L) 11/24/2021 01:49 AM    HEMOGLOBIN 10.5 (L) 11/24/2021 01:49 AM    HEMATOCRIT 31.7 (L) 11/24/2021 01:49 AM    MCV 96.9 11/24/2021 01:49 AM    MCH 32.1 11/24/2021 01:49 AM    MCHC 33.1 (L) 11/24/2021 01:49 AM    MPV 9.9 11/24/2021 01:49 AM    NEUTSPOLYS 69.70 11/24/2021 01:49 AM    LYMPHOCYTES 19.90 (L) 11/24/2021 01:49 AM    MONOCYTES 6.50 11/24/2021 01:49 AM    EOSINOPHILS 2.90 11/24/2021 01:49 AM    BASOPHILS 0.30 11/24/2021 01:49 AM        Imaging:   CT-HEAD W/O   Final Result         1.  No acute intracranial abnormality is identified, nonspecific changes, most commonly associated with small vessel ischemic disease. Associated moderate cerebral atrophy.   2.  Atherosclerosis.         DX-SKULL-LIMITED 3-   Final Result      Postsurgical change in the cervical spine. Otherwise no evidence of metallic foreign body.      DX-PELVIS-1 OR 2 VIEWS   Final Result      No radiopaque foreign body identified.      DX-CHEST-LIMITED (1 VIEW)   Final Result         Enlarged cardiac silhouette without evidence of acute disease.      No pacemaker.      QT-[id number]-4 VIEW   Final Result       No radiopaque foreign body identified.      IR-US GUIDED PIV   Final Result    Ultrasound-guided PERIPHERAL IV INSERTION performed by    qualified nursing staff as above.      EC-ECHOCARDIOGRAM COMPLETE W/O CONT   Final Result      DX-CHEST-PORTABLE (1 VIEW)   Final Result      Limited exam showing no acute cardiopulmonary disease.      CT-HEAD W/O   Final Result      1.  Extensive atrophy and white matter changes.   2.  No acute intracranial hemorrhage or territorial infarct.   3.  Small chronic LEFT frontal infarct.        Altered mental status  Assessment & Plan  Dementia vs vascular dementia, vs delirium (d/t UTI) less likely as not present initially. Hx of baseline dementia, progressively worsening for the past 6 months. Hx of alcohol use disorder. Hyponatremia on admission has been resolved. Elevated BUN and CPK possible d/t dehydration has resolved.   - B12/Folate, TSH WNL  - CT head- no hematoma, signs of vascular dementia. Repeat CT head ordered for stroke work-up was unchanged from prior.   - CXR unremarkable, BC NGTD.  - UTI treated w/ Ceftriaxone - stopped after 3 days.  - Monitor labs  - PT, OT, SLP evaluation  - Patient has been somnolent possible to Seroquel, decreased dose 6.25 mg. Less somnolent after the change.  - Patient more at her baseline. Plan to do Mini-Mental exam.    UTI (urinary tract infection)  Assessment & Plan  11/20/2021: Per nursing urine has a foul odor. Ordered urinalysis, due to collected from used bedpan repeat urinalysis ordered.   11/21/2021: Straight cath ordered. Patient asymptomatic  - Leukocytosis  - Repeat UA: Nitrite neg, Leukocyte esterase positive.  - UCx - gram negative rods.  - C3 (stopped total of 3 days), flowmax  - Straight cath for retention  - No lamb.     Atrial fibrillation with RVR (HCC)  Assessment & Plan  New onset.   - IV metoprolol PRN   - Metoprolol 75 g BID, will titrate as needed.  Rate controlled 80s to 90s.  DPOA was counseled risks as patient has  CHADSVAS 7, stroke risk of 15.7% a year  HASBLED score of 3, risk of bleed is 5% a year.   Spoke to DPOA, spoke about risks of anticuagulation of bleeding as pt has hx of falls. vs throbusus. DPOA was counseled on risks vs therapeutic effects of anticoagulation. Agrees with plan to start Anticuagulation with risks of stroke.   Apixaban 2.5 mg BID.  Adjusted for age and weight.  Patient converted to sinus rhythm 11/21/2021    Lactic acidosis  Assessment & Plan  Possible d/t dehydration  - Resolved      Leukocytosis  Assessment & Plan  - 11/22: WBC downtrending, on admission at 16.7  - Likely reactive    Prediabetes  Assessment & Plan  -HbA1C 6.1 in 11/2021  -On ISS, accucheks for hyperglycemia with hypoglycemia protocol discontinued.     Hypokalemia  Assessment & Plan  - Monitor and replete PRN    Vitamin D deficiency  Assessment & Plan  Vitamin D2 50,000 units weekly    Debility  Assessment & Plan  - Dietary consult  - Continue thiamine and MVT  - Discuss with DPOA on tube feeds      Hypernatremia  Assessment & Plan  - Resolved    Chronic alcohol abuse  Assessment & Plan  - No signs of withdrawal    Hypertensive urgency  Assessment & Plan  - Held amlodipine 10mg OD as she was started on metoprolol  - PRN labetolol and enalapril with parameters  - Blood pressure within goal          CODE: DNR/DNI  DISPO: AMS, Mini mental status to do.  Pending UCx.  Discussing options for SNF.

## 2021-11-25 LAB
ALBUMIN SERPL BCP-MCNC: 2.5 G/DL (ref 3.2–4.9)
ALBUMIN/GLOB SERPL: 0.9 G/DL
ALP SERPL-CCNC: 101 U/L (ref 30–99)
ALT SERPL-CCNC: 17 U/L (ref 2–50)
ANION GAP SERPL CALC-SCNC: 6 MMOL/L (ref 7–16)
AST SERPL-CCNC: 21 U/L (ref 12–45)
BACTERIA UR CULT: ABNORMAL
BACTERIA UR CULT: ABNORMAL
BASOPHILS # BLD AUTO: 0.4 % (ref 0–1.8)
BASOPHILS # BLD: 0.04 K/UL (ref 0–0.12)
BILIRUB SERPL-MCNC: 0.2 MG/DL (ref 0.1–1.5)
BUN SERPL-MCNC: 15 MG/DL (ref 8–22)
CALCIUM SERPL-MCNC: 8.4 MG/DL (ref 8.5–10.5)
CHLORIDE SERPL-SCNC: 106 MMOL/L (ref 96–112)
CO2 SERPL-SCNC: 25 MMOL/L (ref 20–33)
CREAT SERPL-MCNC: 0.64 MG/DL (ref 0.5–1.4)
EKG IMPRESSION: NORMAL
EOSINOPHIL # BLD AUTO: 0.49 K/UL (ref 0–0.51)
EOSINOPHIL NFR BLD: 4.4 % (ref 0–6.9)
ERYTHROCYTE [DISTWIDTH] IN BLOOD BY AUTOMATED COUNT: 48.5 FL (ref 35.9–50)
GLOBULIN SER CALC-MCNC: 2.8 G/DL (ref 1.9–3.5)
GLUCOSE SERPL-MCNC: 132 MG/DL (ref 65–99)
HCT VFR BLD AUTO: 32.5 % (ref 37–47)
HGB BLD-MCNC: 10.4 G/DL (ref 12–16)
IMM GRANULOCYTES # BLD AUTO: 0.06 K/UL (ref 0–0.11)
IMM GRANULOCYTES NFR BLD AUTO: 0.5 % (ref 0–0.9)
LYMPHOCYTES # BLD AUTO: 2.54 K/UL (ref 1–4.8)
LYMPHOCYTES NFR BLD: 22.8 % (ref 22–41)
MAGNESIUM SERPL-MCNC: 1.9 MG/DL (ref 1.5–2.5)
MCH RBC QN AUTO: 31.4 PG (ref 27–33)
MCHC RBC AUTO-ENTMCNC: 32 G/DL (ref 33.6–35)
MCV RBC AUTO: 98.2 FL (ref 81.4–97.8)
MONOCYTES # BLD AUTO: 0.76 K/UL (ref 0–0.85)
MONOCYTES NFR BLD AUTO: 6.8 % (ref 0–13.4)
NEUTROPHILS # BLD AUTO: 7.26 K/UL (ref 2–7.15)
NEUTROPHILS NFR BLD: 65.1 % (ref 44–72)
NRBC # BLD AUTO: 0 K/UL
NRBC BLD-RTO: 0 /100 WBC
PLATELET # BLD AUTO: 288 K/UL (ref 164–446)
PMV BLD AUTO: 9.9 FL (ref 9–12.9)
POTASSIUM SERPL-SCNC: 4.1 MMOL/L (ref 3.6–5.5)
PROT SERPL-MCNC: 5.3 G/DL (ref 6–8.2)
RBC # BLD AUTO: 3.31 M/UL (ref 4.2–5.4)
SIGNIFICANT IND 70042: ABNORMAL
SITE SITE: ABNORMAL
SODIUM SERPL-SCNC: 137 MMOL/L (ref 135–145)
SOURCE SOURCE: ABNORMAL
WBC # BLD AUTO: 11.2 K/UL (ref 4.8–10.8)

## 2021-11-25 PROCEDURE — 80053 COMPREHEN METABOLIC PANEL: CPT

## 2021-11-25 PROCEDURE — 36415 COLL VENOUS BLD VENIPUNCTURE: CPT

## 2021-11-25 PROCEDURE — 700102 HCHG RX REV CODE 250 W/ 637 OVERRIDE(OP): Performed by: STUDENT IN AN ORGANIZED HEALTH CARE EDUCATION/TRAINING PROGRAM

## 2021-11-25 PROCEDURE — 770006 HCHG ROOM/CARE - MED/SURG/GYN SEMI*

## 2021-11-25 PROCEDURE — 99232 SBSQ HOSP IP/OBS MODERATE 35: CPT | Mod: GC | Performed by: INTERNAL MEDICINE

## 2021-11-25 PROCEDURE — 83735 ASSAY OF MAGNESIUM: CPT

## 2021-11-25 PROCEDURE — A9270 NON-COVERED ITEM OR SERVICE: HCPCS | Performed by: STUDENT IN AN ORGANIZED HEALTH CARE EDUCATION/TRAINING PROGRAM

## 2021-11-25 PROCEDURE — A9270 NON-COVERED ITEM OR SERVICE: HCPCS

## 2021-11-25 PROCEDURE — 700111 HCHG RX REV CODE 636 W/ 250 OVERRIDE (IP): Performed by: STUDENT IN AN ORGANIZED HEALTH CARE EDUCATION/TRAINING PROGRAM

## 2021-11-25 PROCEDURE — 51798 US URINE CAPACITY MEASURE: CPT

## 2021-11-25 PROCEDURE — 85025 COMPLETE CBC W/AUTO DIFF WBC: CPT

## 2021-11-25 PROCEDURE — 93010 ELECTROCARDIOGRAM REPORT: CPT | Performed by: INTERNAL MEDICINE

## 2021-11-25 PROCEDURE — 700102 HCHG RX REV CODE 250 W/ 637 OVERRIDE(OP)

## 2021-11-25 RX ORDER — MAGNESIUM SULFATE 1 G/100ML
1 INJECTION INTRAVENOUS ONCE
Status: COMPLETED | OUTPATIENT
Start: 2021-11-25 | End: 2021-11-25

## 2021-11-25 RX ORDER — QUETIAPINE FUMARATE 25 MG/1
12.5 TABLET, FILM COATED ORAL NIGHTLY PRN
Status: DISCONTINUED | OUTPATIENT
Start: 2021-11-25 | End: 2021-11-25

## 2021-11-25 RX ORDER — QUETIAPINE FUMARATE 25 MG/1
12.5 TABLET, FILM COATED ORAL 2 TIMES DAILY PRN
Status: DISCONTINUED | OUTPATIENT
Start: 2021-11-25 | End: 2021-11-26

## 2021-11-25 RX ADMIN — APIXABAN 2.5 MG: 5 TABLET, FILM COATED ORAL at 17:59

## 2021-11-25 RX ADMIN — METOPROLOL TARTRATE 50 MG: 50 TABLET, FILM COATED ORAL at 17:58

## 2021-11-25 RX ADMIN — DOCUSATE SODIUM 50 MG AND SENNOSIDES 8.6 MG 2 TABLET: 8.6; 5 TABLET, FILM COATED ORAL at 17:59

## 2021-11-25 RX ADMIN — QUETIAPINE FUMARATE 12.5 MG: 25 TABLET ORAL at 12:29

## 2021-11-25 RX ADMIN — METOPROLOL TARTRATE 50 MG: 50 TABLET, FILM COATED ORAL at 06:05

## 2021-11-25 RX ADMIN — APIXABAN 2.5 MG: 5 TABLET, FILM COATED ORAL at 06:05

## 2021-11-25 RX ADMIN — MULTIPLE VITAMINS W/ MINERALS TAB 1 TABLET: TAB at 06:05

## 2021-11-25 RX ADMIN — Medication 100 MG: at 06:05

## 2021-11-25 RX ADMIN — QUETIAPINE FUMARATE 12.5 MG: 25 TABLET ORAL at 23:46

## 2021-11-25 RX ADMIN — MAGNESIUM SULFATE HEPTAHYDRATE 1 G: 1 INJECTION, SOLUTION INTRAVENOUS at 14:15

## 2021-11-25 ASSESSMENT — PAIN DESCRIPTION - PAIN TYPE: TYPE: ACUTE PAIN

## 2021-11-25 ASSESSMENT — FIBROSIS 4 INDEX: FIB4 SCORE: 1.52

## 2021-11-25 NOTE — PROGRESS NOTES
Bladder scanned pt per MD order. Pt retaining 511mL. Straight cathed pt per order. Will recheck in 4 hours.

## 2021-11-25 NOTE — PROGRESS NOTES
Upon assessment pt rhythm irregular. Pt has history of AFIB but last monitor summary before patient became medical on 11/23 showed SB/SR. Ordered stat EKG to verify rhythm and paged [facility] blue on call resident. Waiting to hear from MD.

## 2021-11-25 NOTE — PROGRESS NOTES
"Assisted pt to BSC to try to void, pt unable to void. Pt started becoming increasingly agitated wanting writer to \"hurry up and do whatever it is your going to do!!\" Assisted pt back to bed with max assist. Straight cath completed per orders with 700ml urine clear yellow urine out. Pt agitated yelling at writer to \"hurry up\". After cath complete pt continued to become more agitated yelling at writer to \"get out\" and \"get away from me\" and yelling out \"help\" Pt swatted at nurse 3 times. Informed pt that hitting staff is not acceptable behavior, pt screamed at nurse \"get out!\"  "

## 2021-11-25 NOTE — PROGRESS NOTES
Assumed care of patient @1915, beside report received from Manuela RN, Pt A&OX2 disoriented to time and situation and denies any pain. Bed locked an lowered with call light in reach and questions answered in present time.

## 2021-11-25 NOTE — PROGRESS NOTES
Pt yelling and calling out for help repeatedly, yelling at staff  when they come into room. Pt agitated and confused, difficult to reorient due too agitation. Request for medication to help calm pt made with MD. New orders received.

## 2021-11-25 NOTE — PROGRESS NOTES
Daily Progress Note:     Date of Service: 11/25/2021  Primary Team: [facility] IM Blue Team   Attending: Dr. Dooley.   Senior Resident: Dr. Nolen   Intern: Dr. Russ  Contact:  [phone]    Chief Complaint:   AMS, Hypernatremia    ID: Patient is an 86-year-old female with a significant history of baseline dementia (AAO x1), who was admitted on 11/12 for altered mental status. Developed Afib RVR while hospitalized, converted to sinus 2 AM 11/21/2021.     Interval Hx:  Patient was agitated per nurse. Seroquil will be increased to 12.5 mg BID. This morning patient is more alert and awake but still disoriented Patient is oriented x1 to name only. Mini mental exam showed a score of 6/30, severe dementia.  Patient is scheduled to go to [facility] after discussing with POA and .      Consultants/Specialty:  SLP  PT/OT    Review of Systems:    Review of Systems   Unable to perform ROS: Mental acuity       Objective Data:   Physical Exam:   Vitals:   Temp:  [36.2 °C (97.2 °F)-37.6 °C (99.7 °F)] 36.2 °C (97.2 °F)  Pulse:  [55-83] 55  Resp:  [16-20] 16  BP: (103-138)/(54-74) 138/74  SpO2:  [94 %-95 %] 95 %     PHYSICAL EXAM: Limited because of AMS  Physical Exam  Constitutional:       Interventions: She is sedated.      Comments: Awake, better responsive to questions.    HENT:      Head: Normocephalic and atraumatic.      Mouth/Throat:      Mouth: Mucous membranes are dry.   Cardiovascular:      Rate and Rhythm: Normal rate and regular rhythm.      Pulses: Normal pulses.      Heart sounds: Normal heart sounds.   Pulmonary:      Effort: No respiratory distress.      Breath sounds: Normal breath sounds. No stridor. No wheezing or rales.   Abdominal:      General: Bowel sounds are normal.      Palpations: Abdomen is soft. There is no mass.      Tenderness: There is no abdominal tenderness. There is no guarding.   Musculoskeletal:         General: No swelling or tenderness.   Skin:     General: Skin is  warm and dry.      Capillary Refill: Capillary refill takes less than 2 seconds.   Neurological:      Mental Status: She is alert. She is disoriented.      Cranial Nerves: No dysarthria or facial asymmetry.      Motor: No tremor.      Comments: Oriented x1 to name only.        Labs:   Lab Results   Component Value Date/Time    SODIUM 137 11/25/2021 01:02 AM    POTASSIUM 4.1 11/25/2021 01:02 AM    CHLORIDE 106 11/25/2021 01:02 AM    CO2 25 11/25/2021 01:02 AM    GLUCOSE 132 (H) 11/25/2021 01:02 AM    BUN 15 11/25/2021 01:02 AM    CREATININE 0.64 11/25/2021 01:02 AM    CREATININE 0.7 02/25/2008 06:05 AM    BUNCREATRAT 19 06/20/2016 10:28 AM      Lab Results   Component Value Date/Time    PROTHROMBTM 11.2 02/19/2008 05:30 AM    INR 0.97 02/19/2008 05:30 AM      Lab Results   Component Value Date/Time    WBC 11.2 (H) 11/25/2021 01:02 AM    RBC 3.31 (L) 11/25/2021 01:02 AM    HEMOGLOBIN 10.4 (L) 11/25/2021 01:02 AM    HEMATOCRIT 32.5 (L) 11/25/2021 01:02 AM    MCV 98.2 (H) 11/25/2021 01:02 AM    MCH 31.4 11/25/2021 01:02 AM    MCHC 32.0 (L) 11/25/2021 01:02 AM    MPV 9.9 11/25/2021 01:02 AM    NEUTSPOLYS 65.10 11/25/2021 01:02 AM    LYMPHOCYTES 22.80 11/25/2021 01:02 AM    MONOCYTES 6.80 11/25/2021 01:02 AM    EOSINOPHILS 4.40 11/25/2021 01:02 AM    BASOPHILS 0.40 11/25/2021 01:02 AM        Imaging:   CT-HEAD W/O   Final Result         1.  No acute intracranial abnormality is identified, nonspecific changes, most commonly associated with small vessel ischemic disease. Associated moderate cerebral atrophy.   2.  Atherosclerosis.         DX-SKULL-LIMITED 3-   Final Result      Postsurgical change in the cervical spine. Otherwise no evidence of metallic foreign body.      DX-PELVIS-1 OR 2 VIEWS   Final Result      No radiopaque foreign body identified.      DX-CHEST-LIMITED (1 VIEW)   Final Result         Enlarged cardiac silhouette without evidence of acute disease.      No pacemaker.      NR-[id number]-1 VIEW   Final Result       No radiopaque foreign body identified.      IR-US GUIDED PIV   Final Result    Ultrasound-guided PERIPHERAL IV INSERTION performed by    qualified nursing staff as above.      EC-ECHOCARDIOGRAM COMPLETE W/O CONT   Final Result      DX-CHEST-PORTABLE (1 VIEW)   Final Result      Limited exam showing no acute cardiopulmonary disease.      CT-HEAD W/O   Final Result      1.  Extensive atrophy and white matter changes.   2.  No acute intracranial hemorrhage or territorial infarct.   3.  Small chronic LEFT frontal infarct.          Patient is an 86-year-old female with a significant history of baseline dementia (AAO x1), developed Afib RVR while hospitalized, converted to sinus 2 AM 11/21/2021. Mini mental exam shows severe dementia, will be transferred to [facility] on 11/30/21. Plan was discussed with POA and .     Altered mental status  Assessment & Plan  Dementia vs vascular dementia, vs delirium (d/t UTI) less likely as not present initially. Hx of baseline dementia, progressively worsening for the past 6 months. Hx of alcohol use disorder. Hyponatremia on admission has been resolved. Elevated BUN and CPK possible d/t dehydration has resolved.   - B12/Folate, TSH WNL  - CT head- no hematoma, signs of vascular dementia. Repeat CT head ordered for stroke work-up was unchanged from prior.   - CXR unremarkable, BC NGTD.  - UTI treated w/ Ceftriaxone - stopped after 3 days.  - Monitor labs  - PT, OT, SLP evaluation  - Patient has been somnolent possible to Seroquel, decreased dose 6.25 mg. Less somnolent after the change to perform mini mental exam.  - Patient more at her baseline. Mini-mental exam result was 6/30, severe dementia.  - Patient became agitated and screaming per nurse and observed in afternoon during mini mental exam.  - Seroquel increased to 12.5 BID.  - Will monitor patient.  - Plan to go to [facility] on 11/30/21    UTI (urinary tract infection)  Assessment &  Plan  11/20/2021: Per nursing urine has a foul odor. Ordered urinalysis, due to collected from used bedpan repeat urinalysis ordered.   11/21/2021: Straight cath ordered. Patient asymptomatic  - Leukocytosis  - Repeat UA: Nitrite neg, Leukocyte esterase positive.  - UCx - gram negative rods.  - C3 (stopped total of 3 days), flowmax  - Straight cath for retention  - No lamb.     Atrial fibrillation with RVR (HCC)  Assessment & Plan  New onset.   - IV metoprolol PRN   - Metoprolol 75 g BID, will titrate as needed.  Rate controlled 80s to 90s.  DPOA was counseled risks as patient has CHADSVAS 7, stroke risk of 15.7% a year  HASBLED score of 3, risk of bleed is 5% a year.   Spoke to DPOA, spoke about risks of anticuagulation of bleeding as pt has hx of falls. vs throbusus. DPOA was counseled on risks vs therapeutic effects of anticoagulation. Agrees with plan to start Anticuagulation with risks of stroke.   Apixaban 2.5 mg BID.  Adjusted for age and weight.  Patient converted to sinus rhythm 11/21/2021    Lactic acidosis  Assessment & Plan  Possible d/t dehydration  - Resolved      Leukocytosis  Assessment & Plan  - 11/22: WBC downtrending, on admission at 16.7  - Likely reactive    Prediabetes  Assessment & Plan  -HbA1C 6.1 in 11/2021  -On ISS, accucheks for hyperglycemia with hypoglycemia protocol discontinued.     Hypokalemia  Assessment & Plan  - Monitor and replete PRN    Vitamin D deficiency  Assessment & Plan  Vitamin D2 50,000 units weekly    Debility  Assessment & Plan  - Dietary consult  - Continue thiamine and MVT  - Discuss with DPOA on tube feeds      Hypernatremia  Assessment & Plan  - Resolved    Chronic alcohol abuse  Assessment & Plan  - No signs of withdrawal    Hypertensive urgency  Assessment & Plan  - Held amlodipine 10mg OD as she was started on metoprolol  - PRN labetolol and enalapril with parameters  - Blood pressure within goal          CODE: DNR/DNI  DISPO: AMS, Mini mental status to do.   Pending UCx.  Discussing options for SNF.

## 2021-11-25 NOTE — PROGRESS NOTES
[facility] residents informed of patient being straight cathed for 3 days now, multiple times a day as bladder scans show over 500cc's each time. Residents informed that this can be very traumatic for the patient as she is confused and increases her agitation every time bedside nursing does it. Per policy, straight cath should be done 2-3 times before lamb placed.     Bedside RN and Charge RN both asking for lamb placement. No order received. Will continue to bladder scan and straight cath Q8 hours.

## 2021-11-26 PROBLEM — Z79.899 DVT PROPHYLAXIS: Status: RESOLVED | Noted: 2021-11-14 | Resolved: 2021-11-26

## 2021-11-26 PROCEDURE — 92526 ORAL FUNCTION THERAPY: CPT

## 2021-11-26 PROCEDURE — 700102 HCHG RX REV CODE 250 W/ 637 OVERRIDE(OP)

## 2021-11-26 PROCEDURE — A9270 NON-COVERED ITEM OR SERVICE: HCPCS | Performed by: STUDENT IN AN ORGANIZED HEALTH CARE EDUCATION/TRAINING PROGRAM

## 2021-11-26 PROCEDURE — 99232 SBSQ HOSP IP/OBS MODERATE 35: CPT | Performed by: INTERNAL MEDICINE

## 2021-11-26 PROCEDURE — 700102 HCHG RX REV CODE 250 W/ 637 OVERRIDE(OP): Performed by: STUDENT IN AN ORGANIZED HEALTH CARE EDUCATION/TRAINING PROGRAM

## 2021-11-26 PROCEDURE — 36415 COLL VENOUS BLD VENIPUNCTURE: CPT

## 2021-11-26 PROCEDURE — 700102 HCHG RX REV CODE 250 W/ 637 OVERRIDE(OP): Performed by: INTERNAL MEDICINE

## 2021-11-26 PROCEDURE — 86480 TB TEST CELL IMMUN MEASURE: CPT

## 2021-11-26 PROCEDURE — A9270 NON-COVERED ITEM OR SERVICE: HCPCS

## 2021-11-26 PROCEDURE — A9270 NON-COVERED ITEM OR SERVICE: HCPCS | Performed by: INTERNAL MEDICINE

## 2021-11-26 PROCEDURE — 770006 HCHG ROOM/CARE - MED/SURG/GYN SEMI*

## 2021-11-26 RX ORDER — HALOPERIDOL 2 MG/1
2 TABLET ORAL EVERY 6 HOURS PRN
Status: DISCONTINUED | OUTPATIENT
Start: 2021-11-26 | End: 2021-11-30 | Stop reason: HOSPADM

## 2021-11-26 RX ADMIN — DOCUSATE SODIUM 50 MG AND SENNOSIDES 8.6 MG 2 TABLET: 8.6; 5 TABLET, FILM COATED ORAL at 05:12

## 2021-11-26 RX ADMIN — QUETIAPINE FUMARATE 12.5 MG: 25 TABLET ORAL at 14:13

## 2021-11-26 RX ADMIN — APIXABAN 2.5 MG: 5 TABLET, FILM COATED ORAL at 16:44

## 2021-11-26 RX ADMIN — MULTIPLE VITAMINS W/ MINERALS TAB 1 TABLET: TAB at 05:12

## 2021-11-26 RX ADMIN — METOPROLOL TARTRATE 50 MG: 50 TABLET, FILM COATED ORAL at 16:44

## 2021-11-26 RX ADMIN — METOPROLOL TARTRATE 50 MG: 50 TABLET, FILM COATED ORAL at 05:12

## 2021-11-26 RX ADMIN — DOCUSATE SODIUM 50 MG AND SENNOSIDES 8.6 MG 2 TABLET: 8.6; 5 TABLET, FILM COATED ORAL at 16:44

## 2021-11-26 RX ADMIN — APIXABAN 2.5 MG: 5 TABLET, FILM COATED ORAL at 05:12

## 2021-11-26 RX ADMIN — Medication 100 MG: at 05:12

## 2021-11-26 RX ADMIN — HALOPERIDOL 2 MG: 2 TABLET ORAL at 21:36

## 2021-11-26 NOTE — CARE PLAN
The patient is Stable - Low risk of patient condition declining or worsening    Shift Goals  Clinical Goals: Remain free from falls and injuries  Patient Goals: Rest  Family Goals: N/A    Progress made toward(s) clinical / shift goals:  Patient is new to unit. Alert and oriented to self. Impulsive and threatening caregivers, crying at times. Trying to get out from bed. Bed alarm in placed. Tele sitted at bedside. Bladder scan showed 554. Straight cath done wioth another RN, and CNA. Got 1000ml. PRN Seroquel given. Changed gown, skin checked. Abrasion on Right hip and applied foam dressing. Redness, blanching and boggy skin on bilateral heels and elbow, mepilex applied. Bony area at the back, mepilex applied. Redness, blanching on sacrum. Barrier cream applied. Fall prevention and precaution in placed. Kept safe and monitored.     Patient is not progressing towards the following goals:

## 2021-11-26 NOTE — ASSESSMENT & PLAN NOTE
Now heart rate controlled  Continue metoprolol    CHADSVAS 7, stroke risk of 15.7% a year  HASBLED score of 3, risk of bleed is 5% a year.   Spoke to DPOA, spoke about risks of anticuagulation of bleeding as pt has hx of falls. vs thrombosis. W/ pt going to SNF, will have assistence w/ fall risk.   Continue low-dose Eliquis

## 2021-11-26 NOTE — PROGRESS NOTES
Hospital Medicine Daily Progress Note    Date of Service  11/26/2021    Chief Complaint  Viki García is a 86 y.o. female admitted 11/12/2021 with altered mental status.    Hospital Course  86-year-old female with past medical history of dementia at baseline oriented to self who was admitted 11/12/2021 for altered mental status.  Patient was found down by her  unconscious, family noted frequent falls.  In the ER CT scan was negative for acute findings.  Work-up significant for hypernatremia started on IV fluids.  Patient also noted to drink 6 blood lites daily and was started on CIWA call withdrawal.  Patient has been disoriented throughout hospital stay.  Patient went into A. fib RVR started on metoprolol and Eliquis.  Patient's mentation improved back to baseline of orientation to self.  Patient was started on Seroquel for her sundowning and delirium.  While hospitalized patient noted to have UTI and treated with antibiotics.  After discussions with family, decision made to discharge patient to memory care.    Interval Problem Update  Vital signs stable.  Patient downgraded to medical.  Patient noted to have continued urinary retention and was straight cathed overnight, will place Barnes catheter.  Patient is oriented to self, calm on my evaluation and answering some questions mostly with answer I am okay.  QuantiFERON ordered, per case management order Covid test on Sunday.    I have personally seen and examined the patient at bedside. I discussed the plan of care with patient, bedside RN and charge RN.    Consultants/Specialty  N/A    Code Status  DNAR/DNI    Disposition  Patient is medically cleared.   Anticipate discharge to to memory care, plan for dc 11/30/21  I have placed the appropriate orders for post-discharge needs.    Review of Systems  Review of Systems   Unable to perform ROS: Dementia        Physical Exam  Temp:  [36.3 °C (97.4 °F)-36.7 °C (98 °F)] 36.6 °C (97.8 °F)  Pulse:  [65-78]  77  Resp:  [16] 16  BP: (114-158)/(66-99) 114/69  SpO2:  [97 %-98 %] 97 %    Physical Exam  Vitals and nursing note reviewed.   Constitutional:       General: She is not in acute distress.     Comments: Frail appearing   HENT:      Head: Normocephalic and atraumatic.   Eyes:      Conjunctiva/sclera: Conjunctivae normal.   Cardiovascular:      Rate and Rhythm: Normal rate and regular rhythm.      Pulses: Normal pulses.      Heart sounds: Normal heart sounds.   Pulmonary:      Effort: Pulmonary effort is normal. No respiratory distress.      Breath sounds: Normal breath sounds.   Abdominal:      General: Abdomen is flat. There is no distension.      Palpations: Abdomen is soft.      Tenderness: There is no abdominal tenderness.   Musculoskeletal:      Cervical back: Neck supple.      Right lower leg: No edema.      Left lower leg: No edema.   Skin:     General: Skin is warm and dry.   Neurological:      General: No focal deficit present.      Mental Status: She is alert. She is disoriented.      Cranial Nerves: No cranial nerve deficit.   Psychiatric:         Mood and Affect: Mood normal.         Fluids    Intake/Output Summary (Last 24 hours) at 11/26/2021 1111  Last data filed at 11/26/2021 0100  Gross per 24 hour   Intake --   Output 1800 ml   Net -1800 ml       Laboratory  Recent Labs     11/24/21  0149 11/25/21  0102   WBC 12.2* 11.2*   RBC 3.27* 3.31*   HEMOGLOBIN 10.5* 10.4*   HEMATOCRIT 31.7* 32.5*   MCV 96.9 98.2*   MCH 32.1 31.4   MCHC 33.1* 32.0*   RDW 47.2 48.5   PLATELETCT 280 288   MPV 9.9 9.9     Recent Labs     11/24/21  0149 11/25/21  0102   SODIUM 139 137   POTASSIUM 3.9 4.1   CHLORIDE 106 106   CO2 25 25   GLUCOSE 157* 132*   BUN 12 15   CREATININE 0.60 0.64   CALCIUM 8.3* 8.4*                   Imaging  CT-HEAD W/O   Final Result         1.  No acute intracranial abnormality is identified, nonspecific changes, most commonly associated with small vessel ischemic disease. Associated moderate cerebral  atrophy.   2.  Atherosclerosis.         DX-SKULL-LIMITED 3-   Final Result      Postsurgical change in the cervical spine. Otherwise no evidence of metallic foreign body.      DX-PELVIS-1 OR 2 VIEWS   Final Result      No radiopaque foreign body identified.      DX-CHEST-LIMITED (1 VIEW)   Final Result         Enlarged cardiac silhouette without evidence of acute disease.      No pacemaker.      [id number] VIEW   Final Result      No radiopaque foreign body identified.      IR-US GUIDED PIV   Final Result    Ultrasound-guided PERIPHERAL IV INSERTION performed by    qualified nursing staff as above.      EC-ECHOCARDIOGRAM COMPLETE W/O CONT   Final Result      DX-CHEST-PORTABLE (1 VIEW)   Final Result      Limited exam showing no acute cardiopulmonary disease.      CT-HEAD W/O   Final Result      1.  Extensive atrophy and white matter changes.   2.  No acute intracranial hemorrhage or territorial infarct.   3.  Small chronic LEFT frontal infarct.           Assessment/Plan  Atrial fibrillation with RVR (HCC)  Assessment & Plan  Now heart rate controlled  Continue metoprolol    CHADSVAS 7, stroke risk of 15.7% a year  HASBLED score of 3, risk of bleed is 5% a year.   Spoke to DPOA, spoke about risks of anticuagulation of bleeding as pt has hx of falls. vs thrombosis. W/ pt going to SNF, will have assistence w/ fall risk.   Continue low-dose Eliquis    Debility- (present on admission)  Assessment & Plan  Hx of dementia, long term alcohol use.     Per neighbor worsened mental function in last year.   Pending memory care placement    Encephalopathy  Assessment & Plan  Hx of Dementia and per PCP note, longterm alcohol use starting at age 8, possible wernicke worsening dementia. Pt found on ground, No signs of fall/trauma.  Dehydrated on arrival  CT head- no hematoma, signs of vascular dementia  CIWA discontinued  B12, TSH normal   Negative for infection  Daily standing weight   I/O daily     Resolved, at baseline of  oriented to self    Hypovolemia dehydration  Assessment & Plan  In ED, pt AMS, elevated BUN, Hypernatremc(149-150), Dry  given 1L ns IVF resuscitation in ED , not eating, volume down.   No renal dysfunction,  Status post IV fluids  Resolved    Alcohol withdrawal (HCC)  Assessment & Plan    Longstanding hx of alcohol use. Per pcp note pt states drinking since age of 8.   CIWA discontinued  alcohol level negative.  - Continue multivitamin, folate, and high dose IV thiamine supplementation.       Essential (primary) hypertension  Assessment & Plan  Hx of HTN on Amlodipine 10mg   BP has been within range, held medications for now.   Overnight 11/14 pt hypertensive, added PRN labetalol.     Type 2 diabetes mellitus without complication, without long-term current use of insulin (HCC)  Assessment & Plan  Hx of DM2, per PCP note not medicated.   Monitor off ISS       VTE prophylaxis: therapeutic anticoagulation with Apixaban    I have performed a physical exam and reviewed and updated ROS and Plan today (11/26/2021). In review of yesterday's note (11/25/2021), there are no changes except as documented above.

## 2021-11-26 NOTE — CARE PLAN
The patient is Stable - Low risk of patient condition declining or worsening    Shift Goals  Clinical Goals: Pt will remain free from falls.   Patient Goals: Rest  Family Goals: n/a    Progress made toward(s) clinical / shift goals:  All fall precautions in place, tele sitter at bedside. Safety education provided, call light at bedside.      Patient is not progressing towards the following goals:

## 2021-11-26 NOTE — THERAPY
"Speech Language Pathology  Daily Treatment     Patient Name: Viki García  Age:  86 y.o., Sex:  female  Medical Record #: [id number]  Today's Date: 11/26/2021     Precautions  Precautions: Fall Risk,Swallow Precautions ( See Comments)  Comments: confused    Assessment    Pt awakens with verbal and tactile stimulation. Pt moved up bed and positioned to 90 degrees. Food cut up and set up for pt with pt able to feed self approx 50% of the boluses. SLP providing feeding when pt having difficulty managing the utensil to mouth. Pt with slight to no oral residue post swallow. No coughing during meal. Pt eating approx 50% of tray with EC7/TNO. Pt taking one sip of thin juice at time with no coughing post swallow.     Plan  EC7/TNO  Continue current treatment plan.    Discharge Recommendations: Anticipate that the patient will have no further speech therapy needs after discharge from the hospital       11/26/21 2778   Dysphagia    Dysphagia X   Positioning / Behavior Modification Modulate Rate or Bite Size;Multiple Swallows   Diet / Liquid Recommendation Regular - Easy to Chew (7)   Nutritional Liquid Intake Rating Scale Non thickened beverages   Nutritional Food Intake Rating Scale Total oral diet with multiple consistencies but requiring special preparation or compensations   Nursing Communication Swallow Precaution Sign Posted at Head of Bed   Skilled Intervention Compensatory Strategies;Verbal Cueing   Recommended Route of Medication Administration   Medication Administration  Whole with Liquid Wash   Patient / Family Goals   Patient / Family Goal #1 \"Thats all for now thank you\"   Goal #1 Outcome Progressing as expected   Short Term Goals   Short Term Goal # 2 B  11/26 Patient will consume a EC7/TN0 diet with no overt s/sx of aspiration given assistance with feeding as needed.    Goal Outcome  # 2 B Progressing as expected   Short Term Goal # 3 Patient will consume thin liquids with no overt s/sx of aspiration.    Goal " Outcome  # 3 Goal met   Session Information   Priority 3  (3x,ETOH/wernicke'sencepha, EC7/TNO 1-1, assist PRN)

## 2021-11-26 NOTE — PROGRESS NOTES
Patient bladder scan showed 677. This is the second time patient had a urine retention. Bladder scan showed 564 earlier and drain 1000 ml via straight catheter. Called [facility] to ask an order for lamb. Waiting response

## 2021-11-26 NOTE — DISCHARGE PLANNING
Anticipated Discharge Disposition:   [facility]- Memory Unit    Action:   Received call from previous RN CM that paperwork from [facility] still needs to be filled out by MD, plan for discharge to [facility] on Tuesday, 11/30, transportation needs to be set up by [facility]. Paperwork from [facility] to be filled out placed in chart , Dr. Martell aware. Per note, TB test and COVID test needed. No orders at this time. RN CM called [facility] about time frame for COVID test. No answer, left voicemail for call back. DO and beside RN informed via Voalte.     Barriers to Discharge:   Placement acceptance and bed availability, pending requirements.  Transportation set up.    Plan:   Hospital Care Management will continue to follow and assist with discharge planning needs.

## 2021-11-26 NOTE — PROGRESS NOTES
4 Eyes Skin Assessment Completed by GABRIELA Conte and GABRIELA López.    Head 2 big lump on the lower back of the head.  Ears WDL  Nose WDL  Mouth WDL  Neck WDL  Breast/Chest WDL  Shoulder Blades WDL  Spine Redness and Blanching, Bony prominence  (R) Arm/Elbow/Hand Redness and Blanching  (L) Arm/Elbow/Hand Redness and Blanching  Abdomen WDL  Groin Redness and Blanching  Scrotum/Coccyx/Buttocks Redness and Blanching  (R) Leg WDL  (L) Leg WDL  (R) Heel/Foot/Toe Redness, Blanching and Scab  (L) Heel/Foot/Toe DTI    Right hip- Redness, blanching abrasion- foam dressing applied    Devices In Places Blood Pressure Cuff      Interventions In Place Heel Mepilex, Pillows, Elbow Mepilex, Optifoam, Q2 Turns, Barrier Cream and Pressure Redistribution Mattress    Possible Skin Injury No    Pictures Uploaded Into Epic Yes  Wound Consult Placed N/A  RN Wound Prevention Protocol Ordered No

## 2021-11-26 NOTE — PROGRESS NOTES
Second attempt tried to call the [facility], waiting response.     0020 am - Bladder scan of 564  Output-1000 ml via straight cath    0539 am- Bladder scan of 677

## 2021-11-26 NOTE — PROGRESS NOTES
"Pt. Assisted in removing her mask, she then said that she has hearing aids on but this was not noted on her ears. Pt. Stated \" Oh my God,did you lose it!\" called [facility] on T8 to see if pt. Left any belongings at bedside. Per Lee he did not find any. Only belongings pt. Came in with is a bucket of medical supplies like flushes and no personal belongings. meds were placed in the bin.   "

## 2021-11-26 NOTE — PROGRESS NOTES
Patient is new to unit. Alert and oriented to self. Impulsive and threatening caregivers, crying at times. Trying to get out from bed. Bed alarm in placed. Tele sitted at bedside. Bladder scan showed 554. Straight cath done wioth another RN, and CNA. Got 1000ml. PRN Seroquel given. Changed gown, skin checked. Abrasion on Right hip and applied foam dressing. Redness, blanching and boggy skin on bilateral heels and elbow, mepilex applied. Bony area at the back, mepilex applied. Redness, blanching on sacrum. Barrier cream applied. Fall prevention and precaution in placed. Kept safe and monitored.

## 2021-11-27 PROCEDURE — 700102 HCHG RX REV CODE 250 W/ 637 OVERRIDE(OP): Performed by: STUDENT IN AN ORGANIZED HEALTH CARE EDUCATION/TRAINING PROGRAM

## 2021-11-27 PROCEDURE — A9270 NON-COVERED ITEM OR SERVICE: HCPCS

## 2021-11-27 PROCEDURE — 700102 HCHG RX REV CODE 250 W/ 637 OVERRIDE(OP)

## 2021-11-27 PROCEDURE — 700102 HCHG RX REV CODE 250 W/ 637 OVERRIDE(OP): Performed by: INTERNAL MEDICINE

## 2021-11-27 PROCEDURE — 99231 SBSQ HOSP IP/OBS SF/LOW 25: CPT | Performed by: INTERNAL MEDICINE

## 2021-11-27 PROCEDURE — A9270 NON-COVERED ITEM OR SERVICE: HCPCS | Performed by: STUDENT IN AN ORGANIZED HEALTH CARE EDUCATION/TRAINING PROGRAM

## 2021-11-27 PROCEDURE — 770006 HCHG ROOM/CARE - MED/SURG/GYN SEMI*

## 2021-11-27 PROCEDURE — 700111 HCHG RX REV CODE 636 W/ 250 OVERRIDE (IP): Performed by: NURSE PRACTITIONER

## 2021-11-27 PROCEDURE — A9270 NON-COVERED ITEM OR SERVICE: HCPCS | Performed by: INTERNAL MEDICINE

## 2021-11-27 RX ORDER — CHOLECALCIFEROL (VITAMIN D3) 125 MCG
5 CAPSULE ORAL PRN
Status: DISCONTINUED | OUTPATIENT
Start: 2021-11-27 | End: 2021-11-30 | Stop reason: HOSPADM

## 2021-11-27 RX ORDER — HALOPERIDOL 5 MG/ML
2 INJECTION INTRAMUSCULAR ONCE
Status: COMPLETED | OUTPATIENT
Start: 2021-11-27 | End: 2021-11-27

## 2021-11-27 RX ORDER — CHOLECALCIFEROL (VITAMIN D3) 125 MCG
5 CAPSULE ORAL NIGHTLY
Status: DISCONTINUED | OUTPATIENT
Start: 2021-11-28 | End: 2021-11-30 | Stop reason: HOSPADM

## 2021-11-27 RX ADMIN — MULTIPLE VITAMINS W/ MINERALS TAB 1 TABLET: TAB at 04:40

## 2021-11-27 RX ADMIN — METOPROLOL TARTRATE 50 MG: 50 TABLET, FILM COATED ORAL at 17:24

## 2021-11-27 RX ADMIN — ERGOCALCIFEROL 50000 UNITS: 1.25 CAPSULE ORAL at 12:36

## 2021-11-27 RX ADMIN — APIXABAN 2.5 MG: 5 TABLET, FILM COATED ORAL at 04:41

## 2021-11-27 RX ADMIN — HALOPERIDOL 2 MG: 2 TABLET ORAL at 12:36

## 2021-11-27 RX ADMIN — METOPROLOL TARTRATE 50 MG: 50 TABLET, FILM COATED ORAL at 04:41

## 2021-11-27 RX ADMIN — HALOPERIDOL 2 MG: 2 TABLET ORAL at 20:48

## 2021-11-27 RX ADMIN — HALOPERIDOL LACTATE 2 MG: 5 INJECTION, SOLUTION INTRAMUSCULAR at 22:44

## 2021-11-27 RX ADMIN — APIXABAN 2.5 MG: 5 TABLET, FILM COATED ORAL at 17:25

## 2021-11-27 RX ADMIN — Medication 100 MG: at 04:41

## 2021-11-27 NOTE — PROGRESS NOTES
Hospital Medicine Daily Progress Note    Date of Service  11/27/2021    Chief Complaint  Viki García is a 86 y.o. female admitted 11/12/2021 with altered mental status.    Hospital Course  86-year-old female with past medical history of dementia at baseline oriented to self who was admitted 11/12/2021 for altered mental status.  Patient was found down by her  unconscious, family noted frequent falls.  In the ER CT scan was negative for acute findings.  Work-up significant for hypernatremia started on IV fluids.  Patient also noted to drink 6 blood lites daily and was started on CIWA call withdrawal.  Patient has been disoriented throughout hospital stay.  Patient went into A. fib RVR started on metoprolol and Eliquis.  Patient's mentation improved back to baseline of orientation to self.  Patient was started on Seroquel for her sundowning and delirium.  While hospitalized patient noted to have UTI and treated with antibiotics. Patient also had urinary retention and lamb was placed a second time. Patient will need to discharge with lamb and follow up with urology outpatient.  After discussions with family, decision made to discharge patient to memory care.    Interval Problem Update  No acute events overnight. Per nursing patient agitated overnight requiring dose of haldol. Patients only complaint is she wants to leave.     I have personally seen and examined the patient at bedside. I discussed the plan of care with patient, bedside RN and charge RN.    Consultants/Specialty  N/A    Code Status  DNAR/DNI    Disposition  Patient is medically cleared.   Anticipate discharge to to memory care, plan for dc 11/30/21  I have placed the appropriate orders for post-discharge needs.    Review of Systems  Review of Systems   Unable to perform ROS: Dementia        Physical Exam  Temp:  [36.6 °C (97.9 °F)-37.5 °C (99.5 °F)] 36.6 °C (97.9 °F)  Pulse:  [68-77] 68  Resp:  [18] 18  BP: (117-140)/(50-72) 117/72  SpO2:   [92 %-98 %] 98 %    Physical Exam  Vitals and nursing note reviewed.   Constitutional:       General: She is not in acute distress.     Comments: Frail appearing   HENT:      Head: Normocephalic and atraumatic.   Eyes:      Conjunctiva/sclera: Conjunctivae normal.   Cardiovascular:      Rate and Rhythm: Normal rate and regular rhythm.      Pulses: Normal pulses.      Heart sounds: Normal heart sounds.   Pulmonary:      Effort: Pulmonary effort is normal. No respiratory distress.      Breath sounds: Normal breath sounds.   Abdominal:      General: Abdomen is flat. There is no distension.      Palpations: Abdomen is soft.      Tenderness: There is no abdominal tenderness.   Musculoskeletal:      Cervical back: Neck supple.      Right lower leg: No edema.      Left lower leg: No edema.   Skin:     General: Skin is warm and dry.   Neurological:      General: No focal deficit present.      Mental Status: She is alert. She is disoriented.      Cranial Nerves: No cranial nerve deficit.   Psychiatric:         Mood and Affect: Mood is depressed.         Fluids    Intake/Output Summary (Last 24 hours) at 11/27/2021 0955  Last data filed at 11/27/2021 0910  Gross per 24 hour   Intake 458 ml   Output 1300 ml   Net -842 ml       Laboratory  Recent Labs     11/25/21  0102   WBC 11.2*   RBC 3.31*   HEMOGLOBIN 10.4*   HEMATOCRIT 32.5*   MCV 98.2*   MCH 31.4   MCHC 32.0*   RDW 48.5   PLATELETCT 288   MPV 9.9     Recent Labs     11/25/21  0102   SODIUM 137   POTASSIUM 4.1   CHLORIDE 106   CO2 25   GLUCOSE 132*   BUN 15   CREATININE 0.64   CALCIUM 8.4*                   Imaging  CT-HEAD W/O   Final Result         1.  No acute intracranial abnormality is identified, nonspecific changes, most commonly associated with small vessel ischemic disease. Associated moderate cerebral atrophy.   2.  Atherosclerosis.         DX-SKULL-LIMITED 3-   Final Result      Postsurgical change in the cervical spine. Otherwise no evidence of metallic  foreign body.      DX-PELVIS-1 OR 2 VIEWS   Final Result      No radiopaque foreign body identified.      DX-CHEST-LIMITED (1 VIEW)   Final Result         Enlarged cardiac silhouette without evidence of acute disease.      No pacemaker.      TS-[id number]-4 VIEW   Final Result      No radiopaque foreign body identified.      IR-US GUIDED PIV   Final Result    Ultrasound-guided PERIPHERAL IV INSERTION performed by    qualified nursing staff as above.      EC-ECHOCARDIOGRAM COMPLETE W/O CONT   Final Result      DX-CHEST-PORTABLE (1 VIEW)   Final Result      Limited exam showing no acute cardiopulmonary disease.      CT-HEAD W/O   Final Result      1.  Extensive atrophy and white matter changes.   2.  No acute intracranial hemorrhage or territorial infarct.   3.  Small chronic LEFT frontal infarct.           Assessment/Plan  Atrial fibrillation with RVR (HCC)  Assessment & Plan  Now heart rate controlled  Continue metoprolol    CHADSVAS 7, stroke risk of 15.7% a year  HASBLED score of 3, risk of bleed is 5% a year.   Spoke to DPOA, spoke about risks of anticuagulation of bleeding as pt has hx of falls. vs thrombosis. W/ pt going to SNF, will have assistence w/ fall risk.   Continue low-dose Eliquis    Debility- (present on admission)  Assessment & Plan  Hx of dementia, long term alcohol use.     Per neighbor worsened mental function in last year.   Pending memory care placement    Encephalopathy  Assessment & Plan  Hx of Dementia and per PCP note, longterm alcohol use starting at age 8, possible wernicke worsening dementia. Pt found on ground, No signs of fall/trauma.  Dehydrated on arrival  CT head- no hematoma, signs of vascular dementia  CIWA discontinued  B12, TSH normal   Negative for infection  Daily standing weight   I/O daily     Resolved, at baseline of oriented to self    Hypovolemia dehydration  Assessment & Plan  In ED, pt AMS, elevated BUN, Hypernatremc(149-150), Dry  given 1L ns IVF resuscitation in ED , not  eating, volume down.   No renal dysfunction,  Status post IV fluids  Resolved    Alcohol withdrawal (HCC)  Assessment & Plan    Longstanding hx of alcohol use. Per pcp note pt states drinking since age of 8.   CIWA discontinued  alcohol level negative.  - Continue multivitamin, folate, and high dose IV thiamine supplementation.       Essential (primary) hypertension  Assessment & Plan  Hx of HTN on Amlodipine 10mg   BP has been within range, held medications for now.   Overnight 11/14 pt hypertensive, added PRN labetalol.     Type 2 diabetes mellitus without complication, without long-term current use of insulin (HCC)  Assessment & Plan  Hx of DM2, per PCP note not medicated.   Monitor off ISS       VTE prophylaxis: therapeutic anticoagulation with Apixaban    I have performed a physical exam and reviewed and updated ROS and Plan today (11/27/2021). In review of yesterday's note (11/26/2021), there are no changes except as documented above.

## 2021-11-27 NOTE — DISCHARGE PLANNING
Anticipated Discharge Disposition: [facility], Memory Unit    Action:  on 11/26/21, Octavio Nixon has been working on case, plan is for patient to discharge to [facility] on 11/30/21, transportation to be set up by [facility]. Paperwork is in chart, needs to be completed prior to discharge, Dr. Martell aware. TB, quantiferon has been ordered, pending results, could take a few days. Covid PCR test still needs to be ordered, will ask on Sunday for Covid test, takes 24 hours for results. This RN,  called [facility], spoke with Lizzy in admissions. Patient has been accepted, they will need all 6 pages of paperwork completed prior to discharge, doctor needs to sign the paperwork, patient must be on oral medications, negative TB and Covid results faxed to facility.     Barriers to Discharge: [facility] will have a bed on 11/30/21, negative TB, and Covid results, (Covid test needs to be ordered on Sunday), admission application must be completed prior to discharge, in patient's hard chart.     Plan:  please ask Provider to order Covid test on Sunday, 11/28/21, make sure Provider signs and completes paperwork in hard chart, set up [facility] for Tuesday 11/30/21 at 10:00am.     Lakshmi Delgadillo RN,

## 2021-11-27 NOTE — CARE PLAN
The patient is Stable - Low risk of patient condition declining or worsening    Shift Goals  Clinical Goals: Manage anxiety/agitation  Patient Goals:  Family Goals:     Progress made toward(s) clinical / shift goals:  Pt agitated, frequently tries to get out of bed, yelling. Distraction and prn Haldol used for agitation mgt.     Patient is not progressing towards the following goals:

## 2021-11-27 NOTE — CARE PLAN
The patient is Stable - Low risk of patient condition declining or worsening    Shift Goals  Clinical Goals: Remain free from falls  Patient Goals: Rest  Family Goals: n/a    Progress made toward(s) clinical / shift goals:  Bed alarm on, bed locked and in lowest position, personal belongings within reach, call light with in reach, telesitter in place, pt re-oriented to location and situation, pt educated on the use of the call light.     Patient is not progressing towards the following goals:

## 2021-11-27 NOTE — PROGRESS NOTES
COVID-19 surge in effect.    Assumed care of pt at shift change. Pt is on RA with no signs of acute distress. A&Ox1. Denies any pain. Pt is impulsive but has but has Tele sitter in place. All safety precautions in place.  Bed locked and in lowest position. Hourly rounding in place.

## 2021-11-27 NOTE — PROGRESS NOTES
COVID-19 surge in effect    Received report from GABRIELA Fong. Pt is A&O x1 and confused, disoriented to place, time, and situation. Pt on RA, no signs of acute distress. Pt complains of 0/10. Pt was becoming anxious and agitated, Haldol was administered per MAR. Telesitter in place. POC discussed with patient.  Safety precautions in place, bed in lowest position, and call light and personal belongings within reach. Hourly rounding in place.

## 2021-11-28 LAB
ANION GAP SERPL CALC-SCNC: 9 MMOL/L (ref 7–16)
BUN SERPL-MCNC: 15 MG/DL (ref 8–22)
CALCIUM SERPL-MCNC: 9.1 MG/DL (ref 8.5–10.5)
CHLORIDE SERPL-SCNC: 104 MMOL/L (ref 96–112)
CO2 SERPL-SCNC: 23 MMOL/L (ref 20–33)
CREAT SERPL-MCNC: 0.42 MG/DL (ref 0.5–1.4)
ERYTHROCYTE [DISTWIDTH] IN BLOOD BY AUTOMATED COUNT: 46.8 FL (ref 35.9–50)
GLUCOSE SERPL-MCNC: 108 MG/DL (ref 65–99)
HCT VFR BLD AUTO: 33.4 % (ref 37–47)
HGB BLD-MCNC: 11.3 G/DL (ref 12–16)
MCH RBC QN AUTO: 32.3 PG (ref 27–33)
MCHC RBC AUTO-ENTMCNC: 33.8 G/DL (ref 33.6–35)
MCV RBC AUTO: 95.4 FL (ref 81.4–97.8)
PLATELET # BLD AUTO: 339 K/UL (ref 164–446)
PMV BLD AUTO: 9.9 FL (ref 9–12.9)
POTASSIUM SERPL-SCNC: 4.3 MMOL/L (ref 3.6–5.5)
RBC # BLD AUTO: 3.5 M/UL (ref 4.2–5.4)
SODIUM SERPL-SCNC: 136 MMOL/L (ref 135–145)
WBC # BLD AUTO: 12.4 K/UL (ref 4.8–10.8)

## 2021-11-28 PROCEDURE — 700102 HCHG RX REV CODE 250 W/ 637 OVERRIDE(OP): Performed by: STUDENT IN AN ORGANIZED HEALTH CARE EDUCATION/TRAINING PROGRAM

## 2021-11-28 PROCEDURE — 36415 COLL VENOUS BLD VENIPUNCTURE: CPT

## 2021-11-28 PROCEDURE — U0005 INFEC AGEN DETEC AMPLI PROBE: HCPCS

## 2021-11-28 PROCEDURE — A9270 NON-COVERED ITEM OR SERVICE: HCPCS | Performed by: INTERNAL MEDICINE

## 2021-11-28 PROCEDURE — 700102 HCHG RX REV CODE 250 W/ 637 OVERRIDE(OP): Performed by: NURSE PRACTITIONER

## 2021-11-28 PROCEDURE — 85027 COMPLETE CBC AUTOMATED: CPT

## 2021-11-28 PROCEDURE — 770006 HCHG ROOM/CARE - MED/SURG/GYN SEMI*

## 2021-11-28 PROCEDURE — 80048 BASIC METABOLIC PNL TOTAL CA: CPT

## 2021-11-28 PROCEDURE — 700102 HCHG RX REV CODE 250 W/ 637 OVERRIDE(OP)

## 2021-11-28 PROCEDURE — A9270 NON-COVERED ITEM OR SERVICE: HCPCS | Performed by: NURSE PRACTITIONER

## 2021-11-28 PROCEDURE — 99231 SBSQ HOSP IP/OBS SF/LOW 25: CPT | Performed by: INTERNAL MEDICINE

## 2021-11-28 PROCEDURE — A9270 NON-COVERED ITEM OR SERVICE: HCPCS | Performed by: STUDENT IN AN ORGANIZED HEALTH CARE EDUCATION/TRAINING PROGRAM

## 2021-11-28 PROCEDURE — 700102 HCHG RX REV CODE 250 W/ 637 OVERRIDE(OP): Performed by: INTERNAL MEDICINE

## 2021-11-28 PROCEDURE — A9270 NON-COVERED ITEM OR SERVICE: HCPCS

## 2021-11-28 PROCEDURE — U0003 INFECTIOUS AGENT DETECTION BY NUCLEIC ACID (DNA OR RNA); SEVERE ACUTE RESPIRATORY SYNDROME CORONAVIRUS 2 (SARS-COV-2) (CORONAVIRUS DISEASE [COVID-19]), AMPLIFIED PROBE TECHNIQUE, MAKING USE OF HIGH THROUGHPUT TECHNOLOGIES AS DESCRIBED BY CMS-2020-01-R: HCPCS

## 2021-11-28 RX ORDER — QUETIAPINE FUMARATE 25 MG/1
25 TABLET, FILM COATED ORAL NIGHTLY
Status: DISCONTINUED | OUTPATIENT
Start: 2021-11-28 | End: 2021-11-30 | Stop reason: HOSPADM

## 2021-11-28 RX ADMIN — METOPROLOL TARTRATE 50 MG: 50 TABLET, FILM COATED ORAL at 16:14

## 2021-11-28 RX ADMIN — APIXABAN 2.5 MG: 5 TABLET, FILM COATED ORAL at 16:14

## 2021-11-28 RX ADMIN — APIXABAN 2.5 MG: 5 TABLET, FILM COATED ORAL at 06:07

## 2021-11-28 RX ADMIN — METOPROLOL TARTRATE 50 MG: 50 TABLET, FILM COATED ORAL at 06:07

## 2021-11-28 RX ADMIN — Medication 5 MG: at 19:33

## 2021-11-28 RX ADMIN — MULTIPLE VITAMINS W/ MINERALS TAB 1 TABLET: TAB at 06:07

## 2021-11-28 RX ADMIN — Medication 5 MG: at 14:13

## 2021-11-28 RX ADMIN — Medication 100 MG: at 06:06

## 2021-11-28 RX ADMIN — QUETIAPINE FUMARATE 25 MG: 25 TABLET ORAL at 17:28

## 2021-11-28 RX ADMIN — Medication 5 MG: at 00:19

## 2021-11-28 NOTE — PROGRESS NOTES
COVID-19 surge in effect.     Assumed care of pt at shift change. Pt is on RA with no signs of acute distress. A&Ox1. Denies any pain. Pt is impulsive, tele sitter in place. All safety precautions in place.  Bed locked and in lowest position. Hourly rounding in place

## 2021-11-28 NOTE — PROGRESS NOTES
COVID-19 surge in effect     Received report from GABRIELA Fong. Pt is A&O x1, disoriented to place, time, and situation. Pt on RA, no signs of acute distress. Pt complains of 0/10. Pt was becoming anxious and agitated, Haldol was administered per MAR. Telesitter in place. POC discussed with patient. Safety precautions in place, bed in lowest position, and call light and personal belongings within reach. Hourly rounding in place.

## 2021-11-28 NOTE — PROGRESS NOTES
Hospital Medicine Daily Progress Note    Date of Service  11/28/2021    Chief Complaint  Viki García is a 86 y.o. female admitted 11/12/2021 with altered mental status.    Hospital Course  86-year-old female with past medical history of dementia at baseline oriented to self who was admitted 11/12/2021 for altered mental status.  Patient was found down by her  unconscious, family noted frequent falls.  In the ER CT scan was negative for acute findings.  Work-up significant for hypernatremia started on IV fluids.  Patient also noted to drink 6 blood lites daily and was started on CIWA call withdrawal.  Patient has been disoriented throughout hospital stay.  Patient went into A. fib RVR started on metoprolol and Eliquis.  Patient's mentation improved back to baseline of orientation to self.  Patient was started on Seroquel for her sundowning and delirium.  While hospitalized patient noted to have UTI and treated with antibiotics. Patient also had urinary retention and lamb was placed a second time. Patient will need to discharge with lamb and follow up with urology outpatient.  After discussions with family, decision made to discharge patient to memory care.    Interval Problem Update  Again required haldol overnight for agitation. Vitals stable. Ordered covid test. Quantiferon still pending. Plan for dc on Tuesday. No changes, patient still oriented to self only.     I have personally seen and examined the patient at bedside. I discussed the plan of care with patient, bedside RN and charge RN.    Consultants/Specialty  N/A    Code Status  DNAR/DNI    Disposition  Patient is medically cleared.   Anticipate discharge to to memory care, plan for dc 11/30/21  I have placed the appropriate orders for post-discharge needs.    Review of Systems  Review of Systems   Unable to perform ROS: Dementia        Physical Exam  Temp:  [36.4 °C (97.5 °F)-36.8 °C (98.2 °F)] 36.7 °C (98.1 °F)  Pulse:  [70-77] 77  Resp:   [20] 20  BP: (128-143)/(53-72) 134/69  SpO2:  [96 %-97 %] 97 %    Physical Exam  Vitals and nursing note reviewed.   Constitutional:       General: She is not in acute distress.     Comments: Frail appearing   HENT:      Head: Normocephalic and atraumatic.   Eyes:      Conjunctiva/sclera: Conjunctivae normal.   Cardiovascular:      Rate and Rhythm: Normal rate and regular rhythm.   Pulmonary:      Effort: Pulmonary effort is normal. No respiratory distress.   Abdominal:      General: Abdomen is flat. There is no distension.      Palpations: Abdomen is soft.      Tenderness: There is no abdominal tenderness.   Musculoskeletal:      Right lower leg: No edema.      Left lower leg: No edema.   Skin:     General: Skin is warm and dry.   Neurological:      General: No focal deficit present.      Mental Status: She is alert. She is disoriented.      Cranial Nerves: No cranial nerve deficit.      Comments: Oriented to self    Psychiatric:         Mood and Affect: Mood is depressed.         Fluids    Intake/Output Summary (Last 24 hours) at 11/28/2021 1122  Last data filed at 11/27/2021 1724  Gross per 24 hour   Intake 236 ml   Output --   Net 236 ml       Laboratory  Recent Labs     11/28/21  0513   WBC 12.4*   RBC 3.50*   HEMOGLOBIN 11.3*   HEMATOCRIT 33.4*   MCV 95.4   MCH 32.3   MCHC 33.8   RDW 46.8   PLATELETCT 339   MPV 9.9     Recent Labs     11/28/21  0513   SODIUM 136   POTASSIUM 4.3   CHLORIDE 104   CO2 23   GLUCOSE 108*   BUN 15   CREATININE 0.42*   CALCIUM 9.1                   Imaging  CT-HEAD W/O   Final Result         1.  No acute intracranial abnormality is identified, nonspecific changes, most commonly associated with small vessel ischemic disease. Associated moderate cerebral atrophy.   2.  Atherosclerosis.         DX-SKULL-LIMITED 3-   Final Result      Postsurgical change in the cervical spine. Otherwise no evidence of metallic foreign body.      DX-PELVIS-1 OR 2 VIEWS   Final Result      No radiopaque  foreign body identified.      DX-CHEST-LIMITED (1 VIEW)   Final Result         Enlarged cardiac silhouette without evidence of acute disease.      No pacemaker.      AS-[id number]-9 VIEW   Final Result      No radiopaque foreign body identified.      IR-US GUIDED PIV   Final Result    Ultrasound-guided PERIPHERAL IV INSERTION performed by    qualified nursing staff as above.      EC-ECHOCARDIOGRAM COMPLETE W/O CONT   Final Result      DX-CHEST-PORTABLE (1 VIEW)   Final Result      Limited exam showing no acute cardiopulmonary disease.      CT-HEAD W/O   Final Result      1.  Extensive atrophy and white matter changes.   2.  No acute intracranial hemorrhage or territorial infarct.   3.  Small chronic LEFT frontal infarct.           Assessment/Plan  Atrial fibrillation with RVR (HCC)  Assessment & Plan  Now heart rate controlled  Continue metoprolol    CHADSVAS 7, stroke risk of 15.7% a year  HASBLED score of 3, risk of bleed is 5% a year.   Spoke to DPOA, spoke about risks of anticuagulation of bleeding as pt has hx of falls. vs thrombosis. W/ pt going to SNF, will have assistence w/ fall risk.   Continue low-dose Eliquis    Debility- (present on admission)  Assessment & Plan  Hx of dementia, long term alcohol use.     Per neighbor worsened mental function in last year.   Pending memory care placement    Encephalopathy  Assessment & Plan  Hx of Dementia and per PCP note, longterm alcohol use starting at age 8, possible wernicke worsening dementia. Pt found on ground, No signs of fall/trauma.  Dehydrated on arrival  CT head- no hematoma, signs of vascular dementia  CIWA discontinued  B12, TSH normal   Negative for infection  Daily standing weight   I/O daily     Resolved, at baseline of oriented to self    Hypovolemia dehydration  Assessment & Plan  In ED, pt AMS, elevated BUN, Hypernatremc(149-150), Dry  given 1L ns IVF resuscitation in ED , not eating, volume down.   No renal dysfunction,  Status post IV  fluids  Resolved    Alcohol withdrawal (HCC)  Assessment & Plan    Longstanding hx of alcohol use. Per pcp note pt states drinking since age of 8.   CIWA discontinued  alcohol level negative.  - Continue multivitamin, folate, and high dose IV thiamine supplementation.       Essential (primary) hypertension  Assessment & Plan  Hx of HTN on Amlodipine 10mg   BP has been within range, held medications for now.   Overnight 11/14 pt hypertensive, added PRN labetalol.     Type 2 diabetes mellitus without complication, without long-term current use of insulin (HCC)  Assessment & Plan  Hx of DM2, per PCP note not medicated.   Monitor off ISS       VTE prophylaxis: therapeutic anticoagulation with Apixaban    I have performed a physical exam and reviewed and updated ROS and Plan today (11/28/2021). In review of yesterday's note (11/27/2021), there are no changes except as documented above.

## 2021-11-28 NOTE — CARE PLAN
"The patient is Stable - Low risk of patient condition declining or worsening    Shift Goals  Clinical Goals: Sleep  Patient Goals: Rest  Family Goals: n/a    Progress made toward(s) clinical / shift goals:  Pt encouraged rest. Melatonin was administer per MAR.    Patient is not progressing towards the following goals: Pt is confused and agitated. Pt frequently reports, \"I don't know what is going on.\" This nurse explains why she is in the hospital multiple times. Pt continues to ask the same question.       Problem: Knowledge Deficit - Standard  Goal: Patient and family/care givers will demonstrate understanding of plan of care, disease process/condition, diagnostic tests and medications  Outcome: Not Progressing     "

## 2021-11-28 NOTE — CARE PLAN
The patient is Stable - Low risk of patient condition declining or worsening    Shift Goals  Clinical Goals: Pt will remain free from falls  Patient Goals: Rest  Family Goals: n/a    Progress made toward(s) clinical / shift goals:  All fall risk precautions in place. Tele sitter at bedside.     Patient is not progressing towards the following goals:

## 2021-11-29 LAB
GAMMA INTERFERON BACKGROUND BLD IA-ACNC: 0.06 IU/ML
M TB IFN-G BLD-IMP: NEGATIVE
M TB IFN-G CD4+ BCKGRND COR BLD-ACNC: 0 IU/ML
MITOGEN IGNF BCKGRD COR BLD-ACNC: 0.66 IU/ML
QFT TB2 - NIL TBQ2: -0.01 IU/ML
SARS-COV-2 RNA RESP QL NAA+PROBE: NOTDETECTED
SPECIMEN SOURCE: NORMAL

## 2021-11-29 PROCEDURE — 92526 ORAL FUNCTION THERAPY: CPT

## 2021-11-29 PROCEDURE — 700102 HCHG RX REV CODE 250 W/ 637 OVERRIDE(OP): Performed by: INTERNAL MEDICINE

## 2021-11-29 PROCEDURE — A9270 NON-COVERED ITEM OR SERVICE: HCPCS | Performed by: NURSE PRACTITIONER

## 2021-11-29 PROCEDURE — 99232 SBSQ HOSP IP/OBS MODERATE 35: CPT | Performed by: INTERNAL MEDICINE

## 2021-11-29 PROCEDURE — A9270 NON-COVERED ITEM OR SERVICE: HCPCS | Performed by: STUDENT IN AN ORGANIZED HEALTH CARE EDUCATION/TRAINING PROGRAM

## 2021-11-29 PROCEDURE — A9270 NON-COVERED ITEM OR SERVICE: HCPCS | Performed by: INTERNAL MEDICINE

## 2021-11-29 PROCEDURE — 700102 HCHG RX REV CODE 250 W/ 637 OVERRIDE(OP)

## 2021-11-29 PROCEDURE — 770006 HCHG ROOM/CARE - MED/SURG/GYN SEMI*

## 2021-11-29 PROCEDURE — 700102 HCHG RX REV CODE 250 W/ 637 OVERRIDE(OP): Performed by: STUDENT IN AN ORGANIZED HEALTH CARE EDUCATION/TRAINING PROGRAM

## 2021-11-29 PROCEDURE — 700102 HCHG RX REV CODE 250 W/ 637 OVERRIDE(OP): Performed by: NURSE PRACTITIONER

## 2021-11-29 PROCEDURE — A9270 NON-COVERED ITEM OR SERVICE: HCPCS

## 2021-11-29 RX ADMIN — HALOPERIDOL 2 MG: 2 TABLET ORAL at 16:42

## 2021-11-29 RX ADMIN — QUETIAPINE FUMARATE 25 MG: 25 TABLET ORAL at 16:42

## 2021-11-29 RX ADMIN — APIXABAN 2.5 MG: 5 TABLET, FILM COATED ORAL at 16:42

## 2021-11-29 RX ADMIN — DOCUSATE SODIUM 50 MG AND SENNOSIDES 8.6 MG 2 TABLET: 8.6; 5 TABLET, FILM COATED ORAL at 16:41

## 2021-11-29 RX ADMIN — Medication 100 MG: at 05:46

## 2021-11-29 RX ADMIN — METOPROLOL TARTRATE 50 MG: 50 TABLET, FILM COATED ORAL at 16:42

## 2021-11-29 RX ADMIN — Medication 5 MG: at 21:58

## 2021-11-29 RX ADMIN — APIXABAN 2.5 MG: 5 TABLET, FILM COATED ORAL at 05:47

## 2021-11-29 RX ADMIN — MULTIPLE VITAMINS W/ MINERALS TAB 1 TABLET: TAB at 05:46

## 2021-11-29 ASSESSMENT — ENCOUNTER SYMPTOMS
CHILLS: 0
FEVER: 0
SHORTNESS OF BREATH: 0

## 2021-11-29 NOTE — CARE PLAN
The patient is Stable - Low risk of patient condition declining or worsening    Shift Goals  Clinical Goals: Remain free from falls  Patient Goals: sleep  Family Goals: n/a    Progress made toward(s) clinical / shift goals:  Bed locked and in lowest position, bed alarm on, personal belongings with in reach, call light with in reach, telesitter present, and pt educated on the use of the call light    Patient is not progressing towards the following goals:

## 2021-11-29 NOTE — PROGRESS NOTES
Hospital Medicine Daily Progress Note    Date of Service  11/29/2021    Chief Complaint  Viki García is a 86 y.o. female admitted 11/12/2021 with altered mental status.    Hospital Course  86-year-old female with past medical history of dementia at baseline oriented to self who was admitted 11/12/2021 for altered mental status.  Patient was found down by her  unconscious, family noted frequent falls.  In the ER CT scan was negative for acute findings.  Work-up significant for hypernatremia started on IV fluids.  Patient also noted to drink 6 blood lites daily and was started on CIWA call withdrawal.  Patient has been disoriented throughout hospital stay.  Patient went into A. fib RVR started on metoprolol and Eliquis.  Patient's mentation improved back to baseline of orientation to self.  Patient was started on Seroquel for her sundowning and delirium.  While hospitalized patient noted to have UTI and treated with antibiotics. Patient also had urinary retention and lamb was placed a second time. Patient will need to discharge with lamb and follow up with urology outpatient.  After discussions with family, decision made to discharge patient to memory care.    Interval Problem Update  Started low dose seroquel last night to help with agitation from sundowning. No changes. Plan for discharge to memory care tomorrow morning, paperwork filled out. Quant negative. Covid pending. Patient has no complaints, A&Ox1 at baseline    I have personally seen and examined the patient at bedside. I discussed the plan of care with patient, bedside RN and charge RN.    Consultants/Specialty  N/A    Code Status  DNAR/DNI    Disposition  Patient is medically cleared.   Anticipate discharge to to memory care, plan for dc 11/30/21  I have placed the appropriate orders for post-discharge needs.    Review of Systems  Review of Systems   Unable to perform ROS: Dementia   Constitutional: Negative for chills and fever.    Respiratory: Negative for shortness of breath.    Cardiovascular: Negative for chest pain.        Physical Exam  Temp:  [36.4 °C (97.5 °F)-36.9 °C (98.5 °F)] 36.4 °C (97.5 °F)  Pulse:  [70-89] 71  Resp:  [17-18] 18  BP: (101-135)/(54-63) 135/60  SpO2:  [94 %-95 %] 94 %    Physical Exam  Vitals and nursing note reviewed.   Constitutional:       General: She is not in acute distress.     Comments: Frail appearing  Resting comfortably    HENT:      Head: Normocephalic and atraumatic.   Eyes:      Conjunctiva/sclera: Conjunctivae normal.   Cardiovascular:      Rate and Rhythm: Normal rate and regular rhythm.   Pulmonary:      Effort: Pulmonary effort is normal. No respiratory distress.   Abdominal:      General: Abdomen is flat. There is no distension.      Palpations: Abdomen is soft.      Tenderness: There is no abdominal tenderness.   Musculoskeletal:      Right lower leg: No edema.      Left lower leg: No edema.   Skin:     General: Skin is warm and dry.   Neurological:      General: No focal deficit present.      Mental Status: She is alert. She is disoriented.      Cranial Nerves: No cranial nerve deficit.      Comments: Oriented to self   Occasionally speaks in [place]ian    Psychiatric:         Mood and Affect: Mood is depressed.         Fluids    Intake/Output Summary (Last 24 hours) at 11/29/2021 1247  Last data filed at 11/29/2021 1000  Gross per 24 hour   Intake 360 ml   Output 2125 ml   Net -1765 ml       Laboratory  Recent Labs     11/28/21  0513   WBC 12.4*   RBC 3.50*   HEMOGLOBIN 11.3*   HEMATOCRIT 33.4*   MCV 95.4   MCH 32.3   MCHC 33.8   RDW 46.8   PLATELETCT 339   MPV 9.9     Recent Labs     11/28/21  0513   SODIUM 136   POTASSIUM 4.3   CHLORIDE 104   CO2 23   GLUCOSE 108*   BUN 15   CREATININE 0.42*   CALCIUM 9.1                   Imaging  CT-HEAD W/O   Final Result         1.  No acute intracranial abnormality is identified, nonspecific changes, most commonly associated with small vessel ischemic  disease. Associated moderate cerebral atrophy.   2.  Atherosclerosis.         DX-SKULL-LIMITED 3-   Final Result      Postsurgical change in the cervical spine. Otherwise no evidence of metallic foreign body.      DX-PELVIS-1 OR 2 VIEWS   Final Result      No radiopaque foreign body identified.      DX-CHEST-LIMITED (1 VIEW)   Final Result         Enlarged cardiac silhouette without evidence of acute disease.      No pacemaker.      [id number] VIEW   Final Result      No radiopaque foreign body identified.      IR-US GUIDED PIV   Final Result    Ultrasound-guided PERIPHERAL IV INSERTION performed by    qualified nursing staff as above.      EC-ECHOCARDIOGRAM COMPLETE W/O CONT   Final Result      DX-CHEST-PORTABLE (1 VIEW)   Final Result      Limited exam showing no acute cardiopulmonary disease.      CT-HEAD W/O   Final Result      1.  Extensive atrophy and white matter changes.   2.  No acute intracranial hemorrhage or territorial infarct.   3.  Small chronic LEFT frontal infarct.           Assessment/Plan  Atrial fibrillation with RVR (HCC)  Assessment & Plan  Now heart rate controlled  Continue metoprolol    CHADSVAS 7, stroke risk of 15.7% a year  HASBLED score of 3, risk of bleed is 5% a year.   Spoke to DPOA, spoke about risks of anticuagulation of bleeding as pt has hx of falls. vs thrombosis. W/ pt going to SNF, will have assistence w/ fall risk.   Continue low-dose Eliquis    Debility- (present on admission)  Assessment & Plan  Hx of dementia, long term alcohol use.     Per neighbor worsened mental function in last year.   Pending memory care placement    Encephalopathy  Assessment & Plan  Hx of Dementia and per PCP note, longterm alcohol use starting at age 8, possible wernicke worsening dementia. Pt found on ground, No signs of fall/trauma.  Dehydrated on arrival  CT head- no hematoma, signs of vascular dementia  CIWA discontinued  B12, TSH normal   Negative for infection  Daily standing weight   I/O  daily     Resolved, at baseline of oriented to self    Hypovolemia dehydration  Assessment & Plan  In ED, pt AMS, elevated BUN, Hypernatremc(149-150), Dry  given 1L ns IVF resuscitation in ED , not eating, volume down.   No renal dysfunction,  Status post IV fluids  Resolved    Alcohol withdrawal (HCC)  Assessment & Plan    Longstanding hx of alcohol use. Per pcp note pt states drinking since age of 8.   CIWA discontinued  alcohol level negative.  - Continue multivitamin, folate, and high dose IV thiamine supplementation.       Essential (primary) hypertension  Assessment & Plan  Hx of HTN on Amlodipine 10mg   BP has been within range, held medications for now.   Overnight 11/14 pt hypertensive, added PRN labetalol.     Type 2 diabetes mellitus without complication, without long-term current use of insulin (HCC)  Assessment & Plan  Hx of DM2, per PCP note not medicated.   Monitor off ISS       VTE prophylaxis: therapeutic anticoagulation with Apixaban    I have performed a physical exam and reviewed and updated ROS and Plan today (11/29/2021). In review of yesterday's note (11/28/2021), there are no changes except as documented above.

## 2021-11-29 NOTE — CARE PLAN
The patient is Stable - Low risk of patient condition declining or worsening    Shift Goals  Clinical Goals: Patient will remain free from falls  Patient Goals: sleep  Family Goals: n/a    Progress made toward(s) clinical / shift goals:    All high fall precautions in place, telesitter in place for safety  Problem: Skin Integrity  Goal: Skin integrity is maintained or improved  Outcome: Progressing

## 2021-11-29 NOTE — PROGRESS NOTES
COVID-19 surge in effect     Received report from GABRIELA Fong. Pt is A&O x1, disoriented to place, time, and situation. Pt on RA, no signs of acute distress. Pt complains of 0/10.  Telesitter in place. POC discussed with patient. Safety precautions in place, bed in lowest position, and call light and personal belongings within reach. Hourly rounding in place.

## 2021-11-29 NOTE — THERAPY
Speech Language Pathology  Daily Treatment     Patient Name: Viki García  Age:  86 y.o., Sex:  female  Medical Record #: [id number]  Today's Date: 11/29/2021     Precautions  Precautions: Fall Risk,Swallow Precautions (See Comments)  Comments: confused    Assessment  Patient seen this date for dysphagia tx session. Patient currently on EC7/TN0 diet and per RN, patient appears to be tolerating diet without difficulty. Patient confused, but cooperative and agreeable to tx objectives. Patient consumed PO trials of soft solids, mixed consistencies, dry solids, and thin liquids via cup sip and straw. Patient consumed all PO trials with no overt s/sx of aspiration. Patient required 1:1 feeding with all PO trials, but mastication appeared adequate and oral containment was sufficient. Vocal quality remained clear. Laryngeal elevation presumed as weak. Initiation of swallow trigger was timely.     Recommend patient upgrade to regular diet w/ thin liquids. Please continue 1:1 feeding. Ok for meds whole w/ thin liquid wash as tolerated. Ok for straw sips as tolerated. SLP is following 1-2x for continued diet tolerance.     Plan  Continue current treatment plan.    Discharge Recommendations: Anticipate that the patient will have no further speech therapy needs after discharge from the hospital     Objective     11/29/21 3219   Precautions   Precautions Fall Risk;Swallow Precautions ( See Comments)   Vitals   O2 Delivery Device None - Room Air   Dysphagia    Positioning / Behavior Modification Modulate Rate or Bite Size   Other Treatments PO trials of soft solids, mixed consistencies, dry solids, and thins via straw    Diet / Liquid Recommendation Regular (7);Thin (0)   Nutritional Liquid Intake Rating Scale Non thickened beverages   Nutritional Food Intake Rating Scale Total oral diet with no restrictions   Nursing Communication Swallow Precaution Sign Posted at Head of Bed   Skilled Intervention Verbal Cueing;Compensatory  Strategies   Recommended Route of Medication Administration   Medication Administration  Whole with Liquid Wash   Short Term Goals   Short Term Goal # 2 B  11/26 Patient will consume a EC7/TN0 diet with no overt s/sx of aspiration given assistance with feeding as needed.    Goal Outcome  # 2 B Goal met   Short Term Goal # 4 NEW 11/29: Patient will consume regular diet w/ thin liquids with no overt s/sx of aspiration.    Anticipated Discharge Needs   Discharge Recommendations Anticipate that the patient will have no further speech therapy needs after discharge from the hospital

## 2021-11-30 ENCOUNTER — PATIENT OUTREACH (OUTPATIENT)
Dept: HEALTH INFORMATION MANAGEMENT | Facility: OTHER | Age: 86
End: 2021-11-30

## 2021-11-30 ENCOUNTER — PATIENT MESSAGE (OUTPATIENT)
Dept: HEALTH INFORMATION MANAGEMENT | Facility: OTHER | Age: 86
End: 2021-11-30

## 2021-11-30 VITALS
RESPIRATION RATE: 19 BRPM | TEMPERATURE: 97.5 F | OXYGEN SATURATION: 94 % | HEIGHT: 63 IN | BODY MASS INDEX: 20.31 KG/M2 | SYSTOLIC BLOOD PRESSURE: 147 MMHG | DIASTOLIC BLOOD PRESSURE: 68 MMHG | HEART RATE: 80 BPM | WEIGHT: 114.64 LBS

## 2021-11-30 PROCEDURE — 99239 HOSP IP/OBS DSCHRG MGMT >30: CPT | Performed by: GENERAL PRACTICE

## 2021-11-30 PROCEDURE — A9270 NON-COVERED ITEM OR SERVICE: HCPCS

## 2021-11-30 PROCEDURE — A9270 NON-COVERED ITEM OR SERVICE: HCPCS | Performed by: STUDENT IN AN ORGANIZED HEALTH CARE EDUCATION/TRAINING PROGRAM

## 2021-11-30 PROCEDURE — 700102 HCHG RX REV CODE 250 W/ 637 OVERRIDE(OP)

## 2021-11-30 PROCEDURE — 700102 HCHG RX REV CODE 250 W/ 637 OVERRIDE(OP): Performed by: STUDENT IN AN ORGANIZED HEALTH CARE EDUCATION/TRAINING PROGRAM

## 2021-11-30 RX ORDER — ERGOCALCIFEROL 1.25 MG/1
50000 CAPSULE ORAL
Qty: 8 CAPSULE | Refills: 0 | Status: SHIPPED | OUTPATIENT
Start: 2021-12-04 | End: 2021-12-12

## 2021-11-30 RX ORDER — METOPROLOL TARTRATE 50 MG/1
50 TABLET, FILM COATED ORAL 2 TIMES DAILY
Qty: 60 TABLET | Refills: 0 | Status: SHIPPED | OUTPATIENT
Start: 2021-11-30 | End: 2021-12-30

## 2021-11-30 RX ORDER — HALOPERIDOL 2 MG/1
2 TABLET ORAL EVERY 6 HOURS PRN
Qty: 12 TABLET | Refills: 0 | Status: SHIPPED | OUTPATIENT
Start: 2021-11-30 | End: 2021-12-03

## 2021-11-30 RX ORDER — LANOLIN ALCOHOL/MO/W.PET/CERES
100 CREAM (GRAM) TOPICAL DAILY
Qty: 30 TABLET | Refills: 0 | Status: SHIPPED | OUTPATIENT
Start: 2021-12-01 | End: 2021-12-31

## 2021-11-30 RX ORDER — CHOLECALCIFEROL (VITAMIN D3) 125 MCG
5 CAPSULE ORAL EVERY EVENING
Qty: 30 TABLET | Refills: 0 | Status: SHIPPED | OUTPATIENT
Start: 2021-11-30 | End: 2021-12-30

## 2021-11-30 RX ORDER — M-VIT,TX,IRON,MINS/CALC/FOLIC 27MG-0.4MG
1 TABLET ORAL DAILY
Qty: 30 TABLET | Refills: 0 | Status: SHIPPED | OUTPATIENT
Start: 2021-12-01 | End: 2021-12-31

## 2021-11-30 RX ORDER — QUETIAPINE FUMARATE 25 MG/1
25 TABLET, FILM COATED ORAL
Qty: 30 TABLET | Refills: 0 | Status: SHIPPED | OUTPATIENT
Start: 2021-11-30 | End: 2021-12-30

## 2021-11-30 RX ADMIN — Medication 100 MG: at 04:49

## 2021-11-30 RX ADMIN — MULTIPLE VITAMINS W/ MINERALS TAB 1 TABLET: TAB at 04:49

## 2021-11-30 RX ADMIN — METOPROLOL TARTRATE 50 MG: 50 TABLET, FILM COATED ORAL at 04:50

## 2021-11-30 RX ADMIN — APIXABAN 2.5 MG: 5 TABLET, FILM COATED ORAL at 04:49

## 2021-11-30 NOTE — DISCHARGE PLANNING
Anticipated Discharge Disposition: [facility] via [facility]     Action: Rec’d vm from  Lizzy, admissions director at [facility] [phone])-she states she reviewed the corrected paperwork and they are able to accept pt today.     Pt needs [facility] transport arranged. Transport form andPCS form faxed to Whit Locke at [facility]. Requested 5:00PM .     DC Packet to be placed at pt’s chart. Cobra on dc packet. Face sheet. DNR RX for [facility]. RXS in packet.      Jasmin HAQ made aware.     Barriers to Discharge:      Plan: dc to [facility] via [facility] at 5:00PM.      Addendum 16:00: notified Maegan FAULKNER via phone that pt going at 17:00 via [facility] which was confirmed by whit at ride [facility] via voalte.

## 2021-11-30 NOTE — DISCHARGE PLANNING
DC Transport Scheduled    Received request at: 0450     Transport Company Scheduled:  [employer]  Spoke with Zaina at [facility] to schedule transport.      Scheduled Date: 11/30/21  Scheduled Time: 1700    Destination: [facility]  [address] [place] [address]    Notified care team of scheduled transport via Voalte.     If there are any changes needed to the DC transportation scheduled, please contact [facility] at ext. [phone] between the hours of 4914-6067 Mon-Fri. If outside those hours, contact the ED Case Manager at ext. [phone].

## 2021-11-30 NOTE — DOCUMENTATION QUERY
[facility]                                                                       Query Response Note      PATIENT:               RENEE PLATT  ACCT #:                  [id number]  MRN:                     [id number]  :                      1935  ADMIT DATE:       2021 1:02 PM  DISCH DATE:          RESPONDING  PROVIDER #:        [id number]           QUERY TEXT:    Patient admitted due to acute encephalopathy (type unspecified). Please specify the most likely type of encephalopathy for this admission.    NOTE:  If an appropriate response is not listed below, please respond with a new note.    The patient's Clinical Indicators include:    Chronic alcohol abuse  long term alcohol use  Hypernatremia  Hypovolemia Dehydration  IVF/CIWA/Vitamins  Dementia    Thank you,  Yohana Underwood RN, CCS  Clinical Documentation Integrity  Connect via Voalte Messenger  Options provided:   -- Alcoholic encephalopathy   -- Metabolic encephalopathy   -- Wernicke?s encephalopathy   -- Unable to determine      Query created by: Yohana Underwood on 2021 10:19 AM    RESPONSE TEXT:    Alcoholic encephalopathy          Electronically signed by:  JOSE FRANCISCO RAZO MD 2021 10:23 AM

## 2021-11-30 NOTE — CARE PLAN
The patient is Stable - Low risk of patient condition declining or worsening    Shift Goals  Clinical Goals: Patient will discharge by 10am  Patient Goals: sleep  Family Goals: n/a    Progress made toward(s) clinical / shift goals:  NA  Patient is not progressing towards the following goals:  Problem: Knowledge Deficit - Standard  Goal: Patient and family/care givers will demonstrate understanding of plan of care, disease process/condition, diagnostic tests and medications  Outcome: Not Progressing   Pt still has not discharged, and the accepting facility has not answered social works calls

## 2021-11-30 NOTE — DISCHARGE PLANNING
Anticipated Discharge Disposition: [facility] via [facility]    Action: Called for Lizzy, admissions director at [facility] ([phone])-she is not in yet -await callback from her.   In addition, faxed [facility] admit paperwork and covid neg and TB neg results to them at fax [phone]. Lizzy will have to review packet for acceptance.     Rec’d RXs from Dr Barth and will include in dc packet.     Pt will need [facility] transport arranged.     Jasmin HAQ made aware.    Barriers to Discharge: await callback and acceptance to [facility]    Plan: care coordination to follow for dc planning needs.

## 2021-11-30 NOTE — DISCHARGE SUMMARY
Discharge Summary    CHIEF COMPLAINT ON ADMISSION  Chief Complaint   Patient presents with   • Altered Mental Status     pts last seen by her neighbors at her baseline at 1100 yesterday. neighbors went to check on pt and she was on the ground unable to get up. per neighbors pt is more confused, per neighbors pt has undiagnosed dementia       Reason for Admission  AMS     CODE STATUS  DNAR/DNI    HPI & HOSPITAL COURSE  This is an 86 y.o. female with PMHx of hypertension, type 2 DM with A1C of 6.1 and alcohol use disorder who was admitted on 11/12/2021 due to acute encephalopathy.  Patient was found down by her , head CT negative.  Patient noted to have hypernatremia, patient was treated with IV fluids.    Patient admits to daily alcohol use, WA protocol was in place.  Patient's admission was complicated as she developed atrial fibrillation with RVR, patient converted back to normal sinus rhythm on 11/21/2021.  Currently on metoprolol and Eliquis.    Patient also noted to have a UTI and urinary retention, she was treated with antibiotics and to discharge with a Barnes catheter in place.    Patient to be discharged to memory care.  Patient provided with 30-day supply of all her medications.    Therefore, she is discharged in good and stable condition to home with close outpatient follow-up.    The patient met 2-midnight criteria for an inpatient stay at the time of discharge.    FOLLOW UP ITEMS POST DISCHARGE  Primary care physician    DISCHARGE DIAGNOSES  Active Problems:    Chronic alcohol abuse POA: Unknown    Hypernatremia POA: Unknown    Altered mental status POA: Unknown    Debility POA: Yes    Atrial fibrillation with RVR (HCC) POA: No    UTI (urinary tract infection) POA: Unknown    Hypertensive urgency POA: Unknown    Vitamin D deficiency POA: Unknown    Hypokalemia POA: Unknown    Prediabetes POA: Unknown    Leukocytosis POA: Unknown    Lactic acidosis POA: Unknown  Resolved Problems:    * No resolved  hospital problems. *      FOLLOW UP  Chang Soares M.D.  [address]  [facility] [address]  [facility] [address]  [phone]    In 2 weeks        MEDICATIONS ON DISCHARGE     Medication List      START taking these medications      Instructions   apixaban 2.5mg Tabs  Commonly known as: ELIQUIS   Take 1 Tablet by mouth 2 times a day for 30 days. Indications: Thromboembolism secondary to Atrial Fibrillation  Dose: 2.5 mg     haloperidol 2 MG Tabs  Commonly known as: HALDOL   Take 1 Tablet by mouth every 6 hours as needed for up to 3 days.  Dose: 2 mg     melatonin 5 mg Tabs   Take 1 Tablet by mouth every evening for 30 days.  Dose: 5 mg     metoprolol tartrate 50 MG Tabs  Commonly known as: LOPRESSOR   Take 1 Tablet by mouth 2 times a day for 30 days.  Dose: 50 mg     QUEtiapine 25 MG Tabs  Commonly known as: Seroquel   Take 1 Tablet by mouth 1 time a day as needed (agitation) for up to 30 days.  Dose: 25 mg     therapeutic multivitamin-minerals Tabs  Start taking on: December 1, 2021   Take 1 Tablet by mouth every day for 30 days.  Dose: 1 Tablet     thiamine 100 MG tablet  Start taking on: December 1, 2021  Commonly known as: THIAMINE   Take 1 Tablet by mouth every day for 30 days.  Dose: 100 mg     vitamin D2 (Ergocalciferol) 1.25 MG (83760 UT) Caps capsule  Start taking on: December 4, 2021  Commonly known as: Drisdol   Take 1 Capsule by mouth every 7 days for 8 days.  Dose: 50,000 Units            Allergies  No Known Allergies    DIET  Orders Placed This Encounter   Procedures   • Diet Order Diet: Regular (Chop foods for pt when feeding); Tray Modifications (optional): SLP - 1:1 Supervision by Nursing     Standing Status:   Standing     Number of Occurrences:   1     Order Specific Question:   Diet:     Answer:   Regular [1]     Comments:   Chop foods for pt when feeding     Order Specific Question:   Tray Modifications (optional)     Answer:   SLP - 1:1 Supervision by Nursing       ACTIVITY  As tolerated.  Weight  bearing as tolerated    LINES, DRAINS, AND WOUNDS  This is an automated list. Peripheral IVs will be removed prior to discharge.     Urethral Catheter 16 Fr. (Active)   Site Assessment Clean;Skin intact 11/29/21 2200   Collection Container Standard drainage bag 11/29/21 2200   Urinary Catheter Care Drainage Tube Extended;Drainage Bag Not Overfilled;Drainage Tubing Properly Secured;Drainage Bag Below Bladder Level and Not on Floor 11/29/21 2200   Securement Method Securing device (Describe) 11/29/21 2200   Output (mL) 400 mL 11/30/21 0500      Moisture Associated Skin Damage 11/16/21 Buttock;Sacrum (Active)   Wound Image   11/28/21 1000   NEXT Weekly Photo (Inpatient Only) 12/01/21 11/28/21 1000   Drainage Amount None 11/29/21 2200   Periwound Assessment [place] 11/29/21 2200   IAD Cleansing Foam Cleanser/Washcloth 11/25/21 2000   Periwound Protectant Barrier Paste 11/28/21 1940       Wound 11/13/21 Pressure Injury Heel Left (POA) (Active)   Wound Image    11/16/21 1000   Site Assessment Clean;Intact;Dry 11/29/21 2200   Periwound Assessment Clean;Dry;Intact 11/29/21 2200   Margins Attached edges 11/25/21 2000   Closure Adhesive bandage 11/22/21 0745   Drainage Amount None 11/25/21 2000   Treatments Offloading 11/28/21 1940   Wound Cleansing Approved Wound Cleanser 11/25/21 2000   Periwound Protectant Not Applicable 11/25/21 2000   Dressing Cleansing/Solutions Not Applicable 11/25/21 2000   Dressing Options Mepilex 11/28/21 1940   Dressing Changed Changed 11/25/21 2000   Dressing Status Clean;Dry;Intact 11/28/21 1940   Dressing Change/Treatment Frequency Every 72 hrs, and As Needed 11/25/21 2000   NEXT Dressing Change/Treatment Date 11/29/21 11/25/21 2000   NEXT Weekly Photo (Inpatient Only) 11/29/21 11/25/21 2000   Pressure Injury Stage DTPI 11/16/21 1000   Non-staged Wound Description Not applicable 11/16/21 1000   Wound Length (cm) 1.5 cm 11/16/21 1000   Wound Width (cm) 1.8 cm 11/16/21 1000   Wound Surface Area  (cm^2) 2.7 cm^2 11/16/21 1000   Shape Irregular 11/16/21 1000   Wound Odor None 11/22/21 0745   Exposed Structures None 11/22/21 0745   WOUND NURSE ONLY - Time Spent with Patient (mins) 60 11/16/21 1000              Urethral Catheter 16 Fr. (Active)   Site Assessment Clean;Skin intact 11/29/21 2200   Collection Container Standard drainage bag 11/29/21 2200   Urinary Catheter Care Drainage Tube Extended;Drainage Bag Not Overfilled;Drainage Tubing Properly Secured;Drainage Bag Below Bladder Level and Not on Floor 11/29/21 2200   Securement Method Securing device (Describe) 11/29/21 2200   Output (mL) 400 mL 11/30/21 0500        MENTAL STATUS ON TRANSFER             CONSULTATIONS  None    PROCEDURES  None    LABORATORY  Lab Results   Component Value Date    SODIUM 136 11/28/2021    POTASSIUM 4.3 11/28/2021    CHLORIDE 104 11/28/2021    CO2 23 11/28/2021    GLUCOSE 108 (H) 11/28/2021    BUN 15 11/28/2021    CREATININE 0.42 (L) 11/28/2021    CREATININE 0.7 02/25/2008        Lab Results   Component Value Date    WBC 12.4 (H) 11/28/2021    HEMOGLOBIN 11.3 (L) 11/28/2021    HEMATOCRIT 33.4 (L) 11/28/2021    PLATELETCT 339 11/28/2021      CT-HEAD W/O   Final Result         1.  No acute intracranial abnormality is identified, nonspecific changes, most commonly associated with small vessel ischemic disease. Associated moderate cerebral atrophy.   2.  Atherosclerosis.         DX-SKULL-LIMITED 3-   Final Result      Postsurgical change in the cervical spine. Otherwise no evidence of metallic foreign body.      DX-PELVIS-1 OR 2 VIEWS   Final Result      No radiopaque foreign body identified.      DX-CHEST-LIMITED (1 VIEW)   Final Result         Enlarged cardiac silhouette without evidence of acute disease.      No pacemaker.      VB-KTIMEAM-5 VIEW   Final Result      No radiopaque foreign body identified.      IR-US GUIDED PIV   Final Result    Ultrasound-guided PERIPHERAL IV INSERTION performed by    qualified nursing staff as  above.      EC-ECHOCARDIOGRAM COMPLETE W/O CONT   Final Result      DX-CHEST-PORTABLE (1 VIEW)   Final Result      Limited exam showing no acute cardiopulmonary disease.      CT-HEAD W/O   Final Result      1.  Extensive atrophy and white matter changes.   2.  No acute intracranial hemorrhage or territorial infarct.   3.  Small chronic LEFT frontal infarct.        Total time of the discharge process exceeds 45 minutes.

## 2021-11-30 NOTE — CARE PLAN
The patient is Stable - Low risk of patient condition declining or worsening    Shift Goals  Clinical Goals: remain free from falls  Patient Goals: sleep  Family Goals: n/a    Progress made toward(s) clinical / shift goals: All fall risk precautions in place. Telesitter in place.     Patient is not progressing towards the following goals:

## 2021-11-30 NOTE — PROGRESS NOTES
COVID-19 surge in effect     Received report from GABRIELA Castellanos. Pt is A&O x1, disoriented to place, time, and situation. Pt on RA, no signs of acute distress. Pt complains of 0/10.  Telesitter in place. POC discussed with patient. Safety precautions in place, bed in lowest position, and call light and personal belongings within reach. Hourly rounding in place.

## 2021-11-30 NOTE — DISCHARGE PLANNING
Rec’d vm from Lizzy, admissions director at [facility] -she states she reviewed some of the paperwork we faxed and it is filled out incorrectly. Will call her back at [phone] and ascertain what needs to be corrected.      Addendum 1:57-Had MD make corrections to [facility] paperwork. Refaxed to Lizzy then left VM for Lizzy to return my call.

## 2021-12-01 NOTE — DISCHARGE INSTRUCTIONS
Discharge Instructions    Discharged to [facility] by ambulance with [facility]. Discharged via ambulance, hospital escort: Yes.  Special equipment needed: Not Applicable    Be sure to schedule a follow-up appointment with your primary care doctor or any specialists as instructed.     Discharge Plan:   Diet Plan: Discussed  Activity Level: Discussed  Confirmed Follow up Appointment: No (Comments)  Confirmed Symptoms Management: Discussed  Medication Reconciliation Updated: Yes  Influenza Vaccine Indication: Patient Refuses    I understand that a diet low in cholesterol, fat, and sodium is recommended for good health. Unless I have been given specific instructions below for another diet, I accept this instruction as my diet prescription.   Other diet: Regular    Special Instructions: None    · Is patient discharged on Warfarin / Coumadin?   No     Depression / Suicide Risk    As you are discharged from this [facility] facility, it is important to learn how to keep safe from harming yourself.    Recognize the warning signs:  · Abrupt changes in personality, positive or negative- including increase in energy   · Giving away possessions  · Change in eating patterns- significant weight changes-  positive or negative  · Change in sleeping patterns- unable to sleep or sleeping all the time   · Unwillingness or inability to communicate  · Depression  · Unusual sadness, discouragement and loneliness  · Talk of wanting to die  · Neglect of personal appearance   · Rebelliousness- reckless behavior  · Withdrawal from people/activities they love  · Confusion- inability to concentrate     If you or a loved one observes any of these behaviors or has concerns about self-harm, here's what you can do:  · Talk about it- your feelings and reasons for harming yourself  · Remove any means that you might use to hurt yourself (examples: pills, rope, extension cords, firearm)  · Get professional help from the community (Mental Health,  Substance Abuse, psychological counseling)  · Do not be alone:Call your Safe Contact- someone whom you trust who will be there for you.  · Call your local CRISIS HOTLINE 054-6167 or 504-082-1150  · Call your local Children's Mobile Crisis Response Team [place] (222) 625-4856 or www.[employer]ineer  · Call the toll free National Suicide Prevention Hotlines   · National Suicide Prevention Lifeline 468-357-FUDT (0799)  · National Hope Line Network 800-SUICIDE (901-0064)

## 2021-12-14 NOTE — DOCUMENTATION QUERY
[facility]                                                                       Query Response Note      PATIENT:               RNEEE PLATT  ACCT #:                  [id number]  MRN:                     [id number]  :                      1935  ADMIT DATE:       2021 1:02 PM  DISCH DATE:        2021 6:00 PM  RESPONDING  PROVIDER #:        [id number]           QUERY TEXT:    Pressure ulcer is documented as follows by the Wound Care RN in the Medical Record.     Pressure Injury(DTPI) Heel Left (POA) Active    Please specify if you:    NOTE:  If an appropriate response is not listed below, please respond with a new note.    Sabi [email or web address]      The patient's Clinical Indicators include:  Pressure ulcers per wound care team.  Clinical indicators:, dehydration, dementia,  bp: 129/59, pulse: 58, resp: 15, elevated cpk  Treatment/monitoring:wound care, labs, cultures and diagnostics monitored, treated with antibiotics and d/c w/lamb catheter.  Risks: DTPI, alcoholic encephelopathy, hypernatremia, UTI, dementia, dehydration, HTN, A.fib  Options provided:   -- Agree with Wound Care RN assessment   -- Disagree with Wound Care RN assessment   -- Unable to determine      Query created by: Sabi Ennis on 2021 4:09 AM    RESPONSE TEXT:    Agree with Wound Care RN assessment          Electronically signed by:  JOSE FRANCISCO RAZO MD 2021 6:45 AM

## 2023-06-16 NOTE — CARE PLAN
Problem: Knowledge Deficit - Standard  Goal: Patient and family/care givers will demonstrate understanding of plan of care, disease process/condition, diagnostic tests and medications  Outcome: Not Met  Note: Provided pt education regarding reason for hospital stay, pt confused, unable to retain education provided. Will continue to reorient     Problem: Seizure Precautions  Goal: Implementation of seizure precautions  Outcome: Met  Note: Seizure precautions in place, no seizure activity noted   The patient is Watcher - Medium risk of patient condition declining or worsening    Shift Goals  Clinical Goals: control heart rate, reorient, restraints  Patient Goals: YAMILETH  Family Goals: YAMILETH    Progress made toward(s) clinical / shift goals:  Pt becoming more alert    Patient is not progressing towards the following goals:Remains confused at this time      Problem: Knowledge Deficit - Standard  Goal: Patient and family/care givers will demonstrate understanding of plan of care, disease process/condition, diagnostic tests and medications  Outcome: Not Met  Note: Provided pt education regarding reason for hospital stay, pt confused, unable to retain education provided. Will continue to reorient      Imiquimod Counseling:  I discussed with the patient the risks of imiquimod including but not limited to erythema, scaling, itching, weeping, crusting, and pain.  Patient understands that the inflammatory response to imiquimod is variable from person to person and was educated regarded proper titration schedule.  If flu-like symptoms develop, patient knows to discontinue the medication and contact us.

## 2023-11-26 NOTE — PROGRESS NOTES
Hospital Medicine Daily Progress Note    Date of Service  11/30/2021    Chief Complaint  Viki García is a 86 y.o. female admitted 11/12/2021 with AMS    Hospital Course  This is an 86 y.o. female with PMHx of hypertension, type 2 DM with A1C of 6.1 and alcohol use disorder who was admitted on 11/12/2021 due to acute encephalopathy. Patient was found down by her , head CT negative.  Patient noted to have hypernatremia, patient was treated with IV fluids.     Patient admits to daily alcohol use, CIWA protocol was in place.  Patient's admission was complicated as she developed atrial fibrillation with RVR, patient converted back to normal sinus rhythm on 11/21/2021.  Currently on metoprolol and Eliquis.     Patient also noted to have a UTI and urinary retention, she was treated with antibiotics and to discharge with a Barnes catheter in place.    Interval Problem Update  Patient is medically clear, pending discharge to memory care.  Covid swab 11/28/2021 negative and Quantiferon gold negative.    I have personally seen and examined the patient at bedside. I discussed the plan of care with patient and bedside RN.    Consultants/Specialty  N/A    Code Status  DNAR/DNI    Disposition  Patient is medically cleared.   Anticipate discharge to Memory Care.  I have placed the appropriate orders for post-discharge needs.    Review of Systems  Review of Systems   Unable to perform ROS: Dementia        Physical Exam  Temp:  [36.1 °C (97 °F)-37.2 °C (98.9 °F)] 36.4 °C (97.6 °F)  Pulse:  [60-80] 60  Resp:  [18-20] 20  BP: (112-125)/(54-61) 125/54  SpO2:  [93 %-96 %] 96 %    Physical Exam  Vitals and nursing note reviewed.   Constitutional:       General: She is not in acute distress.     Comments: Frail appearing  Resting comfortably    HENT:      Head: Normocephalic and atraumatic.   Eyes:      Conjunctiva/sclera: Conjunctivae normal.   Cardiovascular:      Rate and Rhythm: Normal rate and regular rhythm.   Pulmonary:       Effort: Pulmonary effort is normal. No respiratory distress.   Abdominal:      General: Abdomen is flat. There is no distension.      Palpations: Abdomen is soft.      Tenderness: There is no abdominal tenderness.   Musculoskeletal:      Right lower leg: No edema.      Left lower leg: No edema.   Skin:     General: Skin is warm and dry.   Neurological:      General: No focal deficit present.      Mental Status: She is alert. She is disoriented.      Cranial Nerves: No cranial nerve deficit.      Comments: Oriented to self   Occasionally speaks in Kiswahili    Psychiatric:         Mood and Affect: Mood is depressed.         Fluids    Intake/Output Summary (Last 24 hours) at 11/30/2021 1326  Last data filed at 11/30/2021 0500  Gross per 24 hour   Intake 240 ml   Output 700 ml   Net -460 ml       Laboratory  Recent Labs     11/28/21  0513   WBC 12.4*   RBC 3.50*   HEMOGLOBIN 11.3*   HEMATOCRIT 33.4*   MCV 95.4   MCH 32.3   MCHC 33.8   RDW 46.8   PLATELETCT 339   MPV 9.9     Recent Labs     11/28/21  0513   SODIUM 136   POTASSIUM 4.3   CHLORIDE 104   CO2 23   GLUCOSE 108*   BUN 15   CREATININE 0.42*   CALCIUM 9.1                   Imaging  CT-HEAD W/O   Final Result         1.  No acute intracranial abnormality is identified, nonspecific changes, most commonly associated with small vessel ischemic disease. Associated moderate cerebral atrophy.   2.  Atherosclerosis.         DX-SKULL-LIMITED 3-   Final Result      Postsurgical change in the cervical spine. Otherwise no evidence of metallic foreign body.      DX-PELVIS-1 OR 2 VIEWS   Final Result      No radiopaque foreign body identified.      DX-CHEST-LIMITED (1 VIEW)   Final Result         Enlarged cardiac silhouette without evidence of acute disease.      No pacemaker.      ZV-BUTBNST-0 VIEW   Final Result      No radiopaque foreign body identified.      IR-US GUIDED PIV   Final Result    Ultrasound-guided PERIPHERAL IV INSERTION performed by    qualified nursing  staff as above.      EC-ECHOCARDIOGRAM COMPLETE W/O CONT   Final Result      DX-CHEST-PORTABLE (1 VIEW)   Final Result      Limited exam showing no acute cardiopulmonary disease.      CT-HEAD W/O   Final Result      1.  Extensive atrophy and white matter changes.   2.  No acute intracranial hemorrhage or territorial infarct.   3.  Small chronic LEFT frontal infarct.           Assessment/Plan  Atrial fibrillation with RVR (HCC)  Assessment & Plan  Now heart rate controlled  Continue metoprolol    CHADSVAS 7, stroke risk of 15.7% a year  HASBLED score of 3, risk of bleed is 5% a year.   Spoke to DPOA, spoke about risks of anticuagulation of bleeding as pt has hx of falls. vs thrombosis. W/ pt going to SNF, will have assistence w/ fall risk.   Continue low-dose Eliquis    Debility- (present on admission)  Assessment & Plan  Hx of dementia, long term alcohol use.     Per neighbor worsened mental function in last year.   Pending memory care placement    Encephalopathy  Assessment & Plan  Hx of Dementia and per PCP note, longterm alcohol use starting at age 8, possible wernicke worsening dementia. Pt found on ground, No signs of fall/trauma.  Dehydrated on arrival  CT head- no hematoma, signs of vascular dementia  CIWA discontinued  B12, TSH normal   Negative for infection  Daily standing weight   I/O daily     Resolved, at baseline of oriented to self    Hypovolemia dehydration  Assessment & Plan  In ED, pt AMS, elevated BUN, Hypernatremc(149-150), Dry  given 1L ns IVF resuscitation in ED , not eating, volume down.   No renal dysfunction,  Status post IV fluids  Resolved    Alcohol withdrawal (HCC)  Assessment & Plan    Longstanding hx of alcohol use. Per pcp note pt states drinking since age of 8.   CIWA discontinued  alcohol level negative.  - Continue multivitamin, folate, and high dose IV thiamine supplementation.       Essential (primary) hypertension  Assessment & Plan  Hx of HTN on Amlodipine 10mg   BP has been  within range, held medications for now.   Overnight 11/14 pt hypertensive, added PRN labetalol.     Type 2 diabetes mellitus without complication, without long-term current use of insulin (HCC)  Assessment & Plan  Hx of DM2, per PCP note not medicated.   Monitor off ISS       VTE prophylaxis: SCDs/TEDs and therapeutic anticoagulation with Eliquis    I have performed a physical exam and reviewed and updated ROS and Plan today (11/30/2021). In review of yesterday's note (11/29/2021), there are no changes except as documented above.       no